# Patient Record
Sex: MALE | Race: BLACK OR AFRICAN AMERICAN | NOT HISPANIC OR LATINO | Employment: UNEMPLOYED | URBAN - METROPOLITAN AREA
[De-identification: names, ages, dates, MRNs, and addresses within clinical notes are randomized per-mention and may not be internally consistent; named-entity substitution may affect disease eponyms.]

---

## 2019-05-30 PROBLEM — S23.9XXA THORACIC BACK SPRAIN: Status: ACTIVE | Noted: 2019-05-30

## 2019-05-30 PROBLEM — S59.901A ELBOW INJURY, RIGHT, INITIAL ENCOUNTER: Status: ACTIVE | Noted: 2019-05-30

## 2019-05-30 PROBLEM — S33.5XXA LUMBAR SPRAIN: Status: ACTIVE | Noted: 2019-05-30

## 2019-05-30 PROBLEM — S13.9XXA CERVICAL SPRAIN, INITIAL ENCOUNTER: Status: ACTIVE | Noted: 2019-05-30

## 2019-10-07 PROBLEM — J01.00 ACUTE NON-RECURRENT MAXILLARY SINUSITIS: Status: ACTIVE | Noted: 2019-10-07

## 2019-10-07 PROBLEM — F41.1 GAD (GENERALIZED ANXIETY DISORDER): Status: ACTIVE | Noted: 2019-10-07

## 2019-10-07 PROBLEM — Z00.00 ANNUAL PHYSICAL EXAM: Status: ACTIVE | Noted: 2019-10-07

## 2019-10-07 PROBLEM — S13.9XXA CERVICAL SPRAIN, INITIAL ENCOUNTER: Status: RESOLVED | Noted: 2019-05-30 | Resolved: 2019-10-07

## 2019-10-07 PROBLEM — S33.5XXA LUMBAR SPRAIN: Status: RESOLVED | Noted: 2019-05-30 | Resolved: 2019-10-07

## 2019-10-07 PROBLEM — S23.9XXA THORACIC BACK SPRAIN: Status: RESOLVED | Noted: 2019-05-30 | Resolved: 2019-10-07

## 2019-10-16 PROBLEM — E78.2 MIXED HYPERLIPIDEMIA: Status: ACTIVE | Noted: 2019-10-16

## 2019-10-16 PROBLEM — Z00.00 ANNUAL PHYSICAL EXAM: Status: RESOLVED | Noted: 2019-10-07 | Resolved: 2019-10-16

## 2019-10-16 PROBLEM — S59.901A ELBOW INJURY, RIGHT, INITIAL ENCOUNTER: Status: RESOLVED | Noted: 2019-05-30 | Resolved: 2019-10-16

## 2019-10-16 PROBLEM — J01.00 ACUTE NON-RECURRENT MAXILLARY SINUSITIS: Status: RESOLVED | Noted: 2019-10-07 | Resolved: 2019-10-16

## 2019-11-04 PROBLEM — G89.21 CHRONIC PAIN DUE TO TRAUMA: Status: ACTIVE | Noted: 2019-11-04

## 2020-04-17 PROBLEM — R41.840 LACK OF CONCENTRATION: Status: ACTIVE | Noted: 2020-04-17

## 2021-04-15 ENCOUNTER — PATIENT MESSAGE (OUTPATIENT)
Dept: RESEARCH | Facility: HOSPITAL | Age: 31
End: 2021-04-15

## 2021-06-25 ENCOUNTER — IMMUNIZATION (OUTPATIENT)
Dept: PRIMARY CARE CLINIC | Facility: CLINIC | Age: 31
End: 2021-06-25

## 2021-06-25 DIAGNOSIS — Z23 NEED FOR VACCINATION: Primary | ICD-10-CM

## 2021-06-25 PROCEDURE — 91303 COVID-19,VECTOR-NR,RS-AD26,PF,0.5 ML DOSE VACCINE (JANSSEN): ICD-10-PCS | Mod: S$GLB,,, | Performed by: INTERNAL MEDICINE

## 2021-06-25 PROCEDURE — 91303 COVID-19,VECTOR-NR,RS-AD26,PF,0.5 ML DOSE VACCINE (JANSSEN): CPT | Mod: S$GLB,,, | Performed by: INTERNAL MEDICINE

## 2021-06-25 PROCEDURE — 0031A COVID-19,VECTOR-NR,RS-AD26,PF,0.5 ML DOSE VACCINE (JANSSEN): ICD-10-PCS | Mod: CV19,S$GLB,, | Performed by: INTERNAL MEDICINE

## 2021-06-25 PROCEDURE — 0031A COVID-19,VECTOR-NR,RS-AD26,PF,0.5 ML DOSE VACCINE (JANSSEN): CPT | Mod: CV19,S$GLB,, | Performed by: INTERNAL MEDICINE

## 2022-12-06 DIAGNOSIS — S89.92XA: Primary | ICD-10-CM

## 2022-12-07 ENCOUNTER — TELEPHONE (OUTPATIENT)
Dept: ORTHOPEDICS | Facility: CLINIC | Age: 32
End: 2022-12-07

## 2022-12-07 NOTE — TELEPHONE ENCOUNTER
Called patient and LVM stating that we would be happy to assist in scheduling an appointment. Left call back number.

## 2022-12-12 ENCOUNTER — PATIENT MESSAGE (OUTPATIENT)
Dept: ORTHOPEDICS | Facility: CLINIC | Age: 32
End: 2022-12-12
Payer: COMMERCIAL

## 2022-12-12 ENCOUNTER — CLINICAL SUPPORT (OUTPATIENT)
Dept: REHABILITATION | Facility: HOSPITAL | Age: 32
End: 2022-12-12
Attending: ORTHOPAEDIC SURGERY
Payer: COMMERCIAL

## 2022-12-12 DIAGNOSIS — M25.662 DECREASED RANGE OF MOTION (ROM) OF LEFT KNEE: ICD-10-CM

## 2022-12-12 DIAGNOSIS — M25.562 LEFT KNEE PAIN, UNSPECIFIED CHRONICITY: Primary | ICD-10-CM

## 2022-12-12 DIAGNOSIS — Z74.09 DECREASED FUNCTIONAL MOBILITY AND ENDURANCE: ICD-10-CM

## 2022-12-12 DIAGNOSIS — R26.9 GAIT DIFFICULTY: ICD-10-CM

## 2022-12-12 PROCEDURE — 97162 PT EVAL MOD COMPLEX 30 MIN: CPT | Mod: PN

## 2022-12-12 PROCEDURE — 97110 THERAPEUTIC EXERCISES: CPT | Mod: PN
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
## 2022-12-15 ENCOUNTER — CLINICAL SUPPORT (OUTPATIENT)
Dept: REHABILITATION | Facility: HOSPITAL | Age: 32
End: 2022-12-15
Payer: COMMERCIAL

## 2022-12-15 DIAGNOSIS — M25.662 DECREASED RANGE OF MOTION (ROM) OF LEFT KNEE: Primary | ICD-10-CM

## 2022-12-15 DIAGNOSIS — R26.9 GAIT DIFFICULTY: ICD-10-CM

## 2022-12-15 DIAGNOSIS — Z74.09 DECREASED FUNCTIONAL MOBILITY AND ENDURANCE: ICD-10-CM

## 2022-12-15 PROCEDURE — 97116 GAIT TRAINING THERAPY: CPT | Mod: PN

## 2022-12-15 PROCEDURE — 97112 NEUROMUSCULAR REEDUCATION: CPT | Mod: PN

## 2022-12-15 PROCEDURE — 97110 THERAPEUTIC EXERCISES: CPT | Mod: PN

## 2022-12-15 NOTE — PROGRESS NOTES
[facility]   Physical Therapy Treatment Note     Name: Mike Aburto  Clinic Number: [id number]    Therapy Diagnosis:   Encounter Diagnoses   Name Primary?    Decreased range of motion (ROM) of left knee Yes    Gait difficulty     Decreased functional mobility and endurance      Physician: Ilda Bonilla MD    Visit Date: 12/15/2022    Physician Orders: PT Eval and Treat   Medical Diagnosis from Referral: S89.92XA (ICD-10-CM) - Injury to multiple structures of left knee  Evaluation Date: 12/12/2022  Authorization Period Expiration: 12/22/22  Plan of Care Expiration: 2/20/23  Visit # / Visits authorized: 1/ 1  FOTO 1/3  Progress Note Due: 1/11/22    PTA Visit #: 0/5     Time In: 3:40  Time Out: 4:30 PM  Total Billable Time: 50 minutes    SUBJECTIVE     Pt reports: he was somewhat sore after his last visit..  He was compliant with home exercise program.  Response to previous treatment: no notable change  Functional change: no notable change    Pain: 6/10  Location: left knee      OBJECTIVE     Objective Measures updated at progress report unless specified.     Treatment     Mike received the treatments listed below:      therapeutic exercises to develop strength, endurance, ROM, flexibility, posture, and core stabilization for 10 minutes including:   - Heel Prop - 5 minutes - 3#   - L gastroc stretch with strap - 2 minutes   - L hamstring stretch seated - 2 minutes      manual therapy techniques:  for 0 minutes, including:   - none performed today    neuromuscular re-education activities to improve: Balance, Coordination, Kinesthetic, Sense, Proprioception, and Posture for 15 minutes. The following activities were included:   - Quad set with towel under knee and heel pop up - 3x10   - SLR with AA from strap to prevent lag - 3x10   - Extensive verbal cueing and tactile cueing for quadriceps activation - breaks required for pain control    therapeutic activities to improve functional  performance for 0  minutes, including:   - none performed today    gait training to improve functional mobility and safety for 15  minutes, including:   - gait training for single crutch WBAT gait      Patient Education and Home Exercises     Home Exercises Provided and Patient Education Provided     Education provided:   - importance of consistency with HEP  - role of physical therapy for this condition      Written Home Exercises Provided: Patient instructed to cont prior HEP. Exercises were reviewed and Mike was able to demonstrate them prior to the end of the session.  Mike demonstrated good  understanding of the education provided. See EMR under Patient Instructions for exercises provided during therapy sessions    ASSESSMENT     Patient with improved quad activation and full knee extension range of motion by conclusion of today's session. He will benefit from continued care.    Mike Is progressing well towards his goals.   Pt prognosis is Good.     Pt will continue to benefit from skilled outpatient physical therapy to address the deficits listed in the problem list box on initial evaluation, provide pt/family education and to maximize pt's level of independence in the home and community environment.     Pt's spiritual, cultural and educational needs considered and pt agreeable to plan of care and goals.     Anticipated barriers to physical therapy: none    Goals: Goals:  Short Term Goals: In 4 weeks:  1.Pt to be educated on HEP to prepare for surgery.  2.Patient to demo increased dorsiflexion AROM to 15 degrees or better.  3.Patient to increase L hamstring strength to 4+/5 or better.  4.Patient to have decreased pain to 5/10 or better.  5.Patient to increase L knee extension to 0 or better.  6.Patient to improve score on the FOTO by 10%.        Long Term Goals: In 10 weeks 2/20/23  1. Patient to perform daily activities including 5 single leg sit to stand from a 24 inch box on the good leg without  limitation.  2. Patient to demonstrate increased L knee flexion AROM to 120 or better.  3. Patient to demonstrate increased L hip ROM to 120 or better .  4. Patient to have decreased pain to 3/10 or better.  5. Patient to improve score on the FOTO to 46% limitation.  6. Pt to demonstrate the ability to ambulate with the least restrictive AD with Wb as Dr permits (NWB on L LE at eval)    PLAN     Continue per plan of care.    Plan of care Certification: 12/12/2022 to 2/20/23.     Outpatient Physical Therapy 3 times weekly for 10 weeks to include the following interventions: Electrical Stimulation IFC, NMES, Gait Training, Manual Therapy, Moist Heat/ Ice, Neuromuscular Re-ed, Patient Education, Self Care, Therapeutic Activities, Therapeutic Exercise, and DN.        Elliott Hartley, PT

## 2022-12-16 ENCOUNTER — CLINICAL SUPPORT (OUTPATIENT)
Dept: REHABILITATION | Facility: HOSPITAL | Age: 32
End: 2022-12-16
Payer: COMMERCIAL

## 2022-12-16 DIAGNOSIS — M25.662 DECREASED RANGE OF MOTION (ROM) OF LEFT KNEE: Primary | ICD-10-CM

## 2022-12-16 DIAGNOSIS — R26.9 GAIT DIFFICULTY: ICD-10-CM

## 2022-12-16 DIAGNOSIS — Z74.09 DECREASED FUNCTIONAL MOBILITY AND ENDURANCE: ICD-10-CM

## 2022-12-16 PROCEDURE — 97112 NEUROMUSCULAR REEDUCATION: CPT | Mod: PN

## 2022-12-16 PROCEDURE — 97110 THERAPEUTIC EXERCISES: CPT | Mod: PN

## 2022-12-16 NOTE — PROGRESS NOTES
[facility]   Physical Therapy Treatment Note     Name: Mike Aburto  Clinic Number: [id number]    Therapy Diagnosis:   Encounter Diagnoses   Name Primary?    Decreased range of motion (ROM) of left knee Yes    Gait difficulty     Decreased functional mobility and endurance        Physician: Ilda Bonilla MD    Visit Date: 12/16/2022    Physician Orders: PT Eval and Treat   Medical Diagnosis from Referral: S89.92XA (ICD-10-CM) - Injury to multiple structures of left knee  Evaluation Date: 12/12/2022  Authorization Period Expiration: 12/22/22  Plan of Care Expiration: 2/20/23  Visit # / Visits authorized: 3/10  FOTO 1/3  Progress Note Due: 1/11/22    PTA Visit #: 0/5     Time In: 7:25 AM (patient arrived late)  Time Out: 8:20 AM  Total Billable Time: 55 minutes    SUBJECTIVE     Pt reports: he had minor soreness after his physical therapy session yesterday but overall is doing well.  He was compliant with home exercise program.  Response to previous treatment: no notable change  Functional change: no notable change    Pain: 6/10  Location: left knee      OBJECTIVE     Objective Measures updated at progress report unless specified.     Treatment     Mike received the treatments listed below:      therapeutic exercises to develop strength, endurance, ROM, flexibility, posture, and core stabilization for 25 minutes including:   - Recumbent Bike - 5 minutes  - Heel Prop - 5 minutes - 3#   - L gastroc stretch with strap - 2 minutes   - L hamstring stretch seated - 2 minutes   - Heel slides with strap - 10x10s      manual therapy techniques:  for 0 minutes, including:   - none performed today    neuromuscular re-education activities to improve: Balance, Coordination, Kinesthetic, Sense, Proprioception, and Posture for 25 minutes. The following activities were included:   - Quad set no towel under knee with [place] - 10 minutes   - Prone Quad Set with [place] - 5 minutes   - SLR with [place] -  5 minutes   - Extensive verbal cueing and tactile cueing for quadriceps activation - breaks required for pain control    therapeutic activities to improve functional performance for 0  minutes, including:   - none performed today    gait training to improve functional mobility and safety for 0  minutes, including:   - none performed today      Patient Education and Home Exercises     Home Exercises Provided and Patient Education Provided     Education provided:   - importance of consistency with HEP  - role of physical therapy for this condition      Written Home Exercises Provided: Patient instructed to cont prior HEP. Exercises were reviewed and Mike was able to demonstrate them prior to the end of the session.  Mike demonstrated good  understanding of the education provided. See EMR under Patient Instructions for exercises provided during therapy sessions    ASSESSMENT     Patient with improved pain-free knee range of motion and good quad activation today. He was able to perform a SLR with no lag. He follows up with Dr. Ribera for further workup on Monday.    Mike Is progressing well towards his goals.   Pt prognosis is Good.     Pt will continue to benefit from skilled outpatient physical therapy to address the deficits listed in the problem list box on initial evaluation, provide pt/family education and to maximize pt's level of independence in the home and community environment.     Pt's spiritual, cultural and educational needs considered and pt agreeable to plan of care and goals.     Anticipated barriers to physical therapy: none    Goals: Goals:  Short Term Goals: In 4 weeks:  1.Pt to be educated on HEP to prepare for surgery.  2.Patient to demo increased dorsiflexion AROM to 15 degrees or better.  3.Patient to increase L hamstring strength to 4+/5 or better.  4.Patient to have decreased pain to 5/10 or better.  5.Patient to increase L knee extension to 0 or better.  6.Patient to improve score on  the FOTO by 10%.        Long Term Goals: In 10 weeks 2/20/23  1. Patient to perform daily activities including 5 single leg sit to stand from a 24 inch box on the good leg without limitation.  2. Patient to demonstrate increased L knee flexion AROM to 120 or better.  3. Patient to demonstrate increased L hip ROM to 120 or better .  4. Patient to have decreased pain to 3/10 or better.  5. Patient to improve score on the FOTO to 46% limitation.  6. Pt to demonstrate the ability to ambulate with the least restrictive AD with Wb as Dr permits    PLAN     Continue per plan of care.    Plan of care Certification: 12/12/2022 to 2/20/23.     Outpatient Physical Therapy 3 times weekly for 10 weeks to include the following interventions: Electrical Stimulation IFC, NMES, Gait Training, Manual Therapy, Moist Heat/ Ice, Neuromuscular Re-ed, Patient Education, Self Care, Therapeutic Activities, Therapeutic Exercise, and DN.        Elliott Hartley, PT

## 2022-12-19 ENCOUNTER — HOSPITAL ENCOUNTER (OUTPATIENT)
Dept: RADIOLOGY | Facility: HOSPITAL | Age: 32
Discharge: HOME | End: 2022-12-19
Attending: ORTHOPAEDIC SURGERY
Payer: COMMERCIAL

## 2022-12-19 ENCOUNTER — OFFICE VISIT (OUTPATIENT)
Dept: ORTHOPEDICS | Facility: CLINIC | Age: 32
End: 2022-12-19
Payer: COMMERCIAL

## 2022-12-19 VITALS — BODY MASS INDEX: 35.26 KG/M2 | WEIGHT: 224.63 LBS | HEIGHT: 67 IN

## 2022-12-19 DIAGNOSIS — S83.242A TEAR OF MEDIAL MENISCUS OF LEFT KNEE, CURRENT, UNSPECIFIED TEAR TYPE, INITIAL ENCOUNTER: ICD-10-CM

## 2022-12-19 DIAGNOSIS — S83.512A CHRONIC RUPTURE OF ACL OF LEFT KNEE: Primary | ICD-10-CM

## 2022-12-19 DIAGNOSIS — M25.562 LEFT KNEE PAIN, UNSPECIFIED CHRONICITY: ICD-10-CM

## 2022-12-19 DIAGNOSIS — S83.282A TEAR OF LATERAL MENISCUS OF LEFT KNEE, CURRENT, UNSPECIFIED TEAR TYPE, INITIAL ENCOUNTER: ICD-10-CM

## 2022-12-19 PROCEDURE — 99999 PR PBB SHADOW E&M-EST. PATIENT-LVL V: ICD-10-PCS | Mod: PBBFAC,,, | Performed by: ORTHOPAEDIC SURGERY

## 2022-12-19 PROCEDURE — 3008F BODY MASS INDEX DOCD: CPT | Mod: CPTII,S$GLB,, | Performed by: ORTHOPAEDIC SURGERY

## 2022-12-19 PROCEDURE — 1159F PR MEDICATION LIST DOCUMENTED IN MEDICAL RECORD: ICD-10-PCS | Mod: CPTII,S$GLB,, | Performed by: ORTHOPAEDIC SURGERY

## 2022-12-19 PROCEDURE — 73564 XR KNEE ORTHO LEFT WITH FLEXION: ICD-10-PCS | Mod: 26,LT,, | Performed by: RADIOLOGY

## 2022-12-19 PROCEDURE — 99204 OFFICE O/P NEW MOD 45 MIN: CPT | Mod: S$GLB,,, | Performed by: ORTHOPAEDIC SURGERY

## 2022-12-19 PROCEDURE — 73562 XR KNEE ORTHO LEFT WITH FLEXION: ICD-10-PCS | Mod: 26,RT,, | Performed by: RADIOLOGY

## 2022-12-19 PROCEDURE — 1159F MED LIST DOCD IN RCRD: CPT | Mod: CPTII,S$GLB,, | Performed by: ORTHOPAEDIC SURGERY

## 2022-12-19 PROCEDURE — 73562 X-RAY EXAM OF KNEE 3: CPT | Mod: 26,RT,, | Performed by: RADIOLOGY

## 2022-12-19 PROCEDURE — 99999 PR PBB SHADOW E&M-EST. PATIENT-LVL V: CPT | Mod: PBBFAC,,, | Performed by: ORTHOPAEDIC SURGERY

## 2022-12-19 PROCEDURE — 97110 THERAPEUTIC EXERCISES: CPT | Mod: S$GLB,,, | Performed by: ORTHOPAEDIC SURGERY

## 2022-12-19 PROCEDURE — 73564 X-RAY EXAM KNEE 4 OR MORE: CPT | Mod: 26,LT,, | Performed by: RADIOLOGY

## 2022-12-19 PROCEDURE — 3008F PR BODY MASS INDEX (BMI) DOCUMENTED: ICD-10-PCS | Mod: CPTII,S$GLB,, | Performed by: ORTHOPAEDIC SURGERY

## 2022-12-19 PROCEDURE — 73564 X-RAY EXAM KNEE 4 OR MORE: CPT | Mod: TC,LT

## 2022-12-19 PROCEDURE — 99204 PR OFFICE/OUTPT VISIT, NEW, LEVL IV, 45-59 MIN: ICD-10-PCS | Mod: S$GLB,,, | Performed by: ORTHOPAEDIC SURGERY

## 2022-12-19 PROCEDURE — 97110 PR THERAPEUTIC EXERCISES: ICD-10-PCS | Mod: S$GLB,,, | Performed by: ORTHOPAEDIC SURGERY

## 2022-12-19 RX ORDER — IBUPROFEN 800 MG/1
800 TABLET ORAL 3 TIMES DAILY
Status: ON HOLD | COMMUNITY
Start: 2022-11-27 | End: 2023-01-24 | Stop reason: HOSPADM

## 2022-12-19 RX ORDER — HYDROCODONE BITARTRATE AND ACETAMINOPHEN 5; 325 MG/1; MG/1
1 TABLET ORAL DAILY PRN
Status: ON HOLD | COMMUNITY
Start: 2022-12-16 | End: 2023-01-24 | Stop reason: HOSPADM

## 2022-12-19 RX ORDER — ERGOCALCIFEROL 1.25 MG/1
CAPSULE ORAL
COMMUNITY
Start: 2022-12-07

## 2022-12-19 RX ORDER — KETOROLAC TROMETHAMINE 10 MG/1
TABLET, FILM COATED ORAL
Status: ON HOLD | COMMUNITY
Start: 2022-11-23 | End: 2023-01-24 | Stop reason: HOSPADM

## 2022-12-19 RX ORDER — PHENTERMINE HYDROCHLORIDE 37.5 MG/1
37.5 TABLET ORAL
COMMUNITY
Start: 2022-12-07

## 2022-12-19 RX ORDER — DICLOFENAC SODIUM 10 MG/G
GEL TOPICAL
Status: ON HOLD | COMMUNITY
Start: 2022-11-23 | End: 2023-01-24 | Stop reason: HOSPADM
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
## 2022-12-19 NOTE — PATIENT INSTRUCTIONS
Assessment:  Mike Aburto is a 32 y.o. male    with a chief complaint of Injury, Pain, and Swelling of the Left Knee    DOI 11/20/22 ACL Tear, posterior root of the Medial and Lateral Menisci     Encounter Diagnoses   Name Primary?    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 Yes    Tear of medial meniscus of left knee, current, unspecified tear type, initial encounter     Tear of lateral meniscus of left knee, current, unspecified tear type, initial encounter       Plan:  Left ACL Reconstruction with quad tendon autograft, Medial and Lateral meniscus repair, possible use of allograft, any indicated procedures.  Stop taking the hydrocodone and you can continue the Toradol.  Needs PCP clearance  At least 15 minutes were spent developing, teaching, and performing a home exercise program.  A written summary was provided and all questions were answered. This service was performed by Dalia Hartley Sports Medicine Assistant under direction of Danial Ribera MD. CPT 31062-YU      Exercises listed are to be performed by the patient following surgery. Perform sets of 10 repetitions, 4 times per day.       Heel Slides        Lie flat or sit with your leg straight. Slide your heel toward your hip. Try to get your knee bent to a 90° angle. Slide your heel back so your leg is straight then relax.       Knee Extension (Lying Down)      While lying down, rest your ankle on a towel roll so that your knee and calf are not touching the floor. Allow gravity to straighten your knee. Maintain this position for up to 10 minutes.  Knee Extension (Sitting in a Chair)      While sitting in a chair, prop your heel on another chair so that there is nothing behind your calf or knee. Allow gravity to straighten your knee. Maintain this position for up to 10 minutes.      Patellar Mobilization      This exercise is done by simply pushing the patella up and down and side to side and holding that position. Movement of the patella is  essential when restoring range of motion. If the patella cannot move within the femoral groove, then the knee cannot bend and extend.       Quadriceps Isometrics (Quad Sets)        Lie flat or sit with your surgical leg straight. Tighten the muscle in the front of your thigh as much as you can, pushing the back or your knee flat against the floor. Hold this tight for 5 seconds then relax.    Straight Leg Raises (SLR)      Lie flat or sit with your leg straight and your knee brace on (if you have one). You may have your non-operative knee bent slightly for comfort. Perform a Quad set (as above) and flex your toes straight up. Lift your heel off of the floor and hold for at least 5 seconds. Keep your thigh muscle as tight as you can and lower your heel back down then relax.      Seated Knee Flexion        Sit with your legs dangling over the bed. Relax your leg allowing gravity to bend your knee. You may use your non-operative leg to gently push your operative leg into more of a bend. Maintain this position for up to 10 minutes.      Calf Pumps         Point and flex your toes to tighten your calf muscles.        Follow-up: Surgery or sooner if there are any problems between now and then.    Leave Review:   Google: Leave Google Review  Healthgrades: Leave Healthgrades Review    After Hours Number: [phone]
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
## 2022-12-20 ENCOUNTER — TELEPHONE (OUTPATIENT)
Dept: ORTHOPEDICS | Facility: CLINIC | Age: 32
End: 2022-12-20
Payer: COMMERCIAL

## 2022-12-20 NOTE — TELEPHONE ENCOUNTER
[facility] pt of 1/24/23 Sx date and that pre-admit will reach out w/ arrival time and details the afternoon of the 23rd. Pt stated this will be WC. I gave him Mariam' contact and the clinic's fax #.

## 2022-12-30 ENCOUNTER — TELEPHONE (OUTPATIENT)
Dept: ORTHOPEDICS | Facility: CLINIC | Age: 32
End: 2022-12-30
Payer: COMMERCIAL

## 2022-12-30 PROBLEM — F17.200 TOBACCO DEPENDENCE: Status: ACTIVE | Noted: 2022-12-30

## 2022-12-30 NOTE — TELEPHONE ENCOUNTER
The lady on the other line stated that this was an after hours IM emergency line. She stated that she has no knowledge of the request. Home health has not been requested from our office.     ----- Message from Fatmata Mosley sent at 12/30/2022 11:40 AM CST -----  Contact: Denny([facility])/ [phone]  Denny is calling in regards to pt PREOP paperwork. Please give her a call back at [phone]. Thank you s/g

## 2023-01-19 ENCOUNTER — PATIENT MESSAGE (OUTPATIENT)
Dept: ORTHOPEDICS | Facility: CLINIC | Age: 33
End: 2023-01-19
Payer: COMMERCIAL

## 2023-01-19 DIAGNOSIS — S83.242A TEAR OF MEDIAL MENISCUS OF LEFT KNEE, CURRENT, UNSPECIFIED TEAR TYPE, INITIAL ENCOUNTER: ICD-10-CM

## 2023-01-19 DIAGNOSIS — S83.512A CHRONIC RUPTURE OF ACL OF LEFT KNEE: Primary | ICD-10-CM

## 2023-01-19 DIAGNOSIS — S83.282A TEAR OF LATERAL MENISCUS OF LEFT KNEE, CURRENT, UNSPECIFIED TEAR TYPE, INITIAL ENCOUNTER: ICD-10-CM

## 2023-01-21 ENCOUNTER — ANESTHESIA EVENT (OUTPATIENT)
Dept: SURGERY | Facility: HOSPITAL | Age: 33
End: 2023-01-21
Payer: COMMERCIAL

## 2023-01-21 NOTE — ANESTHESIA PREPROCEDURE EVALUATION
01/21/2023  Mike Aburto is a 32 y.o., male.  Past Medical History:   Diagnosis Date    Allergy     Seasonal allergies        Past Surgical History:   Procedure Laterality Date    FRACTURE SURGERY      left lower arm    HAND SURGERY Right 2011    TONSILLECTOMY         Pre-op Assessment    I have reviewed the Patient Summary Reports.     I have reviewed the Nursing Notes. I have reviewed the NPO Status.   I have reviewed the Medications.     Review of Systems  Anesthesia Hx:  No problems with previous Anesthesia  History of prior surgery of interest to airway management or planning: Previous anesthesia: General Airway issues documented on chart review include laryngeal mask airway used  Denies Family Hx of Anesthesia complications.   Denies Personal Hx of Anesthesia complications.   Social:  Smoker, Social Alcohol Use 1 ppd, vaping now.   Hematology/Oncology:  Hematology Normal        Cardiovascular:   hyperlipidemia    Pulmonary:  Pulmonary Normal    Renal/:  Renal/ Normal     Hepatic/GI:  Hepatic/GI Normal    Neurological:  Neurology Normal    Endocrine:  Obesity / BMI > 30  Psych:   anxiety          Physical Exam  General: Alert and Oriented    Airway:  Mallampati: I   Mouth Opening: Normal  TM Distance: Normal  Tongue: Normal  Neck ROM: Normal ROM    Dental:  Intact    Chest/Lungs:  Clear to auscultation, Normal Respiratory Rate    Heart:  Rate: Normal  Rhythm: Regular Rhythm        Anesthesia Plan  Type of Anesthesia, risks & benefits discussed:    Anesthesia Type: Gen Supraglottic Airway  Intra-op Monitoring Plan: Standard ASA Monitors  Post Op Pain Control Plan: multimodal analgesia, IV/PO Opioids PRN and peripheral nerve block  Induction:  IV  Informed Consent: Informed consent signed with the Patient and all parties understand the risks and agree with anesthesia plan.  All questions  answered.   ASA Score: 2  Day of Surgery Review of History & Physical: H&P Update referred to the surgeon/provider.    Ready For Surgery From Anesthesia Perspective.     .

## 2023-01-23 ENCOUNTER — TELEPHONE (OUTPATIENT)
Dept: PREADMISSION TESTING | Facility: HOSPITAL | Age: 33
End: 2023-01-23
Payer: COMMERCIAL

## 2023-01-23 NOTE — TELEPHONE ENCOUNTER
Called and spoke with the patient about the following:     Your Surgery arrival time is at 6:30AM on 1/24/23 at [facility] location.   The address is [address] [place]  [address].      Only one adult (over 18) is to accompany you to surgery, unless it is a Pediatric patient, then 2 adults are encouraged to accompany them to the surgery center.     Your ride MUST STAY the entire time until you are discharged.      Please come to the main lobby and be prepared to show your photo ID and insurance card.      Nothing to eat or drink after midnight, unless you were instructed to take specific medications discussed with the Pre-admit Nurse.      Please call [phone] or [phone] with any questions or concerns.      Thanks.

## 2023-01-24 ENCOUNTER — ANESTHESIA (OUTPATIENT)
Dept: SURGERY | Facility: HOSPITAL | Age: 33
End: 2023-01-24
Payer: COMMERCIAL

## 2023-01-24 ENCOUNTER — HOSPITAL ENCOUNTER (OUTPATIENT)
Dept: RADIOLOGY | Facility: HOSPITAL | Age: 33
Discharge: HOME | End: 2023-01-24
Attending: ORTHOPAEDIC SURGERY | Admitting: ORTHOPAEDIC SURGERY
Payer: COMMERCIAL

## 2023-01-24 ENCOUNTER — NURSE TRIAGE (OUTPATIENT)
Dept: ADMINISTRATIVE | Facility: CLINIC | Age: 33
End: 2023-01-24
Payer: COMMERCIAL

## 2023-01-24 ENCOUNTER — TELEPHONE (OUTPATIENT)
Dept: ORTHOPEDICS | Facility: HOSPITAL | Age: 33
End: 2023-01-24
Payer: COMMERCIAL

## 2023-01-24 ENCOUNTER — HOSPITAL ENCOUNTER (OUTPATIENT)
Facility: HOSPITAL | Age: 33
Discharge: HOME | End: 2023-01-24
Attending: ORTHOPAEDIC SURGERY | Admitting: ORTHOPAEDIC SURGERY
Payer: COMMERCIAL

## 2023-01-24 VITALS
BODY MASS INDEX: 37.37 KG/M2 | HEIGHT: 66 IN | HEART RATE: 71 BPM | DIASTOLIC BLOOD PRESSURE: 73 MMHG | OXYGEN SATURATION: 100 % | RESPIRATION RATE: 15 BRPM | SYSTOLIC BLOOD PRESSURE: 124 MMHG | WEIGHT: 232.5 LBS | TEMPERATURE: 98 F

## 2023-01-24 DIAGNOSIS — S83.512D RUPTURE OF ANTERIOR CRUCIATE LIGAMENT OF LEFT KNEE, SUBSEQUENT ENCOUNTER: Primary | ICD-10-CM

## 2023-01-24 DIAGNOSIS — S83.512A LEFT ACL TEAR: ICD-10-CM

## 2023-01-24 PROCEDURE — 63600175 PHARM REV CODE 636 W HCPCS: Performed by: ANESTHESIOLOGY

## 2023-01-24 PROCEDURE — C1713 ANCHOR/SCREW BN/BN,TIS/BN: HCPCS | Performed by: ORTHOPAEDIC SURGERY

## 2023-01-24 PROCEDURE — 71000015 HC POSTOP RECOV 1ST HR: Performed by: ORTHOPAEDIC SURGERY

## 2023-01-24 PROCEDURE — 37000009 HC ANESTHESIA EA ADD 15 MINS: Performed by: ORTHOPAEDIC SURGERY

## 2023-01-24 PROCEDURE — D9220A PRA ANESTHESIA: ICD-10-PCS | Mod: CRNA,,, | Performed by: NURSE ANESTHETIST, CERTIFIED REGISTERED

## 2023-01-24 PROCEDURE — 27201423 OPTIME MED/SURG SUP & DEVICES STERILE SUPPLY: Performed by: ORTHOPAEDIC SURGERY

## 2023-01-24 PROCEDURE — 29883 ARTHRS KNE SRG MNISC RPR M&L: CPT | Mod: 51,LT,, | Performed by: ORTHOPAEDIC SURGERY

## 2023-01-24 PROCEDURE — 25000003 PHARM REV CODE 250: Performed by: NURSE ANESTHETIST, CERTIFIED REGISTERED

## 2023-01-24 PROCEDURE — 37000008 HC ANESTHESIA 1ST 15 MINUTES: Performed by: ORTHOPAEDIC SURGERY

## 2023-01-24 PROCEDURE — 36000710: Performed by: ORTHOPAEDIC SURGERY

## 2023-01-24 PROCEDURE — 25000003 PHARM REV CODE 250: Performed by: ANESTHESIOLOGY

## 2023-01-24 PROCEDURE — 63600175 PHARM REV CODE 636 W HCPCS: Performed by: NURSE ANESTHETIST, CERTIFIED REGISTERED

## 2023-01-24 PROCEDURE — 64447 NJX AA&/STRD FEMORAL NRV IMG: CPT | Mod: 59,LT,, | Performed by: ANESTHESIOLOGY

## 2023-01-24 PROCEDURE — D9220A PRA ANESTHESIA: Mod: ANES,,, | Performed by: ANESTHESIOLOGY

## 2023-01-24 PROCEDURE — 73560 X-RAY EXAM OF KNEE 1 OR 2: CPT | Mod: TC,LT

## 2023-01-24 PROCEDURE — 29888 ARTHRS AID ACL RPR/AGMNTJ: CPT | Mod: LT,,, | Performed by: ORTHOPAEDIC SURGERY

## 2023-01-24 PROCEDURE — 71000033 HC RECOVERY, INTIAL HOUR: Performed by: ORTHOPAEDIC SURGERY

## 2023-01-24 PROCEDURE — 64447 PR NERVE BLOCK INJ, ANES/STEROID, FEMORAL, INCL IMAG GUIDANCE: ICD-10-PCS | Mod: 59,LT,, | Performed by: ANESTHESIOLOGY

## 2023-01-24 PROCEDURE — 29883 PR KNEE SCOPE,MED+LAT MENIS REPAIR: ICD-10-PCS | Mod: 51,LT,, | Performed by: ORTHOPAEDIC SURGERY

## 2023-01-24 PROCEDURE — 64450 NJX AA&/STRD OTHER PN/BRANCH: CPT | Performed by: ORTHOPAEDIC SURGERY

## 2023-01-24 PROCEDURE — 36000711: Performed by: ORTHOPAEDIC SURGERY

## 2023-01-24 PROCEDURE — C1889 IMPLANT/INSERT DEVICE, NOC: HCPCS | Performed by: ORTHOPAEDIC SURGERY

## 2023-01-24 PROCEDURE — 76942 ECHO GUIDE FOR BIOPSY: CPT | Performed by: ANESTHESIOLOGY

## 2023-01-24 PROCEDURE — D9220A PRA ANESTHESIA: ICD-10-PCS | Mod: ANES,,, | Performed by: ANESTHESIOLOGY

## 2023-01-24 PROCEDURE — 29888 PR KNEE SCOPE,AID ANT CRUCIATE REPAIR: ICD-10-PCS | Mod: LT,,, | Performed by: ORTHOPAEDIC SURGERY

## 2023-01-24 PROCEDURE — D9220A PRA ANESTHESIA: Mod: CRNA,,, | Performed by: NURSE ANESTHETIST, CERTIFIED REGISTERED

## 2023-01-24 PROCEDURE — 63600175 PHARM REV CODE 636 W HCPCS: Performed by: ORTHOPAEDIC SURGERY

## 2023-01-24 DEVICE — BUTTON TIGHTROPE ABS RND 20MM: Type: IMPLANTABLE DEVICE | Site: KNEE | Status: FUNCTIONAL

## 2023-01-24 DEVICE — SYS NDL DELIVERY FAST FIX 360: Type: IMPLANTABLE DEVICE | Site: KNEE | Status: FUNCTIONAL

## 2023-01-24 DEVICE — SYS SWIVELOCK ANCH 4.75X19.1MM: Type: IMPLANTABLE DEVICE | Site: KNEE | Status: FUNCTIONAL

## 2023-01-24 DEVICE — ANCHOR SUT FIBERSTITCH 2-0 CRV: Type: IMPLANTABLE DEVICE | Site: KNEE | Status: FUNCTIONAL

## 2023-01-24 DEVICE — BUTTON CLAVICLE: Type: IMPLANTABLE DEVICE | Site: KNEE | Status: FUNCTIONAL

## 2023-01-24 RX ORDER — NEOSTIGMINE METHYLSULFATE 1 MG/ML
INJECTION, SOLUTION INTRAVENOUS
Status: DISCONTINUED | OUTPATIENT
Start: 2023-01-24 | End: 2023-01-24

## 2023-01-24 RX ORDER — SUCCINYLCHOLINE CHLORIDE 20 MG/ML
INJECTION INTRAMUSCULAR; INTRAVENOUS
Status: DISCONTINUED | OUTPATIENT
Start: 2023-01-24 | End: 2023-01-24

## 2023-01-24 RX ORDER — CHLORHEXIDINE GLUCONATE ORAL RINSE 1.2 MG/ML
10 SOLUTION DENTAL
Status: DISCONTINUED | OUTPATIENT
Start: 2023-01-24 | End: 2023-01-24 | Stop reason: HOSPADM

## 2023-01-24 RX ORDER — ASPIRIN 325 MG
325 TABLET, DELAYED RELEASE (ENTERIC COATED) ORAL DAILY
Qty: 42 TABLET | Refills: 0 | Status: SHIPPED | OUTPATIENT
Start: 2023-01-24 | End: 2023-03-07

## 2023-01-24 RX ORDER — ONDANSETRON 4 MG/1
4 TABLET, FILM COATED ORAL EVERY 8 HOURS PRN
Qty: 30 TABLET | Refills: 0 | Status: SHIPPED | OUTPATIENT
Start: 2023-01-24

## 2023-01-24 RX ORDER — DIPHENHYDRAMINE HYDROCHLORIDE 50 MG/ML
25 INJECTION INTRAMUSCULAR; INTRAVENOUS EVERY 6 HOURS PRN
Status: DISCONTINUED | OUTPATIENT
Start: 2023-01-24 | End: 2023-01-24 | Stop reason: HOSPADM

## 2023-01-24 RX ORDER — FENTANYL CITRATE 50 UG/ML
INJECTION, SOLUTION INTRAMUSCULAR; INTRAVENOUS
Status: DISCONTINUED | OUTPATIENT
Start: 2023-01-24 | End: 2023-01-24

## 2023-01-24 RX ORDER — BUPIVACAINE HYDROCHLORIDE 2.5 MG/ML
INJECTION, SOLUTION EPIDURAL; INFILTRATION; INTRACAUDAL
Status: DISCONTINUED
Start: 2023-01-24 | End: 2023-01-24 | Stop reason: HOSPADM

## 2023-01-24 RX ORDER — SODIUM CHLORIDE, SODIUM LACTATE, POTASSIUM CHLORIDE, CALCIUM CHLORIDE 600; 310; 30; 20 MG/100ML; MG/100ML; MG/100ML; MG/100ML
INJECTION, SOLUTION INTRAVENOUS CONTINUOUS
Status: DISCONTINUED | OUTPATIENT
Start: 2023-01-24 | End: 2023-01-24 | Stop reason: HOSPADM

## 2023-01-24 RX ORDER — ONDANSETRON 2 MG/ML
INJECTION INTRAMUSCULAR; INTRAVENOUS
Status: DISCONTINUED | OUTPATIENT
Start: 2023-01-24 | End: 2023-01-24

## 2023-01-24 RX ORDER — PROPOFOL 10 MG/ML
VIAL (ML) INTRAVENOUS
Status: DISCONTINUED | OUTPATIENT
Start: 2023-01-24 | End: 2023-01-24

## 2023-01-24 RX ORDER — OXYCODONE AND ACETAMINOPHEN 5; 325 MG/1; MG/1
1 TABLET ORAL
Qty: 45 TABLET | Refills: 0 | Status: SHIPPED | OUTPATIENT
Start: 2023-01-24 | End: 2023-01-30

## 2023-01-24 RX ORDER — SODIUM CHLORIDE 9 MG/ML
INJECTION, SOLUTION INTRAVENOUS CONTINUOUS
Status: DISCONTINUED | OUTPATIENT
Start: 2023-01-24 | End: 2023-01-24 | Stop reason: HOSPADM

## 2023-01-24 RX ORDER — DOCUSATE SODIUM 100 MG/1
100 CAPSULE, LIQUID FILLED ORAL 2 TIMES DAILY
Qty: 30 CAPSULE | Refills: 0 | Status: SHIPPED | OUTPATIENT
Start: 2023-01-24

## 2023-01-24 RX ORDER — HYDROCODONE BITARTRATE AND ACETAMINOPHEN 5; 325 MG/1; MG/1
1 TABLET ORAL
Status: DISCONTINUED | OUTPATIENT
Start: 2023-01-24 | End: 2023-01-24 | Stop reason: HOSPADM

## 2023-01-24 RX ORDER — DEXMEDETOMIDINE HYDROCHLORIDE 100 UG/ML
INJECTION, SOLUTION INTRAVENOUS
Status: DISCONTINUED | OUTPATIENT
Start: 2023-01-24 | End: 2023-01-24

## 2023-01-24 RX ORDER — LIDOCAINE HYDROCHLORIDE 10 MG/ML
INJECTION, SOLUTION EPIDURAL; INFILTRATION; INTRACAUDAL; PERINEURAL
Status: DISCONTINUED
Start: 2023-01-24 | End: 2023-01-24 | Stop reason: HOSPADM

## 2023-01-24 RX ORDER — ROPIVACAINE HYDROCHLORIDE 5 MG/ML
INJECTION, SOLUTION EPIDURAL; INFILTRATION; PERINEURAL
Status: COMPLETED | OUTPATIENT
Start: 2023-01-24 | End: 2023-01-24

## 2023-01-24 RX ORDER — ROCURONIUM BROMIDE 10 MG/ML
INJECTION, SOLUTION INTRAVENOUS
Status: DISCONTINUED | OUTPATIENT
Start: 2023-01-24 | End: 2023-01-24

## 2023-01-24 RX ORDER — ONDANSETRON 2 MG/ML
4 INJECTION INTRAMUSCULAR; INTRAVENOUS ONCE AS NEEDED
Status: DISCONTINUED | OUTPATIENT
Start: 2023-01-24 | End: 2023-01-24 | Stop reason: HOSPADM

## 2023-01-24 RX ORDER — EPINEPHRINE 1 MG/ML
INJECTION, SOLUTION, CONCENTRATE INTRAVENOUS
Status: DISCONTINUED
Start: 2023-01-24 | End: 2023-01-24 | Stop reason: HOSPADM

## 2023-01-24 RX ORDER — MEPERIDINE HYDROCHLORIDE 25 MG/ML
12.5 INJECTION INTRAMUSCULAR; INTRAVENOUS; SUBCUTANEOUS ONCE
Status: DISCONTINUED | OUTPATIENT
Start: 2023-01-24 | End: 2023-01-24 | Stop reason: HOSPADM

## 2023-01-24 RX ORDER — LIDOCAINE HYDROCHLORIDE 20 MG/ML
INJECTION, SOLUTION EPIDURAL; INFILTRATION; INTRACAUDAL; PERINEURAL
Status: DISCONTINUED | OUTPATIENT
Start: 2023-01-24 | End: 2023-01-24

## 2023-01-24 RX ORDER — HYDROCODONE BITARTRATE AND ACETAMINOPHEN 5; 325 MG/1; MG/1
1 TABLET ORAL EVERY 4 HOURS PRN
Status: DISCONTINUED | OUTPATIENT
Start: 2023-01-24 | End: 2023-01-24 | Stop reason: HOSPADM

## 2023-01-24 RX ORDER — CHLORHEXIDINE GLUCONATE ORAL RINSE 1.2 MG/ML
10 SOLUTION DENTAL 2 TIMES DAILY
Status: DISCONTINUED | OUTPATIENT
Start: 2023-01-24 | End: 2023-01-24 | Stop reason: HOSPADM

## 2023-01-24 RX ORDER — CEFAZOLIN SODIUM 2 G/50ML
2 SOLUTION INTRAVENOUS
Status: DISCONTINUED | OUTPATIENT
Start: 2023-01-24 | End: 2023-01-24 | Stop reason: HOSPADM

## 2023-01-24 RX ORDER — OXYCODONE HYDROCHLORIDE 5 MG/1
5 TABLET ORAL EVERY 4 HOURS PRN
Qty: 10 TABLET | Refills: 0 | Status: SHIPPED | OUTPATIENT
Start: 2023-01-24 | End: 2023-01-30

## 2023-01-24 RX ORDER — MIDAZOLAM HYDROCHLORIDE 1 MG/ML
INJECTION, SOLUTION INTRAMUSCULAR; INTRAVENOUS
Status: DISCONTINUED | OUTPATIENT
Start: 2023-01-24 | End: 2023-01-24

## 2023-01-24 RX ORDER — SODIUM CHLORIDE 0.9 % (FLUSH) 0.9 %
10 SYRINGE (ML) INJECTION
Status: DISCONTINUED | OUTPATIENT
Start: 2023-01-24 | End: 2023-01-24 | Stop reason: HOSPADM

## 2023-01-24 RX ORDER — CEPHALEXIN 500 MG/1
500 CAPSULE ORAL EVERY 12 HOURS
Qty: 10 CAPSULE | Refills: 0 | Status: SHIPPED | OUTPATIENT
Start: 2023-01-24 | End: 2023-01-29

## 2023-01-24 RX ORDER — ACETAMINOPHEN 10 MG/ML
INJECTION, SOLUTION INTRAVENOUS
Status: DISCONTINUED | OUTPATIENT
Start: 2023-01-24 | End: 2023-01-24

## 2023-01-24 RX ORDER — DEXAMETHASONE SODIUM PHOSPHATE 4 MG/ML
INJECTION, SOLUTION INTRA-ARTICULAR; INTRALESIONAL; INTRAMUSCULAR; INTRAVENOUS; SOFT TISSUE
Status: DISCONTINUED | OUTPATIENT
Start: 2023-01-24 | End: 2023-01-24

## 2023-01-24 RX ORDER — EPINEPHRINE 1 MG/ML
INJECTION, SOLUTION, CONCENTRATE INTRAVENOUS
Status: DISCONTINUED | OUTPATIENT
Start: 2023-01-24 | End: 2023-01-24 | Stop reason: HOSPADM

## 2023-01-24 RX ORDER — FENTANYL CITRATE 50 UG/ML
25 INJECTION, SOLUTION INTRAMUSCULAR; INTRAVENOUS EVERY 5 MIN PRN
Status: COMPLETED | OUTPATIENT
Start: 2023-01-24 | End: 2023-01-24

## 2023-01-24 RX ADMIN — SODIUM CHLORIDE, SODIUM LACTATE, POTASSIUM CHLORIDE, AND CALCIUM CHLORIDE: 600; 310; 30; 20 INJECTION, SOLUTION INTRAVENOUS at 07:01

## 2023-01-24 RX ADMIN — FENTANYL CITRATE 25 MCG: 50 INJECTION, SOLUTION INTRAMUSCULAR; INTRAVENOUS at 01:01

## 2023-01-24 RX ADMIN — ROCURONIUM BROMIDE 10 MG: 10 INJECTION, SOLUTION INTRAVENOUS at 09:01

## 2023-01-24 RX ADMIN — PROPOFOL 200 MG: 10 INJECTION, EMULSION INTRAVENOUS at 09:01

## 2023-01-24 RX ADMIN — SODIUM CHLORIDE, SODIUM LACTATE, POTASSIUM CHLORIDE, AND CALCIUM CHLORIDE: 600; 310; 30; 20 INJECTION, SOLUTION INTRAVENOUS at 11:01

## 2023-01-24 RX ADMIN — ROPIVACAINE HYDROCHLORIDE 20 ML: 5 INJECTION, SOLUTION EPIDURAL; INFILTRATION; PERINEURAL at 07:01

## 2023-01-24 RX ADMIN — DEXTROSE 2 G: 50 INJECTION, SOLUTION INTRAVENOUS at 09:01

## 2023-01-24 RX ADMIN — ACETAMINOPHEN 1000 MG: 10 INJECTION, SOLUTION INTRAVENOUS at 10:01

## 2023-01-24 RX ADMIN — ONDANSETRON 4 MG: 2 INJECTION, SOLUTION INTRAMUSCULAR; INTRAVENOUS at 11:01

## 2023-01-24 RX ADMIN — FENTANYL CITRATE 100 MCG: 50 INJECTION, SOLUTION INTRAMUSCULAR; INTRAVENOUS at 09:01

## 2023-01-24 RX ADMIN — DEXAMETHASONE SODIUM PHOSPHATE 8 MG: 4 INJECTION, SOLUTION INTRA-ARTICULAR; INTRALESIONAL; INTRAMUSCULAR; INTRAVENOUS; SOFT TISSUE at 09:01

## 2023-01-24 RX ADMIN — LIDOCAINE HYDROCHLORIDE 100 MG: 20 INJECTION, SOLUTION EPIDURAL; INFILTRATION; INTRACAUDAL; PERINEURAL at 09:01

## 2023-01-24 RX ADMIN — DEXMEDETOMIDINE HYDROCHLORIDE 4 MCG: 100 INJECTION, SOLUTION INTRAVENOUS at 12:01

## 2023-01-24 RX ADMIN — HYDROCODONE BITARTRATE AND ACETAMINOPHEN 1 TABLET: 5; 325 TABLET ORAL at 01:01

## 2023-01-24 RX ADMIN — FENTANYL CITRATE 100 MCG: 50 INJECTION, SOLUTION INTRAMUSCULAR; INTRAVENOUS at 07:01

## 2023-01-24 RX ADMIN — DEXMEDETOMIDINE HYDROCHLORIDE 8 MCG: 100 INJECTION, SOLUTION INTRAVENOUS at 11:01

## 2023-01-24 RX ADMIN — ROCURONIUM BROMIDE 40 MG: 10 INJECTION, SOLUTION INTRAVENOUS at 09:01

## 2023-01-24 RX ADMIN — SUCCINYLCHOLINE CHLORIDE 160 MG: 20 INJECTION, SOLUTION INTRAMUSCULAR; INTRAVENOUS at 09:01

## 2023-01-24 RX ADMIN — GLYCOPYRROLATE 0.6 MG: 0.2 INJECTION, SOLUTION INTRAMUSCULAR; INTRAVITREAL at 12:01

## 2023-01-24 RX ADMIN — NEOSTIGMINE METHYLSULFATE 4 MG: 1 INJECTION INTRAVENOUS at 12:01

## 2023-01-24 RX ADMIN — MIDAZOLAM 2 MG: 1 INJECTION INTRAMUSCULAR; INTRAVENOUS at 07:01

## 2023-01-24 RX ADMIN — FENTANYL CITRATE 50 MCG: 50 INJECTION, SOLUTION INTRAMUSCULAR; INTRAVENOUS at 11:01

## 2023-01-24 RX ADMIN — FENTANYL CITRATE 50 MCG: 50 INJECTION, SOLUTION INTRAMUSCULAR; INTRAVENOUS at 10:01

## 2023-01-24 NOTE — PATIENT INSTRUCTIONS
Knee Surgery Post-Operative Instructions     Danial Ribera MD   [address] [place] [address]  Ph: [phone] Fax: [phone]    After you get home, apply ice to your knee but keep the bandages dry. You may apply ice?for 15-20 minutes every 1-2 hours for first week. Ice helps to reduce pain and?swelling. Never apply ice directly to the skin. If you are using a CryoCuff/PolarIce, it should be ice cold for no more than 15-20 minutes every 1-2 hours.     Elevate your leg on 2-3 pillows or rolled up towels placed under the heel so that the heel?is elevated higher than your knee. This will help reduce swelling and achieve full?extension of the knee.     It is important to get up and move around after your surgery. It's good for your lungs after anesthesia, and also good for your circulation to help prevent blood clots from developing.  However, too much walking will cause the knee to swell and hurt.     After 72 hours, you can remove the ACE wrap and bandages. You should then place new gauze/bandages and ACE wrap each day for 2 weeks.     You may shower, but the incisions, ACE bandages, and Brace must not get wet until 72 hours after surgery and only if there is no drainage at all from the incisions. Do not soak the knee under water for 2 weeks.     Weight-Bearing Status: You are to be (non-weight bearing x 6 weeks) on your operative leg.? Range of motion:0-90 x 4 weeks    Take the pain medicine as needed. You may take up to 2 tablets every 4-6 hours if?needed. As the pain subsides try to increase the time between doses.      Your first post-operative check-up with Dr. Ribera 10-14 days from the?day of surgery.        It is normal to have some discomfort and swelling, as well as a small amount of blood-tinged drainage, following surgery. If this becomes severe, or if you develop a fever greater than or equal to?101 degrees, calf pain, or shortness of breath or chest pain, please call  immediately. If?you have questions or problems, call the office at [phone].     NORMAL SENSATIONS AND FINDINGS AFTER SURGERY   Shin pain   Knee swelling and warmth up to 2 weeks   Small amounts of bloody drainage   Numbness around the incision area   Soreness and swelling in the back of the knee   Bruising to the lower leg   Lower leg swelling, including the ankle - if this occurs elevate the leg above the heart?and apply ice to the swollen areas.   Numbness to the foot if you had a nerve block - will resolve within a few days   Low grade temperature less than 101.5 - if this occurs drink plenty of fluids and cough?and deep breathe (take 10 breaths, on the last hold for a second then forcefully cough a?few times). A low grade temp is normal for a week after surgery   Small amount of redness to the area where the sutures insert in the skin  Low back discomfort due to the epidural / spinal anesthesia apply a heating pad as?needed      NOTIFY OUR OFFICE IMMEDIATELY AT [phone] IF ANY OF THE FOLLOWING SIGNS OR SYMPTOMS OCCUR:   Chest pain or shortness of breath   Change is noted to your incision (i.e. increased redness or drainage)   Numbness of your foot if you didn't have a nerve block   Sharp pains in the back of your hip, thigh, or calf   Temperature greater than 101.5 degrees   Fever, chills, nausea, vomiting or diarrhea   Stitches loosen or fall out and incisions open up   Thick, foul-smelling drainage (yellow or greenish)   Increased pain which is not relieved by medications or other measures mentioned above       Knee & Quad Exercise Instructions     Danial Ribera MD   [address] [facility] [place] [address]  Ph: [phone] Fax: [phone]       Exercises listed are to be performed by the patient following surgery. Perform sets of 10 repetitions, 4 times per day.        Heel Slides        Lie flat or sit with your leg straight. Slide your heel toward your hip. Try to get your knee  bent to a 90° angle. Slide your heel back so your leg is straight then relax.       Knee Extension (Lying Down)      While lying down, rest your ankle on a towel roll so that your knee and calf are not touching the floor. Allow gravity to straighten your knee. Maintain this position for up to 10 minutes.       Knee Extension (Sitting in a Chair)      While sitting in a chair, prop your heel on another chair so that there is nothing behind your calf or knee. Allow gravity to straighten your knee. Maintain this position for up to 10 minute       Patellar Mobilization      This exercise is done by simply pushing the patella up and down and side to side and holding that position. Movement of the patella is essential when restoring range of motion. If the patella cannot move within the femoral groove, then the knee cannot bend and extend.?         Quadriceps Isometrics (Quad Sets)        Lie flat or sit with your surgical leg straight. Tighten the muscle in the front of your thigh as much as you can, pushing the back or your knee flat against the floor. Hold this tight for 5 seconds then relax.        Straight Leg Raises (SLR)      Lie flat or sit with your leg straight and your knee brace on (if you have one). You may have your non-operative knee bent slightly for comfort. Perform a Quad set (as above) and flex your toes straight up. Lift your heel off of the floor and hold for at least 5 seconds. Keep your thigh muscle as tight as you can and lower your heel back down then relax.       Seated Knee Flexion        Sit with your legs dangling over the bed. Relax your leg allowing gravity to bend your knee. You may use your non-operative leg to gently push your operative leg into more of a bend. Maintain this position for up to 10 minutes.        Calf Pumps         Point and flex your toes to tighten your calf muscles.

## 2023-01-24 NOTE — ANESTHESIA PROCEDURE NOTES
Intubation    Date/Time: 1/24/2023 9:37 AM  Performed by: Heidy Amin CRNA  Authorized by: Geeta Long MD     Intubation:     Induction:  Rapid sequence induction    Intubated:  Postinduction    Mask Ventilation:  Not attempted    Attempts:  1    Attempted By:  CRNA    Method of Intubation:  Video laryngoscopy    Blade:  Briggs 3    Laryngeal View Grade: Grade I - full view of cords      Difficult Airway Encountered?: No      Complications:  None    Airway Device:  Oral endotracheal tube    Airway Device Size:  7.5    Style/Cuff Inflation:  Cuffed (inflated to minimal occlusive pressure)    Inflation Amount (mL):  9    Tube secured:  22    Secured at:  The lips    Placement Verified By:  Capnometry    Complicating Factors:  Obesity    Findings Post-Intubation:  BS equal bilateral and atraumatic/condition of teeth unchanged  Notes:      Pt has full beard. Mask vent not attempted.
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
## 2023-01-24 NOTE — DISCHARGE SUMMARY
"[facility]  Discharge Note  Short Stay    Procedure(s) (LRB):  RECONSTRUCTION, KNEE, ACL, USING GRAFT (Left)  ARTHROSCOPY,KNEE,WITH MENISCUS REPAIR (Left)      OUTCOME: Patient tolerated treatment/procedure well without complication and is now ready for discharge.    DISPOSITION: Home or Self Care    FINAL DIAGNOSIS:  Left knee ACL reconstruction    FOLLOWUP: In clinic    DISCHARGE INSTRUCTIONS:    Discharge Procedure Orders   CRUTCHES FOR HOME USE     Order Specific Question Answer Comments   Type: Axillary    Height: 5' 6" (1.676 m)    Weight: 105.4 kg (232 lb 7.6 oz)    Length of need (1-99 months): 6 weeks     Diet general     Activity as tolerated     Sponge bath only until clinic visit     Ice to affected area     Weight bearing restrictions (specify)     Call MD for:  temperature >100.4     Call MD for:  persistent nausea and vomiting     Call MD for:  severe uncontrolled pain     Call MD for:  difficulty breathing, headache or visual disturbances     Call MD for:  redness, tenderness, or signs of infection (pain, swelling, redness, odor or green/yellow discharge around incision site)     Call MD for:  hives     Call MD for:  persistent dizziness or light-headedness     Call MD for:  extreme fatigue     No driving, operating heavy equipment or signing legal documents while taking pain medication     Change dressing (specify)   Order Comments: Dressing change: At first physical therapy appointment     Non weight bearing   Order Comments: X 6 weeks        TIME SPENT ON DISCHARGE: 30 minutes  "

## 2023-01-24 NOTE — ANESTHESIA PROCEDURE NOTES
Peripheral Block    Patient location during procedure: pre-op   Block not for primary anesthetic.  Reason for block: at surgeon's request and post-op pain management   Post-op Pain Location: Left knee   Start time: 1/24/2023 7:52 AM  Timeout: 1/24/2023 7:49 AM   End time: 1/24/2023 7:54 AM    Staffing  Authorizing Provider: Geeta Long MD  Performing Provider: Geeta Long MD    Preanesthetic Checklist  Completed: patient identified, IV checked, site marked, risks and benefits discussed, surgical consent, monitors and equipment checked, pre-op evaluation and timeout performed  Peripheral Block  Patient position: supine  Prep: ChloraPrep  Patient monitoring: heart rate, cardiac monitor, continuous pulse ox, continuous capnometry and frequent blood pressure checks  Block type: adductor canal  Laterality: left  Injection technique: single shot  Needle  Needle type: Stimuplex   Needle gauge: 22 G  Needle length: 4 in  Needle localization: anatomical landmarks and ultrasound guidance   -ultrasound image captured on disc.  Assessment  Injection assessment: negative aspiration, negative parasthesia and local visualized surrounding nerve  Paresthesia pain: none  Heart rate change: no  Slow fractionated injection: yes  Pain Tolerance: comfortable throughout block and no complaints  Medications:    Medications: ropivacaine (NAROPIN) injection 0.5% - Perineural   20 mL - 1/24/2023 7:54:00 AM    Additional Notes  VSS.  [facility] RN monitoring vitals throughout procedure.  Patient tolerated procedure well.

## 2023-01-24 NOTE — PLAN OF CARE
Left Adductor Canal block completed per anesthesia at bedside at this time. Patient tolerated well. VSS. Continuous cardiac and SPO2 monitoring in place.

## 2023-01-24 NOTE — TRANSFER OF CARE
"Anesthesia Transfer of Care Note    Patient: Mike Aburto    Procedure(s) Performed: Procedure(s) (LRB):  RECONSTRUCTION, KNEE, ACL, USING GRAFT (Left)  ARTHROSCOPY,KNEE,WITH MENISCUS REPAIR (Left)    Patient location: PACU    Anesthesia Type: general and regional    Transport from OR: Transported from OR on room air with adequate spontaneous ventilation    Post pain: adequate analgesia    Post assessment: no apparent anesthetic complications    Post vital signs: stable    Level of consciousness: sedated    Nausea/Vomiting: no nausea/vomiting    Complications: none    Transfer of care protocol was followed      Last vitals:   Visit Vitals  BP (!) (P) 107/57 (BP Location: Right arm, Patient Position: Lying)   Pulse (P) 69   Temp 36.2 °C (97.1 °F) (Temporal)   Resp (!) (P) 8   Ht 5' 6" (1.676 m)   Wt 105.4 kg (232 lb 7.6 oz)   SpO2 (P) 97%   BMI 37.52 kg/m²     "

## 2023-01-25 ENCOUNTER — OFFICE VISIT (OUTPATIENT)
Dept: ORTHOPEDICS | Facility: CLINIC | Age: 33
End: 2023-01-25
Payer: COMMERCIAL

## 2023-01-25 ENCOUNTER — TELEPHONE (OUTPATIENT)
Dept: ORTHOPEDICS | Facility: CLINIC | Age: 33
End: 2023-01-25
Payer: COMMERCIAL

## 2023-01-25 ENCOUNTER — CLINICAL SUPPORT (OUTPATIENT)
Dept: REHABILITATION | Facility: HOSPITAL | Age: 33
End: 2023-01-25
Attending: ORTHOPAEDIC SURGERY
Payer: COMMERCIAL

## 2023-01-25 VITALS — HEIGHT: 66 IN | WEIGHT: 232 LBS | BODY MASS INDEX: 37.28 KG/M2

## 2023-01-25 DIAGNOSIS — G89.18 POSTOPERATIVE PAIN: ICD-10-CM

## 2023-01-25 DIAGNOSIS — M25.662 DECREASED RANGE OF MOTION (ROM) OF LEFT KNEE: Primary | ICD-10-CM

## 2023-01-25 DIAGNOSIS — R29.898 DECREASED STRENGTH OF LOWER EXTREMITY: ICD-10-CM

## 2023-01-25 DIAGNOSIS — Z74.09 DECREASED FUNCTIONAL MOBILITY AND ENDURANCE: ICD-10-CM

## 2023-01-25 DIAGNOSIS — Z98.890 POST-OPERATIVE STATE: Primary | ICD-10-CM

## 2023-01-25 DIAGNOSIS — R26.9 GAIT DIFFICULTY: ICD-10-CM

## 2023-01-25 DIAGNOSIS — Z98.890 S/P ACL RECONSTRUCTION: ICD-10-CM

## 2023-01-25 PROCEDURE — 97110 THERAPEUTIC EXERCISES: CPT | Performed by: PHYSICAL THERAPIST

## 2023-01-25 PROCEDURE — 99024 PR POST-OP FOLLOW-UP VISIT: ICD-10-PCS | Mod: S$GLB,,, | Performed by: PHYSICIAN ASSISTANT

## 2023-01-25 PROCEDURE — 99024 POSTOP FOLLOW-UP VISIT: CPT | Mod: S$GLB,,, | Performed by: PHYSICIAN ASSISTANT

## 2023-01-25 PROCEDURE — 3008F PR BODY MASS INDEX (BMI) DOCUMENTED: ICD-10-PCS | Mod: CPTII,S$GLB,, | Performed by: PHYSICIAN ASSISTANT

## 2023-01-25 PROCEDURE — 97014 ELECTRIC STIMULATION THERAPY: CPT | Performed by: PHYSICAL THERAPIST

## 2023-01-25 PROCEDURE — 97162 PT EVAL MOD COMPLEX 30 MIN: CPT | Performed by: PHYSICAL THERAPIST

## 2023-01-25 PROCEDURE — 1159F PR MEDICATION LIST DOCUMENTED IN MEDICAL RECORD: ICD-10-PCS | Mod: CPTII,S$GLB,, | Performed by: PHYSICIAN ASSISTANT

## 2023-01-25 PROCEDURE — 1159F MED LIST DOCD IN RCRD: CPT | Mod: CPTII,S$GLB,, | Performed by: PHYSICIAN ASSISTANT

## 2023-01-25 PROCEDURE — 1160F PR REVIEW ALL MEDS BY PRESCRIBER/CLIN PHARMACIST DOCUMENTED: ICD-10-PCS | Mod: CPTII,S$GLB,, | Performed by: PHYSICIAN ASSISTANT

## 2023-01-25 PROCEDURE — 3008F BODY MASS INDEX DOCD: CPT | Mod: CPTII,S$GLB,, | Performed by: PHYSICIAN ASSISTANT

## 2023-01-25 PROCEDURE — 1160F RVW MEDS BY RX/DR IN RCRD: CPT | Mod: CPTII,S$GLB,, | Performed by: PHYSICIAN ASSISTANT

## 2023-01-25 PROCEDURE — 99999 PR PBB SHADOW E&M-EST. PATIENT-LVL III: CPT | Mod: PBBFAC,,, | Performed by: PHYSICIAN ASSISTANT

## 2023-01-25 PROCEDURE — 99999 PR PBB SHADOW E&M-EST. PATIENT-LVL III: ICD-10-PCS | Mod: PBBFAC,,, | Performed by: PHYSICIAN ASSISTANT

## 2023-01-25 NOTE — TELEPHONE ENCOUNTER
"Pt had left knee surgery today. He states that his prescribed pain medication "is not helping the pain at all." Pt's chika comes on the line and states, "He is in such severe pain. I think his nerve block wore off and his ice machine seems to be making him feel worse."     Care Advice recommends calling OCP. OCP, Dr. Kingsley called for further guidance. Dr. Kingsley advises that from now until 6 am, pt is to take 2 tablets of one or the other of his prescription painkillers every three hours. (10 mg of Percocet OR Oxycodone q3) He also advises that pt stop using the ice machine.     Pt and pt's romane verbalize understanding and are instructed to call back with any new/worsening sxs, questions, or concerns.   Reason for Disposition   SEVERE post-op pain (e.g., excruciating, pain scale 8-10) that is not controlled with pain medications    Additional Information   Negative: Sounds like a life-threatening emergency to the triager   Negative: Bright red, wide-spread, sunburn-like rash   Negative: SEVERE headache and after spinal (epidural) anesthesia   Negative: Vomiting and persists > 4 hours   Negative: Vomiting and abdomen looks much more swollen than usual   Negative: Drinking very little and dehydration suspected (e.g., no urine > 12 hours, very dry mouth, very lightheaded)   Negative: Patient sounds very sick or weak to the triager   Negative: Sounds like a serious complication to the triager   Negative: Fever > 100.4 F (38.0 C)   Negative: Caller has URGENT question and triager unable to answer question    Protocols used: Post-Op Symptoms and [id number]    "
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
## 2023-01-25 NOTE — TELEPHONE ENCOUNTER
I spoke with the on-call nurse.  The patient called her with complaints of pain not controlled with oxycodone 5 mg every 4 hours.  Also ice machine is more uncomfortable than helpful.      Recommend that he increase the oxycodone to 10 mg every 3 hours for the next 3 or 4 doses.  After that resume as prescribed.  Use ice machine as needed.  Contact Dr. Ribera's office tomorrow if pain is not adequately controlled.
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
## 2023-01-25 NOTE — TELEPHONE ENCOUNTER
Spoke with patient. Coming in due to uncontrolled pain      ----- Message from Gabrielle Rodriguez MA sent at 1/25/2023  7:33 AM CST -----    ----- Message -----  From: Shashank Reardon  Sent: 1/25/2023   6:45 AM CST  To: Mounika Barrow Staff    Type:  Needs Medical Advice    Who Called:  Mike  Symptoms (please be specific):  knee pain   How long has patient had these symptoms:   1 day  Pharmacy name and phone #:  [facility] [phone]   Would the patient rather a call back or a response via [facility]?  Call back   Best Call Back Number:  [phone]  Additional Information:  patient had knee surgery on yesterday and states that the medication (oxyCODONE (ROXICODONE) 5 MG immediate release tablet) that he was given is not helping with the pain

## 2023-01-25 NOTE — PROGRESS NOTES
[facility]   Physical Therapy Treatment Note     Name: Mike Aburto  Clinic Number: [id number]    Therapy Diagnosis:   Encounter Diagnoses   Name Primary?    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     Tear of medial meniscus of left knee, current, unspecified tear type, initial encounter     Tear of lateral meniscus of left knee, current, unspecified tear type, initial encounter     Decreased strength of lower extremity Yes    Decreased range of motion (ROM) of left knee     Decreased functional mobility and endurance      Physician: Danial Ribera MD    Visit Date: 1/27/2023    Physician Orders: PT Eval and Treat   Medical Diagnosis from Referral: Injury to multiple structures of left knee [S89.92XA]  Evaluation Date: 1/25/2023  Authorization Period Expiration: 1/26/23  Plan of Care Expiration: 4/25/23  Progress Note Due: 2/25/23  Visit # / Visits authorized: 1/ 20   FOTO: Not performed    PTA Visit #: 0/5     Postoperative Plan:  ACL with meniscus repair protocol  Weight bearing:  Nonweightbearing for 6 weeks  Range of motion:  0-90 for 4  Antibiotics: 5 days of PO prophylactic antibiotics  DVT Ppx:  Aspirin daily  PT/OT: Start POD#3  Follow-up: 10-14 days with AP/Lateral of operative knee (non-weightbearing)    Time In: 8:45 AM  Time Out: 10:30 AM  Total Billable Time: 105 minutes    SUBJECTIVE     Pt reports: he has been having a good bit of pain since his surgery. He reports has been compliant with his post-operative restrictions. He reports the pain has kept him from doing much at home.  He was compliant with home exercise program.  Response to previous treatment: no notable change  Functional change: no notable change    Pain: 9/10  Location: left knee     OBJECTIVE     Objective Measures updated at progress report unless specified.     1/27:   Edema:  3 cm superior to patella: 51 cm  Mid patella: 48 cm  3 cm inferior to patella: 43.5 cm    Extension: 0 degrees  Flexion: 85  degrees    Treatment     Mike received the treatments listed below:      therapeutic exercises to develop strength, endurance, ROM, flexibility, posture, and core stabilization for 27 minutes including:  - Heel Prop - 0# - 5 minutes              - L gastroc stretch with strap - 2 minutes              - L hamstring stretch seated - 2 minutes   - seated edge of bed slow lowering to improve knee flexion range of motion with protocol- 8 minutes   - education on abiding by precautions during exercises - 10 minutes     manual therapy techniques:  for 25 minutes, including:              - knee PROM to improve flexion and extension range of motion   - patella mobilizations     neuromuscular re-education activities to improve: Balance, Coordination, Kinesthetic, Sense, Proprioception, and Posture for 28 minutes. The following activities were included:    - Quad set without Bol[place]ian - 5 minutes  - Quad set with Bolivian - 10 minutes   - SAQ with Bolivian and AA - 5 minutes   - LAQ with Bol[place]ian and AA - 5 minutes        therapeutic activities to improve functional performance for 0  minutes, including:              - none performed today     gait training to improve functional mobility and safety for 0  minutes, including:              - none performed today     supervised modalities after being cleared for contradictions: IFC Electrical Stimulation: 10 minutes Mike received IFC Electrical Stimulation for pain control applied to the knee. Mike tolderated treatment well without any adverse effects.        Patient Education and Home Exercises     Home Exercises Provided and Patient Education Provided     Education provided:   - importance of consistency with HEP  - role of physical therapy for this condition  - reeducation on post-op precautions    Written Home Exercises Provided: Patient instructed to cont prior HEP. Exercises were reviewed and Mike was able to demonstrate them prior to the end of the session.  Mike  demonstrated good  understanding of the education provided. See EMR under Patient Instructions for exercises provided during therapy sessions    ASSESSMENT     Patient tolerated today's treatment well. Extension range of motion and quad control are improved compared to his last visit with Sav. He will need continued physical therapy care.    Mike Is progressing well towards his goals.   Pt prognosis is Good.     Pt will continue to benefit from skilled outpatient physical therapy to address the deficits listed in the problem list box on initial evaluation, provide pt/family education and to maximize pt's level of independence in the home and community environment.     Pt's spiritual, cultural and educational needs considered and pt agreeable to plan of care and goals.     Anticipated barriers to physical therapy: none    Goals:  Short-Term Goals: 6 weeks  - The patient will be independent with initial home exercise program. (Progressing)  - The patient will increase strength to at least 3+/5 to perform functional mobility including gait and sit to stand (Progressing)  - The patient will increase knee ROM grossly to 0-90 degrees to perform ADLs with pain < 5/10. (Progressing)     Long-Term Goals: 12 weeks  - Pt to achieve <__% limitation as measured by the FOTO to demonstrate decreased disability.   - The patient will be independent amb with no assistive device on all surfaces for community distances. (Progressing)   - The patient will increase strength to at least 4+/5 to perform functional mobility including gait, stairs.(Progressing)  - The patient will increase knee ROM grossly to WFL to perform all ADLs with pain < 3/10.(Progressing)    PLAN     Plan of care Certification: 1/25/2023 to 4/25/23.     Outpatient Physical Therapy 2 times weekly for 12 weeks to include the following interventions: Manual Therapy, Moist Heat/ Ice, Neuromuscular Re-ed, Patient Education, Therapeutic Activities, Therapeutic Exercise,  Gait Training, Electrical Stimulation PRN, and Dry needling PRN.     This patient had a ligamentous repair in his L knee. I expect he will require 6-9 months of physical therapy to safely return to his prior level of function to allow for restored strength and adequate tissue healing.    Elliott Hartley, PT

## 2023-01-25 NOTE — PROGRESS NOTES
Patient ID: Mike Aburto  YOB: 1990  MRN: [id number]    Chief Complaint: Post-op Evaluation of the Left Knee      Referred By: Dr. Bonilla    History of Present Illness: Mike Aburto is a 32 y.o. male    with a chief complaint of Post-op Evaluation of the Left Knee      Mike Aburto presents today 1 day status post ACL recon on 1/24/2023. He presents NWB with with a brace and crutches. He is currently in a wheelchair. He has been in  pain since surgery. He is currently taking Percocet every 3 hours as advised by the emergency hotline. He supplements his pain medication with oxycodone 5mg every 2 hours after taking Percocet. He states that he has gotten no relief from his pain medication. He also uses the Polarice machine, but he states that it has not helped. The patient has not started physical therapy yet. The patient's wife states that his toes on the operative leg are blue intermittently.     Past Medical History:   Past Medical History:   Diagnosis Date    Allergy     Seasonal allergies      Past Surgical History:   Procedure Laterality Date    FRACTURE SURGERY      left lower arm    HAND SURGERY Right 2011    TONSILLECTOMY       Family History   Problem Relation Age of Onset    No Known Problems Mother     No Known Problems Father     Cancer Maternal Uncle     Cancer Maternal Grandfather     Diabetes Paternal Grandmother      Social History     Socioeconomic History    Marital status: Single   Tobacco Use    Smoking status: Every Day     Packs/day: 0.50     Types: Cigarettes    Smokeless tobacco: Never   Substance and Sexual Activity    Alcohol use: Yes     Comment: occassionally    Drug use: Never    Sexual activity: Yes     Partners: Female     Medication List with Changes/Refills   Current Medications    ASPIRIN (ECOTRIN) 325 MG EC TABLET    Take 1 tablet (325 mg total) by mouth once daily. Complete full 6 weeks    CEPHALEXIN (KEFLEX) 500 MG CAPSULE    Take 1  capsule (500 mg total) by mouth every 12 (twelve) hours. for 5 days    DEXTROAMPHETAMINE-AMPHETAMINE 10 MG TAB    Take 1 tablet (10 mg total) by mouth once daily.    DOCUSATE SODIUM (COLACE) 100 MG CAPSULE    Take 1 capsule (100 mg total) by mouth 2 (two) times daily.    ERGOCALCIFEROL (ERGOCALCIFEROL) 50,000 UNIT CAP    Take by mouth.    ONDANSETRON (ZOFRAN) 4 MG TABLET    Take 1 tablet (4 mg total) by mouth every 8 (eight) hours as needed for Nausea.    OXYCODONE (ROXICODONE) 5 MG IMMEDIATE RELEASE TABLET    Take 1 tablet (5 mg total) by mouth every 4 (four) hours as needed for Pain (For break through pain).    OXYCODONE-ACETAMINOPHEN (PERCOCET) 5-325 MG PER TABLET    Take 1 to 2 tablets by mouth every 4 to 6 hours as needed for pain    PHENTERMINE (ADIPEX-P) 37.5 MG TABLET    Take 37.5 mg by mouth.     Review of patient's allergies indicates:  No Known Allergies  ROS    Physical Exam:   Body mass index is 37.45 kg/m².  There were no vitals filed for this visit.   GENERAL: Well appearing, appropriate for stated age, no acute distress.  CARDIOVASCULAR: Pulses regular by peripheral palpation.  PULMONARY: Respirations are even and non-labored.  NEURO: Awake, alert, and oriented x 3.  PSYCH: Mood & affect are appropriate.  HEENT: Head is normocephalic and atraumatic.    Ortho/SPM Exam  Left Knee Exam  Sutures removed, incision sites clean dry and intact, no signs of infection  Minimal effusion minimum  Knee ROM full extension to 20.   All compartments are soft and compressible. Calf soft non-tender. Intact EHL, FHL, gastrocsoleus, and tibialis anterior. Sensation intact to light touch in superficial peroneal, deep peroneal, tibial, sural, and saphenous nerve distributions. Foot warm and well perfused with capillary refill of less than 2 seconds and palpable pedal pulses.       Imaging:   XR Results:  Results for orders placed during the hospital encounter of 01/24/23    X-Ray Knee 1 or 2 View Left    Narrative  EXAM:  XR KNEE 1 OR 2 VIEW LEFT    CLINICAL INDICATION:  intra op imaging    Air Kerma: 0.17 mGy    FINDINGS and    Impression  Fluoroscopic guidance for left knee procedure. Please see procedural dictation for further details.    Finalized on: 1/25/2023 8:37 AM By:  Jere Gamble MD  [facility]# [id number]      2023-01-25 08:39:[id number]    [facility]      Relevant imaging results reviewed and interpreted by me, discussed with the patient and / or family today.      Patient Instructions   Assessment:  Mike Aburto is a  32 y.o. male    with a chief complaint of Post-op Evaluation of the Left Knee  1 day s/p ACL reconstruction, medial and lateral meniscus repair on 1/24/23  Post-op pain    Encounter Diagnoses   Name Primary?    Post-operative state Yes    Postoperative pain     S/P ACL reconstruction         Plan:  Discussed pain medicine uses and directions.  Will work with Physical therapy today for pain relief, follow-up as scheduled with Elliott on Friday.  Continue physical therapy using ACL reconstruction and meniscus repair protocol  Nonweightbearing x6 weeks  Range of motion 0-90 x 4 weeks  Continue aspirin daily x6 weeks to prevent blood clots    Follow-up:  2 weeks as scheduled or sooner if there are any problems between now and then.    Leave Review:   Google: Leave Google Review  Healthgrades: Leave Healthgrades Review    After Hours Number: [phone]      Provider Note/Medical Decision Making:       I discussed worrisome and red flag signs and symptoms with the patient. The patient expressed understanding and agreed to alert me immediately or to go to the emergency room if they experience any of these.   Treatment plan was developed with input from the patient/family, and they expressed understanding and agreement with the plan. All questions were answered today.                 Danial Ribera MD  Orthopaedic Surgery & Sports Medicine     Disclaimer: This note was prepared using a voice recognition system  and is likely to have sound alike errors within the text.

## 2023-01-26 ENCOUNTER — TELEPHONE (OUTPATIENT)
Dept: ORTHOPEDICS | Facility: CLINIC | Age: 33
End: 2023-01-26
Payer: COMMERCIAL

## 2023-01-26 NOTE — PATIENT INSTRUCTIONS
Assessment:  Mike Aburto is a  32 y.o. male    with a chief complaint of Post-op Evaluation of the Left Knee  1 day s/p ACL reconstruction, medial and lateral meniscus repair on 1/24/23  Post-op pain    Encounter Diagnoses   Name Primary?    Post-operative state Yes    Postoperative pain     S/P ACL reconstruction         Plan:  Discussed pain medicine uses and directions.  Will work with Physical therapy today for pain relief, follow-up as scheduled with Elliott on Friday.  Continue physical therapy using ACL reconstruction and meniscus repair protocol  Nonweightbearing x6 weeks  Range of motion 0-90 x 4 weeks  Continue aspirin daily x6 weeks to prevent blood clots    Follow-up:  2 weeks as scheduled or sooner if there are any problems between now and then.    Leave Review:   Google: Leave Google Review  Healthgrades: Leave Healthgrades Review    After Hours Number: [phone]

## 2023-01-26 NOTE — TELEPHONE ENCOUNTER
Called patient to check on him following his surgery with Dr. Ribera on 1/24 and follow-up appointment with RONALDO Sandoval on 1/25. Patient states that he has been taking his post-operative pain medications with no relief to pain. Patient stated that he has made an appointment with his PCP tomorrow at 1:45pm and hopes to be referred to pain management. Patient made no further requests, nor had any questions/concerns at this time.

## 2023-01-27 ENCOUNTER — CLINICAL SUPPORT (OUTPATIENT)
Dept: REHABILITATION | Facility: HOSPITAL | Age: 33
End: 2023-01-27
Attending: ORTHOPAEDIC SURGERY
Payer: COMMERCIAL

## 2023-01-27 DIAGNOSIS — S83.512A CHRONIC RUPTURE OF ACL OF LEFT KNEE: ICD-10-CM

## 2023-01-27 DIAGNOSIS — S83.242A TEAR OF MEDIAL MENISCUS OF LEFT KNEE, CURRENT, UNSPECIFIED TEAR TYPE, INITIAL ENCOUNTER: ICD-10-CM

## 2023-01-27 DIAGNOSIS — M25.662 DECREASED RANGE OF MOTION (ROM) OF LEFT KNEE: ICD-10-CM

## 2023-01-27 DIAGNOSIS — R29.898 DECREASED STRENGTH OF LOWER EXTREMITY: Primary | ICD-10-CM

## 2023-01-27 DIAGNOSIS — Z74.09 DECREASED FUNCTIONAL MOBILITY AND ENDURANCE: ICD-10-CM

## 2023-01-27 DIAGNOSIS — S83.282A TEAR OF LATERAL MENISCUS OF LEFT KNEE, CURRENT, UNSPECIFIED TEAR TYPE, INITIAL ENCOUNTER: ICD-10-CM

## 2023-01-27 PROCEDURE — 97140 MANUAL THERAPY 1/> REGIONS: CPT | Mod: PN

## 2023-01-27 PROCEDURE — 97112 NEUROMUSCULAR REEDUCATION: CPT | Mod: PN

## 2023-01-27 PROCEDURE — 97110 THERAPEUTIC EXERCISES: CPT | Mod: PN

## 2023-01-27 PROCEDURE — 97014 ELECTRIC STIMULATION THERAPY: CPT | Mod: PN

## 2023-01-27 NOTE — PLAN OF CARE
[facility]  Physical Therapy Initial Evaluation    Name: Mike Aburto  Clinic Number: [id number]    Therapy Diagnosis:   Encounter Diagnoses   Name Primary?    Decreased range of motion (ROM) of left knee Yes    Gait difficulty     Decreased functional mobility and endurance     Decreased strength of lower extremity      Physician: Danial Ribera MD    Physician Orders: PT Eval and Treat   Medical Diagnosis from Referral: Injury to multiple structures of left knee [S89.92XA]  Evaluation Date: 1/25/2023  Authorization Period Expiration: 1/26/23  Plan of Care Expiration: 4/25/23  Progress Note Due: 2/25/23  Visit # / Visits authorized: 1/ 20   FOTO: Not performed    Time In: 10:20  Time Out: 11:00  Total Billable Time: 40 minutes    Precautions: Standard  Postoperative Plan:  ACL with meniscus repair protocol  Weight bearing:  Nonweightbearing for 6 weeks  Range of motion:  0-90 for 4  Antibiotics: 5 days of PO prophylactic antibiotics  DVT Ppx:  Aspirin daily  PT/OT: Start POD#3  Follow-up: 10-14 days with AP/Lateral of operative knee (non-weightbearing)       Subjective   Date of onset: Surgery 1/24/23  History of current condition - Mike reports: He had ACL and meniscus surgery yesterday. He has been having a lot of pain.      Imaging, None    Prior Therapy: Pre-op  Social History:  lives with fiance  Occupation:   Prior Level of Function: Full function with ADLs  Current Level of Function: Unable to perform ADLs, work, self care due to pain, postop precautions    Pain:  Current 10/10,  Location: left medial knee  Description: Aching  Aggravating Factors: Movement, ice  Easing Factors: Rest, pain medication    Pts goals: Get back to full function and work    Medical History:   Past Medical History:   Diagnosis Date    Allergy     Seasonal allergies        Surgical History:   Mike Aburto  has a past surgical history that includes Hand surgery (Right, 2011);  Tonsillectomy; and Fracture surgery.    Medications:   Mike has a current medication list which includes the following prescription(s): aspirin, cephalexin, dextroamphetamine-amphetamine, docusate sodium, ergocalciferol, gabapentin, ondansetron, oxycodone, oxycodone-acetaminophen, and phentermine.    Allergies:   Review of patient's allergies indicates:  No Known Allergies     Objective     Observation - Unable to to visualize wounds today due to only being 1 day post op     Range of Motion:     Knee AROM/PROM Right Left Pain/Dysfunction with Movement Goal   Knee Flexion (135º) 135 40 pain 135   Knee Extension (0º) +5 Lacking 1 pain    +5         Strength:     L/E MMT Right  (spine) Left Pain/Dysfunction with Movement Goal   Hip Flexion        4+/5 B   Hip Extension        4+/5 B   Hip Abduction        4+/5 B   Knee Extension   Trace   5/5 B   Knee Flexion   Trace   5/5 B   Hip IR       4+/5 B   Hip ER       4+/5 B   Ankle DF       5/5 B   Ankle PF       5/5 B   Ankle Inversion       5/5 B   Ankle Eversion       5/5 B      Joint Mobility:  Unable to assess since dressings were still in place        Sensation: Unable to assess since dressings were still in place     Palpation: Unable to assess since dressings were still in place      CMS Impairment/Limitation/Restriction for FOTO knee Survey    Therapist reviewed FOTO scores for Mike Aburto on 1/25/2023.   FOTO documents entered into [employer] - see Media section.    Limitation Score: NT%       TREATMENT   Treatment Time In: 10:30  Treatment Time Out: 11:00  Total Treatment time separate from Evaluation: 30 minutes    therapeutic exercises to develop strength, endurance, ROM, flexibility, posture, and core stabilization for 15 minutes including:  - Heel Prop - 1 minute x 3, HEP isntruction              - L gastroc stretch with strap - HEP instruction              - L hamstring stretch seated - HEP instruction              - Heel slides with strap - 10x10s               - Ankle pumps- 30x              - Quad set without NMES - HEP instruction              - passive range of motion flexion/extension x 5 minutes     manual therapy techniques:  for 0 minutes, including:              - none performed today     neuromuscular re-education activities to improve: Balance, Coordination, Kinesthetic, Sense, Proprioception, and Posture for 00 minutes. The following activities were included:        therapeutic activities to improve functional performance for 0  minutes, including:              - none performed today     gait training to improve functional mobility and safety for 0  minutes, including:              - none performed today     supervised modalities after being cleared for contradictions: IFC Electrical Stimulation:  Mike received IFC Electrical Stimulation for pain control applied to the knee. Pt received stimulation at 0 % scan for 15 minutes. Mike tolderated treatment well without any adverse effects.      Home Exercises and Patient Education Provided    Education provided:   - Role of PT, PT POC    Written Home Exercises Provided: yes.  Exercises were reviewed and Mike was able to demonstrate them prior to the end of the session.  Mike demonstrated good  understanding of the education provided.     See EMR under Patient Instructions for exercises provided 1/25/2023.    Assessment   Mike is a 32 y.o. male referred to outpatient Physical Therapy with a medical diagnosis of s/p L ACL and meniscus repair. Patient noted to have significant pain, range of motion impairments, strength impairments, and functional impairments upon clinical examination.This pt is a good candidate for skilled PT tx and stands to benefit from a combination of manual therapy, therapeutic exercise/activities, neuromuscular re-education for kinesthetic awareness, and modalities prn. The pt has been educated on their dx/POC and consents to further PT tx.      Pt prognosis is Good.   Pt will benefit  from skilled outpatient Physical Therapy to address the deficits stated above and in the chart below, provide pt/family education, and to maximize pt's level of independence.     Plan of care discussed with patient: Yes  Pt's spiritual, cultural and educational needs considered and patient is agreeable to the plan of care and goals as stated below:     Anticipated Barriers for therapy: None    Medical Necessity is demonstrated by the following  History  Co-morbidities and personal factors that may impact the plan of care Co-morbidities:   See medical history  Not sleeping well     Personal Factors:   coping style  lifestyle  attitudes       moderate   Examination  Body Structures and Functions, activity limitations and participation restrictions that may impact the plan of care Body Regions:   lower extremities  trunk     Body Systems:    ROM  strength  balance  gait  transfers  transitions  motor control     Participation Restrictions:   See current restrictions     Activity limitations:   Learning and applying knowledge  watching  listening     General Tasks and Commands  undertaking multiple tasks     Communication  no deficits     Mobility  lifting and carrying objects  walking  using transportation (bus, train, plane, car)  driving (bike, car, motorcycle)     Self care  washing oneself (bathing, drying, washing hands)  caring for body parts (brushing teeth, shaving, grooming)  toileting  dressing  eating  drinking  looking after one's health     Domestic Life  shopping  cooking  doing house work (cleaning house, washing dishes, laundry)  assisting others     Interactions/Relationships  basic interpersonal interactions  complex interpersonal interactions  family relationships     Life Areas  employment  basic economic transactions     Community and Social Life  community life  recreation and leisure             high   Clinical Presentation unstable clinical presentation with unpredictable characteristics high    Decision Making/ Complexity Score: moderate      Goals:  Short-Term Goals: 6 weeks  - The patient will be independent with initial home exercise program.  - The patient will increase strength to at least 3+/5 to perform functional mobility including gait and sit to stand  - The patient will increase knee ROM grossly to 0-90 degrees to perform ADLs with pain < 5/10.    Long-Term Goals: 12 weeks  - Pt to achieve <__% limitation as measured by the FOTO to demonstrate decreased disability.  - The patient will be independent amb with no assistive device on all surfaces for community distances.  - The patient will increase strength to at least 4+/5 to perform functional mobility including gait, stairs.  - The patient will increase knee ROM grossly to WFL to perform all ADLs with pain < 3/10.      Plan   Plan of care Certification: 1/25/2023 to 4/25/23.    Outpatient Physical Therapy 2 times weekly for 12 weeks to include the following interventions: Manual Therapy, Moist Heat/ Ice, Neuromuscular Re-ed, Patient Education, Therapeutic Activities, Therapeutic Exercise, Gait Training, Electrical Stimulation PRN, and Dry needling PRN.   .     Sav Nicholson, PT, DPT  Physical Therapist  Board-Certified Sports and Orthopaedic Specialist  1/27/2023

## 2023-01-27 NOTE — PROGRESS NOTES
[facility]   Physical Therapy Treatment Note     Name: Mike Aburto  Clinic Number: [id number]    Therapy Diagnosis:   Encounter Diagnoses   Name Primary?    Decreased strength of lower extremity Yes    Decreased range of motion (ROM) of left knee     Decreased functional mobility and endurance        Physician: Danial Ribera MD    Visit Date: 1/31/2023    Physician Orders: PT Eval and Treat   Medical Diagnosis from Referral: Injury to multiple structures of left knee [S89.92XA]  Evaluation Date: 1/25/2023  Authorization Period Expiration: 1/26/23  Plan of Care Expiration: 4/25/23  Progress Note Due: 2/25/23  Visit # / Visits authorized: 2/ 20   FOTO: Not performed    PTA Visit #: 0/5     Postoperative Plan:  ACL with meniscus repair protocol  Weight bearing:  Nonweightbearing for 6 weeks  Range of motion:  0-90 for 4  Antibiotics: 5 days of PO prophylactic antibiotics  DVT Ppx:  Aspirin daily  PT/OT: Start POD#3  Follow-up: 10-14 days with AP/Lateral of operative knee (non-weightbearing)    Time In: 2:00 PM  Time Out: 3:30 PM  Total Billable Time: 90 minutes    SUBJECTIVE     Pt reports: he is doing well today. He reports he has still having a good bit of pain in his knee but has been working on his exercises consistently at home.  He was compliant with home exercise program.  Response to previous treatment: no notable change  Functional change: no notable change    Pain: 9/10  Location: left knee     OBJECTIVE     Objective Measures updated at progress report unless specified.     1/27:   Edema:  3 cm superior to patella: 51 cm  Mid patella: 48 cm  3 cm inferior to patella: 43.5 cm    Extension: 0 degrees  Flexion: 85 degrees    Treatment     Mike received the treatments listed below:      therapeutic exercises to develop strength, endurance, ROM, flexibility, posture, and core stabilization for 25 minutes including:  - Heel Prop - 0# - 5 minutes              - L gastroc stretch  with strap - 2 minutes              - L hamstring stretch seated - 2 minutes   - seated edge of bed slow lowering to improve knee flexion range of motion within protocol   - prone hip extension L - 3x10   - SL Hip Abduction L - 3x10     manual therapy techniques:  for 25 minutes, including:              - knee PROM to improve flexion and extension range of motion   - patella mobilizations     neuromuscular re-education activities to improve: Balance, Coordination, Kinesthetic, Sense, Proprioception, and Posture for 30 minutes. The following activities were included:    - Quad set without Sudanese - 5 minutes  - Quad set with Sudanese - 5 minutes   - SAQ with Sudanese - 5 minutes   - LAQ with Sudanese - 5 minutes   - SLR with Sudanese - 5 minutes        therapeutic activities to improve functional performance for 0  minutes, including:              - none performed today     gait training to improve functional mobility and safety for 0  minutes, including:              - none performed today     supervised modalities after being cleared for contradictions: IFC Electrical Stimulation: 20 minutes Mike received IFC Electrical Stimulation for pain control applied to the knee. Mike tolderated treatment well without any adverse effects.        Patient Education and Home Exercises     Home Exercises Provided and Patient Education Provided     Education provided:   - importance of consistency with HEP  - role of physical therapy for this condition  - reeducation on post-op precautions    Written Home Exercises Provided: Patient instructed to cont prior HEP. Exercises were reviewed and Mike was able to demonstrate them prior to the end of the session.  Mike demonstrated good  understanding of the education provided. See EMR under Patient Instructions for exercises provided during therapy sessions    ASSESSMENT     Notable improvement in quadriceps development compared to last visit. Patient will benefit from continued  physical therapy care.    Mike Is progressing well towards his goals.   Pt prognosis is Good.     Pt will continue to benefit from skilled outpatient physical therapy to address the deficits listed in the problem list box on initial evaluation, provide pt/family education and to maximize pt's level of independence in the home and community environment.     Pt's spiritual, cultural and educational needs considered and pt agreeable to plan of care and goals.     Anticipated barriers to physical therapy: none    Goals:  Short-Term Goals: 6 weeks  - The patient will be independent with initial home exercise program. (Progressing)  - The patient will increase strength to at least 3+/5 to perform functional mobility including gait and sit to stand (Progressing)  - The patient will increase knee ROM grossly to 0-90 degrees to perform ADLs with pain < 5/10. (Progressing)     Long-Term Goals: 12 weeks  - Pt to achieve <__% limitation as measured by the FOTO to demonstrate decreased disability.   - The patient will be independent amb with no assistive device on all surfaces for community distances. (Progressing)   - The patient will increase strength to at least 4+/5 to perform functional mobility including gait, stairs.(Progressing)  - The patient will increase knee ROM grossly to WFL to perform all ADLs with pain < 3/10.(Progressing)    PLAN     Plan of care Certification: 1/25/2023 to 4/25/23.     Outpatient Physical Therapy 2 times weekly for 12 weeks to include the following interventions: Manual Therapy, Moist Heat/ Ice, Neuromuscular Re-ed, Patient Education, Therapeutic Activities, Therapeutic Exercise, Gait Training, Electrical Stimulation PRN, and Dry needling PRN.     This patient had a ligamentous repair in his L knee. I expect he will require 6-9 months of physical therapy to safely return to his prior level of function to allow for restored strength and adequate tissue healing.    Elliott Hartley, PT

## 2023-01-30 ENCOUNTER — OFFICE VISIT (OUTPATIENT)
Dept: ORTHOPEDICS | Facility: CLINIC | Age: 33
End: 2023-01-30
Payer: COMMERCIAL

## 2023-01-30 VITALS — BODY MASS INDEX: 37.12 KG/M2 | WEIGHT: 231 LBS | HEIGHT: 66 IN

## 2023-01-30 DIAGNOSIS — Z98.890 S/P ACL RECONSTRUCTION: ICD-10-CM

## 2023-01-30 DIAGNOSIS — S83.512A CHRONIC RUPTURE OF ACL OF LEFT KNEE: ICD-10-CM

## 2023-01-30 DIAGNOSIS — Z98.890 POST-OPERATIVE STATE: Primary | ICD-10-CM

## 2023-01-30 DIAGNOSIS — S83.282A TEAR OF LATERAL MENISCUS OF LEFT KNEE, CURRENT, UNSPECIFIED TEAR TYPE, INITIAL ENCOUNTER: ICD-10-CM

## 2023-01-30 DIAGNOSIS — G89.18 POSTOPERATIVE PAIN: ICD-10-CM

## 2023-01-30 DIAGNOSIS — S83.242A TEAR OF MEDIAL MENISCUS OF LEFT KNEE, CURRENT, UNSPECIFIED TEAR TYPE, INITIAL ENCOUNTER: ICD-10-CM

## 2023-01-30 DIAGNOSIS — M25.562 LEFT KNEE PAIN, UNSPECIFIED CHRONICITY: ICD-10-CM

## 2023-01-30 PROCEDURE — 1160F PR REVIEW ALL MEDS BY PRESCRIBER/CLIN PHARMACIST DOCUMENTED: ICD-10-PCS | Mod: CPTII,S$GLB,, | Performed by: PHYSICIAN ASSISTANT

## 2023-01-30 PROCEDURE — 99024 PR POST-OP FOLLOW-UP VISIT: ICD-10-PCS | Mod: S$GLB,,, | Performed by: PHYSICIAN ASSISTANT

## 2023-01-30 PROCEDURE — 1159F MED LIST DOCD IN RCRD: CPT | Mod: CPTII,S$GLB,, | Performed by: PHYSICIAN ASSISTANT

## 2023-01-30 PROCEDURE — 3008F PR BODY MASS INDEX (BMI) DOCUMENTED: ICD-10-PCS | Mod: CPTII,S$GLB,, | Performed by: PHYSICIAN ASSISTANT

## 2023-01-30 PROCEDURE — 1159F PR MEDICATION LIST DOCUMENTED IN MEDICAL RECORD: ICD-10-PCS | Mod: CPTII,S$GLB,, | Performed by: PHYSICIAN ASSISTANT

## 2023-01-30 PROCEDURE — 1160F RVW MEDS BY RX/DR IN RCRD: CPT | Mod: CPTII,S$GLB,, | Performed by: PHYSICIAN ASSISTANT

## 2023-01-30 PROCEDURE — 99999 PR PBB SHADOW E&M-EST. PATIENT-LVL III: CPT | Mod: PBBFAC,,, | Performed by: PHYSICIAN ASSISTANT

## 2023-01-30 PROCEDURE — 99024 POSTOP FOLLOW-UP VISIT: CPT | Mod: S$GLB,,, | Performed by: PHYSICIAN ASSISTANT

## 2023-01-30 PROCEDURE — 99999 PR PBB SHADOW E&M-EST. PATIENT-LVL III: ICD-10-PCS | Mod: PBBFAC,,, | Performed by: PHYSICIAN ASSISTANT

## 2023-01-30 PROCEDURE — 3008F BODY MASS INDEX DOCD: CPT | Mod: CPTII,S$GLB,, | Performed by: PHYSICIAN ASSISTANT

## 2023-01-30 RX ORDER — OXYCODONE AND ACETAMINOPHEN 5; 325 MG/1; MG/1
1 TABLET ORAL
Qty: 15 TABLET | Refills: 0 | Status: SHIPPED | OUTPATIENT
Start: 2023-01-30

## 2023-01-30 NOTE — PROGRESS NOTES
Patient ID: Mike Aburto  YOB: 1990  MRN: [id number]    Chief Complaint: Post-op Evaluation of the Left Knee    Referred By: Dr. Bonilla    History of Present Illness: Mike Aburto is a  32 y.o. male    with a chief complaint of Post-op Evaluation of the Left Knee    Mike Aburto presents today 1 weeks status post ACL recon on 1/24/2023. He presents NWB with a brace and crutches. The patient has started physical therapy at [facility]. He has been in severe pain since surgery. States that he is out of his medications.     Past Medical History:   Past Medical History:   Diagnosis Date    Allergy     Seasonal allergies      Past Surgical History:   Procedure Laterality Date    ARTHROSCOPY,KNEE,WITH MENISCUS REPAIR Left 1/24/2023    Procedure: ARTHROSCOPY,KNEE,WITH MENISCUS REPAIR;  Surgeon: Danial Ribera MD;  Location: [facility];  Service: Orthopedics;  Laterality: Left;  medial and lateral repair    FRACTURE SURGERY      left lower arm    HAND SURGERY Right 2011    RECONSTRUCTION OF ANTERIOR CRUCIATE LIGAMENT USING GRAFT Left 1/24/2023    Procedure: RECONSTRUCTION, KNEE, ACL, USING GRAFT;  Surgeon: Danial Ribera MD;  Location: [facility];  Service: Orthopedics;  Laterality: Left;  Left knee arthroscopy, anterior cruciate ligament reconstruction with quadriceps tendon autograft    TONSILLECTOMY       Family History   Problem Relation Age of Onset    No Known Problems Mother     No Known Problems Father     Cancer Maternal Uncle     Cancer Maternal Grandfather     Diabetes Paternal Grandmother      Social History     Socioeconomic History    Marital status: Single   Tobacco Use    Smoking status: Every Day     Packs/day: 0.50     Types: Cigarettes    Smokeless tobacco: Never   Substance and Sexual Activity    Alcohol use: Yes     Comment: occassionally    Drug use: Never    Sexual activity: Yes     Partners: Female     Medication List with Changes/Refills   Current  Medications    ASPIRIN (ECOTRIN) 325 MG EC TABLET    Take 1 tablet (325 mg total) by mouth once daily. Complete full 6 weeks    DEXTROAMPHETAMINE-AMPHETAMINE 10 MG TAB    Take 1 tablet (10 mg total) by mouth once daily.    DOCUSATE SODIUM (COLACE) 100 MG CAPSULE    Take 1 capsule (100 mg total) by mouth 2 (two) times daily.    ERGOCALCIFEROL (ERGOCALCIFEROL) 50,000 UNIT CAP    Take by mouth.    GABAPENTIN (NEURONTIN) 600 MG TABLET    Take 1 tablet (600 mg total) by mouth 3 (three) times daily.    ONDANSETRON (ZOFRAN) 4 MG TABLET    Take 1 tablet (4 mg total) by mouth every 8 (eight) hours as needed for Nausea.    PHENTERMINE (ADIPEX-P) 37.5 MG TABLET    Take 37.5 mg by mouth.   Changed and/or Refilled Medications    Modified Medication Previous Medication    OXYCODONE-ACETAMINOPHEN (PERCOCET) 5-325 MG PER TABLET oxyCODONE-acetaminophen (PERCOCET) 5-325 mg per tablet       Take 1 tablet by mouth every 4 to 6 hours as needed for Pain.    Take 1 to 2 tablets by mouth every 4 to 6 hours as needed for pain   Discontinued Medications    OXYCODONE (ROXICODONE) 5 MG IMMEDIATE RELEASE TABLET    Take 1 tablet (5 mg total) by mouth every 4 (four) hours as needed for Pain (For break through pain).     Review of patient's allergies indicates:  No Known Allergies  ROS    Physical Exam:   Body mass index is 37.28 kg/m².  There were no vitals filed for this visit.   GENERAL: Well appearing, appropriate for stated age, no acute distress.  CARDIOVASCULAR: Pulses regular by peripheral palpation.  PULMONARY: Respirations are even and non-labored.  NEURO: Awake, alert, and oriented x 3.  PSYCH: Mood & affect are appropriate.  HEENT: Head is normocephalic and atraumatic.    Ortho/SPM Exam  Left Knee Exam  Sutures removed, incision sites clean dry and intact, no signs of infection  Minimal effusion   Knee ROM full extension to 90 degrees flexion   All compartments are soft and compressible. Calf soft non-tender. Intact EHL, FHL,  gastrocsoleus, and tibialis anterior. Sensation intact to light touch in superficial peroneal, deep peroneal, tibial, sural, and saphenous nerve distributions. Foot warm and well perfused with capillary refill of less than 2 seconds and palpable pedal pulses.       Imaging:   XR Results:  Results for orders placed during the hospital encounter of 01/24/23    X-Ray Knee 1 or 2 View Left    Narrative  EXAM: XR KNEE 1 OR 2 VIEW LEFT    CLINICAL INDICATION:  intra op imaging    Air Kerma: 0.17 mGy    FINDINGS and    Impression  Fluoroscopic guidance for left knee procedure. Please see procedural dictation for further details.    Finalized on: 1/25/2023 8:37 AM By:  Jere Gamble MD  [facility]# [id number]      2023-01-25 08:[id number]    [facility]    Relevant imaging results reviewed and interpreted by me, discussed with the patient and / or family today.      Patient Instructions   Assessment:  Mike Aburto is a  32 y.o. male    with a chief complaint of Post-op Evaluation of the Left Knee  1 weeks status post ACL recon on 1/24/2023  Postoperative pain    Encounter Diagnoses   Name Primary?    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    Leave Review:   Google: Leave Google Review  Healthgrades: Leave Healthgrades Review    After Hours Number: [phone]      Provider Note/Medical Decision Making:   Patient continues to have postoperative pain at this time will discussed pain relieving modalities outside pain medicine.  We will represcribe Percocet 5 mg dispensing 15 no refills.      I  discussed worrisome and red flag signs and symptoms with the patient. The patient expressed understanding and agreed to alert me immediately or to go to the emergency room if they experience any of these.   Treatment plan was developed with input from the patient/family, and they expressed understanding and agreement with the plan. All questions were answered today.      Disclaimer: This note was prepared using a voice recognition system and is likely to have sound alike errors within the text.

## 2023-01-31 ENCOUNTER — PATIENT MESSAGE (OUTPATIENT)
Dept: ORTHOPEDICS | Facility: CLINIC | Age: 33
End: 2023-01-31
Payer: COMMERCIAL

## 2023-01-31 ENCOUNTER — CLINICAL SUPPORT (OUTPATIENT)
Dept: REHABILITATION | Facility: HOSPITAL | Age: 33
End: 2023-01-31
Payer: COMMERCIAL

## 2023-01-31 DIAGNOSIS — R29.898 DECREASED STRENGTH OF LOWER EXTREMITY: Primary | ICD-10-CM

## 2023-01-31 DIAGNOSIS — Z74.09 DECREASED FUNCTIONAL MOBILITY AND ENDURANCE: ICD-10-CM

## 2023-01-31 DIAGNOSIS — M25.662 DECREASED RANGE OF MOTION (ROM) OF LEFT KNEE: ICD-10-CM

## 2023-01-31 DIAGNOSIS — Z98.890 POST-OPERATIVE STATE: Primary | ICD-10-CM

## 2023-01-31 DIAGNOSIS — Z98.890 S/P ACL RECONSTRUCTION: ICD-10-CM

## 2023-01-31 PROCEDURE — 97140 MANUAL THERAPY 1/> REGIONS: CPT | Mod: PN

## 2023-01-31 PROCEDURE — 97112 NEUROMUSCULAR REEDUCATION: CPT | Mod: PN

## 2023-01-31 PROCEDURE — 97110 THERAPEUTIC EXERCISES: CPT | Mod: PN

## 2023-01-31 PROCEDURE — 97014 ELECTRIC STIMULATION THERAPY: CPT | Mod: PN

## 2023-02-01 ENCOUNTER — CLINICAL SUPPORT (OUTPATIENT)
Dept: REHABILITATION | Facility: HOSPITAL | Age: 33
End: 2023-02-01
Payer: COMMERCIAL

## 2023-02-01 DIAGNOSIS — M25.662 DECREASED RANGE OF MOTION (ROM) OF LEFT KNEE: ICD-10-CM

## 2023-02-01 DIAGNOSIS — R29.898 DECREASED STRENGTH OF LOWER EXTREMITY: Primary | ICD-10-CM

## 2023-02-01 DIAGNOSIS — Z74.09 DECREASED FUNCTIONAL MOBILITY AND ENDURANCE: ICD-10-CM

## 2023-02-01 PROCEDURE — 97014 ELECTRIC STIMULATION THERAPY: CPT | Mod: PN

## 2023-02-01 PROCEDURE — 97112 NEUROMUSCULAR REEDUCATION: CPT | Mod: PN

## 2023-02-01 PROCEDURE — 97110 THERAPEUTIC EXERCISES: CPT | Mod: PN

## 2023-02-01 PROCEDURE — 97140 MANUAL THERAPY 1/> REGIONS: CPT | Mod: PN

## 2023-02-03 ENCOUNTER — CLINICAL SUPPORT (OUTPATIENT)
Dept: REHABILITATION | Facility: HOSPITAL | Age: 33
End: 2023-02-03
Payer: COMMERCIAL

## 2023-02-03 DIAGNOSIS — Z74.09 DECREASED FUNCTIONAL MOBILITY AND ENDURANCE: ICD-10-CM

## 2023-02-03 DIAGNOSIS — M25.662 DECREASED RANGE OF MOTION (ROM) OF LEFT KNEE: ICD-10-CM

## 2023-02-03 DIAGNOSIS — R29.898 DECREASED STRENGTH OF LOWER EXTREMITY: Primary | ICD-10-CM

## 2023-02-03 PROCEDURE — 97140 MANUAL THERAPY 1/> REGIONS: CPT | Mod: PN

## 2023-02-03 PROCEDURE — 97014 ELECTRIC STIMULATION THERAPY: CPT | Mod: PN

## 2023-02-03 PROCEDURE — 97112 NEUROMUSCULAR REEDUCATION: CPT | Mod: PN

## 2023-02-03 PROCEDURE — 97110 THERAPEUTIC EXERCISES: CPT | Mod: PN

## 2023-02-03 NOTE — PROGRESS NOTES
[facility]   Physical Therapy Treatment Note     Name: Mike Aburto  Clinic Number: [id number]    Therapy Diagnosis:   Encounter Diagnoses   Name Primary?    Decreased strength of lower extremity Yes    Decreased range of motion (ROM) of left knee     Decreased functional mobility and endurance            Physician: Danial Ribera MD    Visit Date: 2/3/2023    Physician Orders: PT Eval and Treat   Medical Diagnosis from Referral: Injury to multiple structures of left knee [S89.92XA]  Evaluation Date: 1/25/2023  Authorization Period Expiration: 1/26/23  Plan of Care Expiration: 4/25/23  Progress Note Due: 2/25/23  Visit # / Visits authorized: 4/ 20   FOTO: Not performed    PTA Visit #: 0/5     Postoperative Plan:  ACL with meniscus repair protocol  Weight bearing:  Nonweightbearing for 6 weeks  Range of motion:  0-90 for 4  Antibiotics: 5 days of PO prophylactic antibiotics  DVT Ppx:  Aspirin daily  PT/OT: Start POD#3  Follow-up: 10-14 days with AP/Lateral of operative knee (non-weightbearing)    Time In: 2:50 PM  Time Out: 4:00 PM  Total Billable Time: 70 minutes    SUBJECTIVE     Pt reports: he has continued to have a good bit of pain but he feels like his leg is moving better overall.  He was compliant with home exercise program.  Response to previous treatment: no notable change  Functional change: no notable change    Pain: 8/10  Location: left knee     OBJECTIVE     Objective Measures updated at progress report unless specified.     1/27:   Edema:  3 cm superior to patella: 51 cm  Mid patella: 48 cm  3 cm inferior to patella: 43.5 cm    Extension: 0 degrees  Flexion: 85 degrees    Treatment     Mike received the treatments listed below:      therapeutic exercises to develop strength, endurance, ROM, flexibility, posture, and core stabilization for 25 minutes including:  - Heel Prop - 4# - 5 minutes              - L gastroc stretch with strap - 2 minutes              - L  hamstring stretch seated - 2 minutes   - prone hip extension L - 3x10   - Heel slides to 90 degrees - 10x10s     manual therapy techniques:  for 10 minutes, including:              - knee PROM to improve flexion and extension range of motion   - patella mobilizations     neuromuscular re-education activities to improve: Balance, Coordination, Kinesthetic, Sense, Proprioception, and Posture for 35 minutes. The following activities were included:   - Quad set with Palestinian - 5 minutes   - SAQ with Palestinian - 5 minutes   - LAQ with Palestinian - 5 minutes   - SLR with Palestinian - 5 minutes   - Prone Quad Sets - 5 minutes   - SL Hip Abduction L - 3x10      therapeutic activities to improve functional performance for 0  minutes, including:              - none performed today     gait training to improve functional mobility and safety for 0  minutes, including:              - none performed today     supervised modalities after being cleared for contradictions: Palestinian Electrical Stimulation: Mike received Russian Electrical Stimulation for pain control applied to the knee. Mike tolderated treatment well without any adverse effects.        Patient Education and Home Exercises     Home Exercises Provided and Patient Education Provided     Education provided:   - importance of consistency with HEP  - role of physical therapy for this condition  - reeducation on post-op precautions    Written Home Exercises Provided: Patient instructed to cont prior HEP. Exercises were reviewed and Mike was able to demonstrate them prior to the end of the session.  Mike demonstrated good  understanding of the education provided. See EMR under Patient Instructions for exercises provided during therapy sessions    ASSESSMENT     Patient tolerated today's treatment well. Quad control is improving. Full knee extension maintained today. Minor lag on SLR noted that is improved with verbal cueing and tactile cueing and Palestinian stim.    Mike Is  progressing well towards his goals.   Pt prognosis is Good.     Pt will continue to benefit from skilled outpatient physical therapy to address the deficits listed in the problem list box on initial evaluation, provide pt/family education and to maximize pt's level of independence in the home and community environment.     Pt's spiritual, cultural and educational needs considered and pt agreeable to plan of care and goals.     Anticipated barriers to physical therapy: none    Goals:  Short-Term Goals: 6 weeks  - The patient will be independent with initial home exercise program. (Progressing)  - The patient will increase strength to at least 3+/5 to perform functional mobility including gait and sit to stand (Progressing)  - The patient will increase knee ROM grossly to 0-90 degrees to perform ADLs with pain < 5/10. (Progressing)     Long-Term Goals: 12 weeks  - Pt to achieve <25% limitation as measured by the FOTO to demonstrate decreased disability.   - The patient will be independent amb with no assistive device on all surfaces for community distances. (Progressing)   - The patient will increase strength to at least 4+/5 to perform functional mobility including gait, stairs.(Progressing)  - The patient will increase knee ROM grossly to WFL to perform all ADLs with pain < 3/10.(Progressing)    PLAN     Plan of care Certification: 1/25/2023 to 4/25/23.     Outpatient Physical Therapy 2 times weekly for 12 weeks to include the following interventions: Manual Therapy, Moist Heat/ Ice, Neuromuscular Re-ed, Patient Education, Therapeutic Activities, Therapeutic Exercise, Gait Training, Electrical Stimulation PRN, and Dry needling PRN.     This patient had a ligamentous repair in his L knee. I expect he will require 6-9 months of physical therapy to safely return to his prior level of function to allow for restored strength and adequate tissue healing.    Elliott Hartley, PT

## 2023-02-03 NOTE — PATIENT INSTRUCTIONS
Assessment:  Mike Aburto is a  32 y.o. male    with a chief complaint of Post-op Evaluation of the Left Knee  1 weeks status post ACL recon on 1/24/2023  Postoperative pain    Encounter Diagnoses   Name Primary?    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    Leave Review:   Google: Leave Google Review  Healthgrades: Leave Healthgrades Review    After Hours Number: [phone]

## 2023-02-06 ENCOUNTER — TELEPHONE (OUTPATIENT)
Dept: PAIN MEDICINE | Facility: CLINIC | Age: 33
End: 2023-02-06
Payer: COMMERCIAL

## 2023-02-06 ENCOUNTER — OFFICE VISIT (OUTPATIENT)
Dept: ORTHOPEDICS | Facility: CLINIC | Age: 33
End: 2023-02-06
Payer: COMMERCIAL

## 2023-02-06 ENCOUNTER — CLINICAL SUPPORT (OUTPATIENT)
Dept: REHABILITATION | Facility: HOSPITAL | Age: 33
End: 2023-02-06
Payer: COMMERCIAL

## 2023-02-06 ENCOUNTER — HOSPITAL ENCOUNTER (OUTPATIENT)
Dept: RADIOLOGY | Facility: HOSPITAL | Age: 33
Discharge: HOME | End: 2023-02-06
Attending: PHYSICIAN ASSISTANT
Payer: COMMERCIAL

## 2023-02-06 VITALS — BODY MASS INDEX: 37.14 KG/M2 | WEIGHT: 231.06 LBS | HEIGHT: 66 IN

## 2023-02-06 DIAGNOSIS — Z74.09 DECREASED FUNCTIONAL MOBILITY AND ENDURANCE: ICD-10-CM

## 2023-02-06 DIAGNOSIS — Z98.890 S/P ACL RECONSTRUCTION: ICD-10-CM

## 2023-02-06 DIAGNOSIS — Z98.890 POST-OPERATIVE STATE: Primary | ICD-10-CM

## 2023-02-06 DIAGNOSIS — M25.662 DECREASED RANGE OF MOTION (ROM) OF LEFT KNEE: ICD-10-CM

## 2023-02-06 DIAGNOSIS — R29.898 DECREASED STRENGTH OF LOWER EXTREMITY: Primary | ICD-10-CM

## 2023-02-06 DIAGNOSIS — S83.512A CHRONIC RUPTURE OF ACL OF LEFT KNEE: ICD-10-CM

## 2023-02-06 DIAGNOSIS — S83.242A TEAR OF MEDIAL MENISCUS OF LEFT KNEE, CURRENT, UNSPECIFIED TEAR TYPE, INITIAL ENCOUNTER: ICD-10-CM

## 2023-02-06 DIAGNOSIS — G89.18 POSTOPERATIVE PAIN: ICD-10-CM

## 2023-02-06 DIAGNOSIS — S83.282A TEAR OF LATERAL MENISCUS OF LEFT KNEE, CURRENT, UNSPECIFIED TEAR TYPE, INITIAL ENCOUNTER: ICD-10-CM

## 2023-02-06 DIAGNOSIS — Z98.890 POST-OPERATIVE STATE: ICD-10-CM

## 2023-02-06 PROCEDURE — 97140 MANUAL THERAPY 1/> REGIONS: CPT | Mod: PN

## 2023-02-06 PROCEDURE — 99024 PR POST-OP FOLLOW-UP VISIT: ICD-10-PCS | Mod: S$GLB,,, | Performed by: PHYSICIAN ASSISTANT

## 2023-02-06 PROCEDURE — 97110 THERAPEUTIC EXERCISES: CPT | Mod: PN

## 2023-02-06 PROCEDURE — 97014 ELECTRIC STIMULATION THERAPY: CPT | Mod: PN

## 2023-02-06 PROCEDURE — 97112 NEUROMUSCULAR REEDUCATION: CPT | Mod: PN

## 2023-02-06 PROCEDURE — 73560 X-RAY EXAM OF KNEE 1 OR 2: CPT | Mod: 26,LT,, | Performed by: RADIOLOGY

## 2023-02-06 PROCEDURE — 73560 XR KNEE 1 OR 2 VIEW LEFT: ICD-10-PCS | Mod: 26,LT,, | Performed by: RADIOLOGY

## 2023-02-06 PROCEDURE — 99999 PR PBB SHADOW E&M-EST. PATIENT-LVL III: CPT | Mod: PBBFAC,,, | Performed by: PHYSICIAN ASSISTANT

## 2023-02-06 PROCEDURE — 73560 X-RAY EXAM OF KNEE 1 OR 2: CPT | Mod: TC,LT

## 2023-02-06 PROCEDURE — 99024 POSTOP FOLLOW-UP VISIT: CPT | Mod: S$GLB,,, | Performed by: PHYSICIAN ASSISTANT

## 2023-02-06 PROCEDURE — 99999 PR PBB SHADOW E&M-EST. PATIENT-LVL III: ICD-10-PCS | Mod: PBBFAC,,, | Performed by: PHYSICIAN ASSISTANT

## 2023-02-06 NOTE — PROGRESS NOTES
Patient ID: Mike Aburto  YOB: 1990  MRN: [id number]    Chief Complaint: Post-op Evaluation of the Left Knee    Referred By: Dr. Bonilla    History of Present Illness: Mike Aburto is a  32 y.o. male    with a chief complaint of Post-op Evaluation of the Left Knee    Mike Aburto presents today 1 weeks status post ACL recon on 1/24/2023. He presents NWB with a brace and crutches. The patient has started physical therapy at [facility]. He has been in severe pain since surgery. States that he is out of his medications.     Past Medical History:   Past Medical History:   Diagnosis Date    Allergy     Seasonal allergies      Past Surgical History:   Procedure Laterality Date    ARTHROSCOPY,KNEE,WITH MENISCUS REPAIR Left 1/24/2023    Procedure: ARTHROSCOPY,KNEE,WITH MENISCUS REPAIR;  Surgeon: Danial Ribera MD;  Location: [facility];  Service: Orthopedics;  Laterality: Left;  medial and lateral repair    FRACTURE SURGERY      left lower arm    HAND SURGERY Right 2011    RECONSTRUCTION OF ANTERIOR CRUCIATE LIGAMENT USING GRAFT Left 1/24/2023    Procedure: RECONSTRUCTION, KNEE, ACL, USING GRAFT;  Surgeon: Danial Ribera MD;  Location: [facility];  Service: Orthopedics;  Laterality: Left;  Left knee arthroscopy, anterior cruciate ligament reconstruction with quadriceps tendon autograft    TONSILLECTOMY       Family History   Problem Relation Age of Onset    No Known Problems Mother     No Known Problems Father     Cancer Maternal Uncle     Cancer Maternal Grandfather     Diabetes Paternal Grandmother      Social History     Socioeconomic History    Marital status: Single   Tobacco Use    Smoking status: Every Day     Packs/day: 0.50     Types: Cigarettes    Smokeless tobacco: Never   Substance and Sexual Activity    Alcohol use: Yes     Comment: occassionally    Drug use: Never    Sexual activity: Yes     Partners: Female     Medication List with  Changes/Refills   Current Medications    ASPIRIN (ECOTRIN) 325 MG EC TABLET    Take 1 tablet (325 mg total) by mouth once daily. Complete full 6 weeks    DEXTROAMPHETAMINE-AMPHETAMINE 10 MG TAB    Take 1 tablet (10 mg total) by mouth once daily.    DOCUSATE SODIUM (COLACE) 100 MG CAPSULE    Take 1 capsule (100 mg total) by mouth 2 (two) times daily.    ERGOCALCIFEROL (ERGOCALCIFEROL) 50,000 UNIT CAP    Take by mouth.    GABAPENTIN (NEURONTIN) 600 MG TABLET    Take 1 tablet (600 mg total) by mouth 3 (three) times daily.    ONDANSETRON (ZOFRAN) 4 MG TABLET    Take 1 tablet (4 mg total) by mouth every 8 (eight) hours as needed for Nausea.    OXYCODONE-ACETAMINOPHEN (PERCOCET) 5-325 MG PER TABLET    Take 1 tablet by mouth every 4 to 6 hours as needed for Pain.    PHENTERMINE (ADIPEX-P) 37.5 MG TABLET    Take 37.5 mg by mouth.     Review of patient's allergies indicates:  No Known Allergies  ROS    Physical Exam:   Body mass index is 37.29 kg/m².  There were no vitals filed for this visit.   GENERAL: Well appearing, appropriate for stated age, no acute distress.  CARDIOVASCULAR: Pulses regular by peripheral palpation.  PULMONARY: Respirations are even and non-labored.  NEURO: Awake, alert, and oriented x 3.  PSYCH: Mood & affect are appropriate.  HEENT: Head is normocephalic and atraumatic.    Ortho/SPM Exam  Left Knee Exam  Sutures removed, incision sites clean dry and intact, no signs of infection  Minimal effusion   Knee ROM full extension to 90 degrees flexion   All compartments are soft and compressible. Calf soft non-tender. Intact EHL, FHL, gastrocsoleus, and tibialis anterior. Sensation intact to light touch in superficial peroneal, deep peroneal, tibial, sural, and saphenous nerve distributions. Foot warm and well perfused with capillary refill of less than 2 seconds and palpable pedal pulses.       Imaging:   XR Results:  Results for orders placed during the hospital encounter of 01/24/23    X-Ray Knee 1 or 2  View Left    Narrative  EXAM: XR KNEE 1 OR 2 VIEW LEFT    CLINICAL INDICATION:  intra op imaging    Air Kerma: 0.17 mGy    FINDINGS and    Impression  Fluoroscopic guidance for left knee procedure. Please see procedural dictation for further details.    Finalized on: 1/25/2023 8:37 AM By:  Jere Gamble MD  [facility]# [id number]      2023-01-25 08:39:[id number]    [facility]    Relevant imaging results reviewed and interpreted by me, discussed with the patient and / or family today.      There are no Patient Instructions on file for this visit.  Provider Note/Medical Decision Making:   Patient continues to have postoperative pain at this time will discussed pain relieving modalities outside pain medicine.  We will represcribe Percocet 5 mg dispensing 15 no refills.      I discussed worrisome and red flag signs and symptoms with the patient. The patient expressed understanding and agreed to alert me immediately or to go to the emergency room if they experience any of these.   Treatment plan was developed with input from the patient/family, and they expressed understanding and agreement with the plan. All questions were answered today.      Disclaimer: This note was prepared using a voice recognition system and is likely to have sound alike errors within the text.

## 2023-02-06 NOTE — PROGRESS NOTES
[facility]   Physical Therapy Treatment Note     Name: Mike Aburto  Clinic Number: [id number]    Therapy Diagnosis:   Encounter Diagnoses   Name Primary?    Decreased strength of lower extremity Yes    Decreased range of motion (ROM) of left knee     Decreased functional mobility and endurance      Physician: Danial Ribera MD    Visit Date: 2/6/2023    Physician Orders: PT Eval and Treat   Medical Diagnosis from Referral: Injury to multiple structures of left knee [S89.92XA]  Evaluation Date: 1/25/2023  Authorization Period Expiration: 1/26/23  Plan of Care Expiration: 4/25/23  Progress Note Due: 2/25/23  Visit # / Visits authorized: 5/ 20   FOTO: Not performed    PTA Visit #: 0/5     Postoperative Plan:  ACL with meniscus repair protocol  Weight bearing:  Nonweightbearing for 6 weeks  Range of motion:  0-90 for 4  Antibiotics: 5 days of PO prophylactic antibiotics  DVT Ppx:  Aspirin daily  PT/OT: Start POD#3  Follow-up: 10-14 days with AP/Lateral of operative knee (non-weightbearing)    Time In: 3:30 PM  Time Out: 4:50 PM  Total Billable Time: 80 minutes    SUBJECTIVE     Pt reports: his MD appointment went well. He reports he is still dealing with a good bit of pain, especially at night. He reports he has been working on his HEP.  He was compliant with home exercise program.  Response to previous treatment: no notable change  Functional change: no notable change    Pain: 8/10  Location: left knee     OBJECTIVE     Objective Measures updated at progress report unless specified.     1/27:   Edema:  3 cm superior to patella: 51 cm  Mid patella: 48 cm  3 cm inferior to patella: 43.5 cm    Extension: 0 degrees  Flexion: 85 degrees    Treatment     Mike received the treatments listed below:      therapeutic exercises to develop strength, endurance, ROM, flexibility, posture, and core stabilization for 25 minutes including:  - Heel Prop - 4# - 5 minutes              - L gastroc  stretch with strap - 2 minutes              - L hamstring stretch seated - 2 minutes   - prone hip extension L - 3x10   - Heel slides to 90 degrees - 10x10s     manual therapy techniques:  for 10 minutes, including:              - knee PROM to improve flexion and extension range of motion   - patella mobilizations     neuromuscular re-education activities to improve: Balance, Coordination, Kinesthetic, Sense, Proprioception, and Posture for 45 minutes. The following activities were included:   - Quad set with Burkinan - 5 minutes   - SAQ with Burkinan - 5 minutes   - LAQ with Burkinan - 5 minutes   - SLR with Burkinan - 5 minutes   - Prone Quad Sets - 5 minutes   - SL Hip Abduction L - 3x10      therapeutic activities to improve functional performance for 0  minutes, including:              - none performed today     gait training to improve functional mobility and safety for 0  minutes, including:              - none performed today     supervised modalities after being cleared for contradictions: Burkinan Electrical Stimulation: Mike received Russian Electrical Stimulation for pain control applied to the knee. Mike tolderated treatment well without any adverse effects.        Patient Education and Home Exercises     Home Exercises Provided and Patient Education Provided     Education provided:   - importance of consistency with HEP  - role of physical therapy for this condition  - reeducation on post-op precautions    Written Home Exercises Provided: Patient instructed to cont prior HEP. Exercises were reviewed and Mike was able to demonstrate them prior to the end of the session.  Mike demonstrated good  understanding of the education provided. See EMR under Patient Instructions for exercises provided during therapy sessions    ASSESSMENT     Patient tolerated today's treatment well. Quad is developing appropriately. He will benefit from continued care.    Mike Is progressing well towards his goals.   Pt  prognosis is Good.     Pt will continue to benefit from skilled outpatient physical therapy to address the deficits listed in the problem list box on initial evaluation, provide pt/family education and to maximize pt's level of independence in the home and community environment.     Pt's spiritual, cultural and educational needs considered and pt agreeable to plan of care and goals.     Anticipated barriers to physical therapy: none    Goals:  Short-Term Goals: 6 weeks  - The patient will be independent with initial home exercise program. (Progressing)  - The patient will increase strength to at least 3+/5 to perform functional mobility including gait and sit to stand (Progressing)  - The patient will increase knee ROM grossly to 0-90 degrees to perform ADLs with pain < 5/10. (Progressing)     Long-Term Goals: 12 weeks  - Pt to achieve <25% limitation as measured by the FOTO to demonstrate decreased disability.   - The patient will be independent amb with no assistive device on all surfaces for community distances. (Progressing)   - The patient will increase strength to at least 4+/5 to perform functional mobility including gait, stairs.(Progressing)  - The patient will increase knee ROM grossly to WFL to perform all ADLs with pain < 3/10.(Progressing)    PLAN     Plan of care Certification: 1/25/2023 to 4/25/23.     Outpatient Physical Therapy 2 times weekly for 12 weeks to include the following interventions: Manual Therapy, Moist Heat/ Ice, Neuromuscular Re-ed, Patient Education, Therapeutic Activities, Therapeutic Exercise, Gait Training, Electrical Stimulation PRN, and Dry needling PRN.     This patient had a ligamentous repair in his L knee. I expect he will require 6-9 months of physical therapy to safely return to his prior level of function to allow for restored strength and adequate tissue healing.    Elliott Hartley, PT

## 2023-02-06 NOTE — PROGRESS NOTES
Patient ID: Mike Aburto  YOB: 1990  MRN: [id number]    Chief Complaint: Post-op Evaluation of the Left Knee    Referred By: Dr. Bonilla    History of Present Illness: Mike Aburto is a  32 y.o. male    with a chief complaint of Post-op Evaluation of the Left Knee    Mike Aburto presents today 2 weeks status post  L Knee ACL recon w/ quad auto, MMR, LMR, bone spur excision on 1/24/23. She presents NWB with brace/assistive devices. The patient has started physical therapy at [facility]. He reports that he is out of his percocet and is no longer taking his zofran or colace. Overall there are no issues or concerns.     Plan 1/30/23:  Percocet 5mg disp:15, no refills  Past Medical History:   Past Medical History:   Diagnosis Date    Allergy     Seasonal allergies      Past Surgical History:   Procedure Laterality Date    ARTHROSCOPY,KNEE,WITH MENISCUS REPAIR Left 1/24/2023    Procedure: ARTHROSCOPY,KNEE,WITH MENISCUS REPAIR;  Surgeon: Danial Ribera MD;  Location: [facility];  Service: Orthopedics;  Laterality: Left;  medial and lateral repair    FRACTURE SURGERY      left lower arm    HAND SURGERY Right 2011    RECONSTRUCTION OF ANTERIOR CRUCIATE LIGAMENT USING GRAFT Left 1/24/2023    Procedure: RECONSTRUCTION, KNEE, ACL, USING GRAFT;  Surgeon: Danial Ribera MD;  Location: [facility];  Service: Orthopedics;  Laterality: Left;  Left knee arthroscopy, anterior cruciate ligament reconstruction with quadriceps tendon autograft    TONSILLECTOMY       Family History   Problem Relation Age of Onset    No Known Problems Mother     No Known Problems Father     Cancer Maternal Uncle     Cancer Maternal Grandfather     Diabetes Paternal Grandmother      Social History     Socioeconomic History    Marital status: Single   Tobacco Use    Smoking status: Every Day     Packs/day: 0.50     Types: Cigarettes    Smokeless tobacco: Never   Substance and Sexual Activity    Alcohol use: Yes     Comment:  occassionally    Drug use: Never    Sexual activity: Yes     Partners: Female     Medication List with Changes/Refills   Current Medications    ASPIRIN (ECOTRIN) 325 MG EC TABLET    Take 1 tablet (325 mg total) by mouth once daily. Complete full 6 weeks    DEXTROAMPHETAMINE-AMPHETAMINE 10 MG TAB    Take 1 tablet (10 mg total) by mouth once daily.    DOCUSATE SODIUM (COLACE) 100 MG CAPSULE    Take 1 capsule (100 mg total) by mouth 2 (two) times daily.    ERGOCALCIFEROL (ERGOCALCIFEROL) 50,000 UNIT CAP    Take by mouth.    GABAPENTIN (NEURONTIN) 600 MG TABLET    Take 1 tablet (600 mg total) by mouth 3 (three) times daily.    ONDANSETRON (ZOFRAN) 4 MG TABLET    Take 1 tablet (4 mg total) by mouth every 8 (eight) hours as needed for Nausea.    OXYCODONE-ACETAMINOPHEN (PERCOCET) 5-325 MG PER TABLET    Take 1 tablet by mouth every 4 to 6 hours as needed for Pain.    PHENTERMINE (ADIPEX-P) 37.5 MG TABLET    Take 37.5 mg by mouth.     Review of patient's allergies indicates:  No Known Allergies  ROS    Physical Exam:   Body mass index is 37.29 kg/m².  There were no vitals filed for this visit.   GENERAL: Well appearing, appropriate for stated age, no acute distress.  CARDIOVASCULAR: Pulses regular by peripheral palpation.  PULMONARY: Respirations are even and non-labored.  NEURO: Awake, alert, and oriented x 3.  PSYCH: Mood & affect are appropriate.  HEENT: Head is normocephalic and atraumatic.    Ortho/SPM Exam  Left  Knee Exam  Sutures removed, incision sites clean dry and intact, no signs of infection  Minimal effusion   Knee ROM full extension to 70 degrees flexion   All compartments are soft and compressible. Calf soft non-tender. Intact EHL, FHL, gastrocsoleus, and tibialis anterior. Sensation intact to light touch in superficial peroneal, deep peroneal, tibial, sural, and saphenous nerve distributions. Foot warm and well perfused with capillary refill of less than 2 seconds and palpable pedal pulses.       Imaging:    XR Results:  Results for orders placed during the hospital encounter of 01/24/23    X-Ray Knee 1 or 2 View Left    Narrative  EXAM: XR KNEE 1 OR 2 VIEW LEFT    CLINICAL INDICATION:  intra op imaging    Air Kerma: 0.17 mGy    FINDINGS and    Impression  Fluoroscopic guidance for left knee procedure. Please see procedural dictation for further details.    Finalized on: 1/25/2023 8:37 AM By:  Jere Gamble MD  [facility]# [id number]      2023-01-25 08:39:[id number]    [facility]    Relevant imaging results reviewed and interpreted by me, discussed with the patient and / or family today.      Patient Instructions   Assessment:  Mike Aburto is a  32 y.o. male    with a chief complaint of Post-op Evaluation of the Left Knee  2 weeks status post  L Knee ACL recon w/ quad auto, MMR, LMR, bone spur excision on 1/24/23  Post-op    Encounter Diagnoses   Name Primary?    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   Non-weight bearing x6 weeks  Range of motion 0-90 x4 weeks  Continue Aspirin as scheduled  Pt requested a referral to pain management   Follow up in 2 weeks  ** Will order functional brace at next visit**    Follow-up: 2 weeks or sooner if there are any problems between now and then.    Leave Review:   Google: Leave Google Review  Healthgrades: Leave Healthgrades Review    After Hours Number: [phone]      Provider Note/Medical Decision Making:   Patient's  complaints regarding pain mainly located at quad incision site.   Has already requested 1 refill of Percocet which was refilled on 1/31/23.  At this time patient states he would a referral to Pain  Management to help with pain control.  Discussed continued physical therapy and restrictions.   Reviewed imaging and pictures in detail    I discussed worrisome and red flag signs and symptoms with the patient. The patient expressed understanding and agreed to alert me immediately or to go to the emergency room if they experience any of these.   Treatment plan was developed with input from the patient/family, and they expressed understanding and agreement with the plan. All questions were answered today.        Disclaimer: This note was prepared using a voice recognition system and is likely to have sound alike errors within the text.

## 2023-02-06 NOTE — PATIENT INSTRUCTIONS
Assessment:  Mike Aburto is a  32 y.o. male    with a chief complaint of Post-op Evaluation of the Left Knee  2 weeks status post  L Knee ACL recon w/ quad auto, MMR, LMR, bone spur excision on 1/24/23  Post-op    Encounter Diagnoses   Name Primary?    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   Non-weight bearing x6 weeks  Range of motion 0-90 x4 weeks  Continue Aspirin as scheduled  Pt requested a referral to pain management   Follow up in 2 weeks  ** Will order functional brace at next visit**    Follow-up: 2 weeks or sooner if there are any problems between now and then.    Leave Review:   Google: Leave Google Review  Healthgrades: Leave Healthgrades Review    After Hours Number: [phone]

## 2023-02-08 ENCOUNTER — CLINICAL SUPPORT (OUTPATIENT)
Dept: REHABILITATION | Facility: HOSPITAL | Age: 33
End: 2023-02-08
Payer: COMMERCIAL

## 2023-02-08 DIAGNOSIS — M25.662 DECREASED RANGE OF MOTION (ROM) OF LEFT KNEE: ICD-10-CM

## 2023-02-08 DIAGNOSIS — R29.898 DECREASED STRENGTH OF LOWER EXTREMITY: Primary | ICD-10-CM

## 2023-02-08 DIAGNOSIS — Z74.09 DECREASED FUNCTIONAL MOBILITY AND ENDURANCE: ICD-10-CM

## 2023-02-08 PROCEDURE — 97014 ELECTRIC STIMULATION THERAPY: CPT | Mod: PN

## 2023-02-08 PROCEDURE — 97112 NEUROMUSCULAR REEDUCATION: CPT | Mod: PN

## 2023-02-08 PROCEDURE — 97110 THERAPEUTIC EXERCISES: CPT | Mod: PN

## 2023-02-08 PROCEDURE — 97140 MANUAL THERAPY 1/> REGIONS: CPT | Mod: PN

## 2023-02-08 NOTE — PROGRESS NOTES
[facility]   Physical Therapy Treatment Note     Name: Mike Aburto  Clinic Number: [id number]    Therapy Diagnosis:   Encounter Diagnoses   Name Primary?    Decreased strength of lower extremity Yes    Decreased range of motion (ROM) of left knee     Decreased functional mobility and endurance      Physician: Danial Ribera MD    Visit Date: 2/8/2023    Physician Orders: PT Eval and Treat   Medical Diagnosis from Referral: Injury to multiple structures of left knee [S89.92XA]  Evaluation Date: 1/25/2023  Authorization Period Expiration: 1/26/23  Plan of Care Expiration: 4/25/23  Progress Note Due: 2/25/23  Visit # / Visits authorized: 6/ 20   FOTO: Not performed    PTA Visit #: 0/5     Postoperative Plan:  ACL with meniscus repair protocol  Weight bearing:  Nonweightbearing for 6 weeks  Range of motion:  0-90 for 4  Antibiotics: 5 days of PO prophylactic antibiotics  DVT Ppx:  Aspirin daily  PT/OT: Start POD#3  Follow-up: 10-14 days with AP/Lateral of operative knee (non-weightbearing)    Time In: 12:00 PM  Time Out: 1:25 PM  Total Billable Time: 85 minutes    SUBJECTIVE     Pt reports: he has continued to have ups and downs with his pain. He reports today is a better day overall. He has no new complaints.  He was compliant with home exercise program.  Response to previous treatment: no notable change  Functional change: no notable change    Pain: 8/10  Location: left knee     OBJECTIVE     Objective Measures updated at progress report unless specified.     1/27:   Edema:  3 cm superior to patella: 51 cm  Mid patella: 48 cm  3 cm inferior to patella: 43.5 cm    Extension: 0 degrees  Flexion: 85 degrees    Treatment     Mike received the treatments listed below:      therapeutic exercises to develop strength, endurance, ROM, flexibility, posture, and core stabilization for 25 minutes including:  - Heel Prop - 7# - 5 minutes              - L gastroc stretch with strap - 2  minutes              - L hamstring stretch seated - 2 minutes   - prone hip extension L - 3x10   - Heel slides to 90 degrees - 10x10s     manual therapy techniques:  for 10 minutes, including:              - knee PROM to improve flexion and extension range of motion   - patella mobilizations     neuromuscular re-education activities to improve: Balance, Coordination, Kinesthetic, Sense, Proprioception, and Posture for 40 minutes. The following activities were included:   - Quad set with Citizen of [place] - 5 minutes   - SAQ with Citizen of [place] - 5 minutes   - LAQ in seated with Citizen of [place] - 5 minutes   - LAQ in Supine with Citizen of [place] - 5 minutes   - SLR with Citizen of [place] - 5 minutes   - Prone Quad Sets - 5 minutes   - SL Hip Abduction B - 3x10      therapeutic activities to improve functional performance for 0  minutes, including:              - none performed today     gait training to improve functional mobility and safety for 0  minutes, including:              - none performed today     supervised modalities after being cleared for contradictions: Citizen of [place] Electrical Stimulation: Mike received Russian Electrical Stimulation for pain control applied to the knee. Mike tolderated treatment well without any adverse effects.        Patient Education and Home Exercises     Home Exercises Provided and Patient Education Provided     Education provided:   - importance of consistency with HEP  - role of physical therapy for this condition  - reeducation on post-op precautions    Written Home Exercises Provided: Patient instructed to cont prior HEP. Exercises were reviewed and Mike was able to demonstrate them prior to the end of the session.  Mike demonstrated good  understanding of the education provided. See EMR under Patient Instructions for exercises provided during therapy sessions    ASSESSMENT     Patient tolerated today's treatment well. Quad development and range of motion are progressing appropriately for his post-op status. He will  benefit from continued care.    Mike Is progressing well towards his goals.   Pt prognosis is Good.     Pt will continue to benefit from skilled outpatient physical therapy to address the deficits listed in the problem list box on initial evaluation, provide pt/family education and to maximize pt's level of independence in the home and community environment.     Pt's spiritual, cultural and educational needs considered and pt agreeable to plan of care and goals.     Anticipated barriers to physical therapy: none    Goals:  Short-Term Goals: 6 weeks  - The patient will be independent with initial home exercise program. (Progressing)  - The patient will increase strength to at least 3+/5 to perform functional mobility including gait and sit to stand (Progressing)  - The patient will increase knee ROM grossly to 0-90 degrees to perform ADLs with pain < 5/10. (Progressing)     Long-Term Goals: 12 weeks  - Pt to achieve <25% limitation as measured by the FOTO to demonstrate decreased disability.   - The patient will be independent amb with no assistive device on all surfaces for community distances. (Progressing)   - The patient will increase strength to at least 4+/5 to perform functional mobility including gait, stairs.(Progressing)  - The patient will increase knee ROM grossly to WFL to perform all ADLs with pain < 3/10.(Progressing)    PLAN     Plan of care Certification: 1/25/2023 to 4/25/23.     Outpatient Physical Therapy 2 times weekly for 12 weeks to include the following interventions: Manual Therapy, Moist Heat/ Ice, Neuromuscular Re-ed, Patient Education, Therapeutic Activities, Therapeutic Exercise, Gait Training, Electrical Stimulation PRN, and Dry needling PRN.     This patient had a ligamentous repair in his L knee. I expect he will require 6-9 months of physical therapy to safely return to his prior level of function to allow for restored strength and adequate tissue healing.    Elliott Hartley, PT

## 2023-02-08 NOTE — PROGRESS NOTES
[facility]   Physical Therapy Treatment Note     Name: Mike Aburto  Clinic Number: [id number]    Therapy Diagnosis:   Encounter Diagnoses   Name Primary?    Decreased strength of lower extremity Yes    Decreased range of motion (ROM) of left knee     Decreased functional mobility and endurance        Physician: Danial Ribera MD    Visit Date: 2/9/2023    Physician Orders: PT Eval and Treat   Medical Diagnosis from Referral: Injury to multiple structures of left knee [S89.92XA]  Evaluation Date: 1/25/2023  Authorization Period Expiration: 1/26/23  Plan of Care Expiration: 4/25/23  Progress Note Due: 2/25/23  Visit # / Visits authorized: 7/ 20   FOTO: Not performed    PTA Visit #: 0/5     Postoperative Plan:  ACL with meniscus repair protocol  Weight bearing:  Nonweightbearing for 6 weeks  Range of motion:  0-90 for 4  Antibiotics: 5 days of PO prophylactic antibiotics  DVT Ppx:  Aspirin daily  PT/OT: Start POD#3  Follow-up: 10-14 days with AP/Lateral of operative knee (non-weightbearing)    Time In: 10:50 PM  Time Out: 12:00 PM  Total Billable Time: 70 minutes    SUBJECTIVE     Pt reports: Patient reports he is doing well. He reports that his pain is getting better overall. He reports he has been consistent with his exercises at home.  He was compliant with home exercise program.  Response to previous treatment: no notable change  Functional change: no notable change    Pain: 8/10  Location: left knee     OBJECTIVE     Objective Measures updated at progress report unless specified.       Treatment     Mike received the treatments listed below:      therapeutic exercises to develop strength, endurance, ROM, flexibility, posture, and core stabilization for 25 minutes including:  - Heel Prop - 7# - 5 minutes              - L gastroc stretch with strap - 2 minutes              - L hamstring stretch seated - 2 minutes   - prone hip extension L - 3x10   - Heel slides to 90 degrees -  10x10s     manual therapy techniques:  for 10 minutes, including:              - knee PROM to improve flexion and extension range of motion   - patella mobilizations     neuromuscular re-education activities to improve: Balance, Coordination, Kinesthetic, Sense, Proprioception, and Posture for 30 minutes. The following activities were included:   - Quad set with Niue[place] - 5 minutes   - SAQ with Ni[place] - 5 minutes   - LAQ in seated with [place] - 5 minutes   - LAQ in Supine with [place] - 5 minutes   - SLR with [place]uean - 5 minutes   - Prone Quad Sets - 3x10   - SL Hip Abduction B - 3x10      therapeutic activities to improve functional performance for 0  minutes, including:              - none performed today     gait training to improve functional mobility and safety for 0  minutes, including:              - none performed today     supervised modalities after being cleared for contradictions: [place] Electrical Stimulation: Mike received Russian Electrical Stimulation for pain control applied to the knee. Mike tolderated treatment well without any adverse effects.        Patient Education and Home Exercises     Home Exercises Provided and Patient Education Provided     Education provided:   - importance of consistency with HEP  - role of physical therapy for this condition  - reeducation on post-op precautions    Written Home Exercises Provided: Patient instructed to cont prior HEP. Exercises were reviewed and Mike was able to demonstrate them prior to the end of the session.  Mike demonstrated good  understanding of the education provided. See EMR under Patient Instructions for exercises provided during therapy sessions    ASSESSMENT     Patient tolerated today's treatment well. Quad control is improving consistently. Knee range of motion 0-90 without difficulty.    Mike Is progressing well towards his goals.   Pt prognosis is Good.     Pt will continue to benefit from skilled outpatient physical  therapy to address the deficits listed in the problem list box on initial evaluation, provide pt/family education and to maximize pt's level of independence in the home and community environment.     Pt's spiritual, cultural and educational needs considered and pt agreeable to plan of care and goals.     Anticipated barriers to physical therapy: none    Goals:  Short-Term Goals: 6 weeks  - The patient will be independent with initial home exercise program. (Progressing)  - The patient will increase strength to at least 3+/5 to perform functional mobility including gait and sit to stand (Progressing)  - The patient will increase knee ROM grossly to 0-90 degrees to perform ADLs with pain < 5/10. (Progressing)     Long-Term Goals: 12 weeks  - Pt to achieve <25% limitation as measured by the FOTO to demonstrate decreased disability.   - The patient will be independent amb with no assistive device on all surfaces for community distances. (Progressing)   - The patient will increase strength to at least 4+/5 to perform functional mobility including gait, stairs.(Progressing)  - The patient will increase knee ROM grossly to WFL to perform all ADLs with pain < 3/10.(Progressing)    PLAN     Plan of care Certification: 1/25/2023 to 4/25/23.     Outpatient Physical Therapy 2 times weekly for 12 weeks to include the following interventions: Manual Therapy, Moist Heat/ Ice, Neuromuscular Re-ed, Patient Education, Therapeutic Activities, Therapeutic Exercise, Gait Training, Electrical Stimulation PRN, and Dry needling PRN.     This patient had a ligamentous repair in his L knee. I expect he will require 6-9 months of physical therapy to safely return to his prior level of function to allow for restored strength and adequate tissue healing.    Elliott Hartley, PT

## 2023-02-09 ENCOUNTER — CLINICAL SUPPORT (OUTPATIENT)
Dept: REHABILITATION | Facility: HOSPITAL | Age: 33
End: 2023-02-09
Payer: COMMERCIAL

## 2023-02-09 DIAGNOSIS — R29.898 DECREASED STRENGTH OF LOWER EXTREMITY: Primary | ICD-10-CM

## 2023-02-09 DIAGNOSIS — Z74.09 DECREASED FUNCTIONAL MOBILITY AND ENDURANCE: ICD-10-CM

## 2023-02-09 DIAGNOSIS — M25.662 DECREASED RANGE OF MOTION (ROM) OF LEFT KNEE: ICD-10-CM

## 2023-02-09 PROCEDURE — 97112 NEUROMUSCULAR REEDUCATION: CPT | Mod: PN

## 2023-02-09 PROCEDURE — 97110 THERAPEUTIC EXERCISES: CPT | Mod: PN

## 2023-02-09 PROCEDURE — 97140 MANUAL THERAPY 1/> REGIONS: CPT | Mod: PN

## 2023-02-14 ENCOUNTER — CLINICAL SUPPORT (OUTPATIENT)
Dept: REHABILITATION | Facility: HOSPITAL | Age: 33
End: 2023-02-14
Payer: COMMERCIAL

## 2023-02-14 ENCOUNTER — TELEPHONE (OUTPATIENT)
Dept: PAIN MEDICINE | Facility: CLINIC | Age: 33
End: 2023-02-14
Payer: COMMERCIAL

## 2023-02-14 DIAGNOSIS — M25.662 DECREASED RANGE OF MOTION (ROM) OF LEFT KNEE: ICD-10-CM

## 2023-02-14 DIAGNOSIS — R29.898 DECREASED STRENGTH OF LOWER EXTREMITY: Primary | ICD-10-CM

## 2023-02-14 DIAGNOSIS — Z98.890 POST-OPERATIVE STATE: Primary | ICD-10-CM

## 2023-02-14 DIAGNOSIS — G89.18 POSTOPERATIVE PAIN: ICD-10-CM

## 2023-02-14 DIAGNOSIS — Z74.09 DECREASED FUNCTIONAL MOBILITY AND ENDURANCE: ICD-10-CM

## 2023-02-14 PROCEDURE — 97112 NEUROMUSCULAR REEDUCATION: CPT | Mod: PN

## 2023-02-14 PROCEDURE — 97110 THERAPEUTIC EXERCISES: CPT | Mod: PN

## 2023-02-14 PROCEDURE — 97140 MANUAL THERAPY 1/> REGIONS: CPT | Mod: PN

## 2023-02-14 NOTE — PROGRESS NOTES
[facility]   Physical Therapy Treatment Note     Name: Mike Aburto  Clinic Number: [id number]    Therapy Diagnosis:   Encounter Diagnoses   Name Primary?    Decreased strength of lower extremity Yes    Decreased range of motion (ROM) of left knee     Decreased functional mobility and endurance      Physician: Danial Ribera MD    Visit Date: 2/14/2023    Physician Orders: PT Eval and Treat   Medical Diagnosis from Referral: Injury to multiple structures of left knee [S89.92XA]  Evaluation Date: 1/25/2023  Authorization Period Expiration: 1/26/23  Plan of Care Expiration: 4/25/23  Progress Note Due: 2/25/23  Visit # / Visits authorized: 8/ 20   FOTO: Not performed    PTA Visit #: 0/5     Postoperative Plan:  ACL with meniscus repair protocol  Weight bearing:  Nonweightbearing for 6 weeks  Range of motion:  0-90 for 4  Antibiotics: 5 days of PO prophylactic antibiotics  DVT Ppx:  Aspirin daily  PT/OT: Start POD#3  Follow-up: 10-14 days with AP/Lateral of operative knee (non-weightbearing)    Time In: 11:00 AM  Time Out: 12:00 PM  Total Billable Time: 60 minutes    SUBJECTIVE     Pt reports: he did not do much on his own over the weekend. He reports he still has a good bit of knee pain.  He was compliant with home exercise program.  Response to previous treatment: no notable change  Functional change: no notable change    Pain: 7/10  Location: left knee     OBJECTIVE     Objective Measures updated at progress report unless specified.       Treatment     Mike received the treatments listed below:      therapeutic exercises to develop strength, endurance, ROM, flexibility, posture, and core stabilization for 25 minutes including:  - Heel Prop - 7# - 5 minutes              - L gastroc stretch with strap - 2 minutes              - L hamstring stretch seated - 2 minutes   - prone hip extension L - 3x10   - Heel slides to 90 degrees - 10x10s     manual therapy techniques:  for 10  minutes, including:              - knee PROM to improve flexion and extension range of motion   - patella mobilizations     neuromuscular re-education activities to improve: Balance, Coordination, Kinesthetic, Sense, Proprioception, and Posture for 25 minutes. The following activities were included:   - Quad set with towel with Russian - 5 minutes   - SAQ with Martiniquais - 5 minutes   - LAQ in seated with Martiniquais - 5 minutes   - LAQ in Supine with Martiniquais - 5 minutes   - SLR with [place]iqu[place] - 5 minutes   - Prone Quad Sets - 3x10   - SL Hip Abduction B - 3x10      therapeutic activities to improve functional performance for 0  minutes, including:              - none performed today     gait training to improve functional mobility and safety for 0  minutes, including:              - none performed today     supervised modalities after being cleared for contradictions: [place] Electrical Stimulation: Mike received Russian Electrical Stimulation for pain control applied to the knee. Mike tolderated treatment well without any adverse effects.        Patient Education and Home Exercises     Home Exercises Provided and Patient Education Provided     Education provided:   - importance of consistency with HEP  - role of physical therapy for this condition  - reeducation on post-op precautions    Written Home Exercises Provided: Patient instructed to cont prior HEP. Exercises were reviewed and Mkie was able to demonstrate them prior to the end of the session.  Mike demonstrated good  understanding of the education provided. See EMR under Patient Instructions for exercises provided during therapy sessions    ASSESSMENT     Patient tolerated today's treatment well. Improved quad control at terminal extension noted. He will benefit from continued care.    Mike Is progressing well towards his goals.   Pt prognosis is Good.     Pt will continue to benefit from skilled outpatient physical therapy to address the deficits listed  in the problem list box on initial evaluation, provide pt/family education and to maximize pt's level of independence in the home and community environment.     Pt's spiritual, cultural and educational needs considered and pt agreeable to plan of care and goals.     Anticipated barriers to physical therapy: none    Goals:  Short-Term Goals: 6 weeks  - The patient will be independent with initial home exercise program. (Progressing)  - The patient will increase strength to at least 3+/5 to perform functional mobility including gait and sit to stand (Progressing)  - The patient will increase knee ROM grossly to 0-90 degrees to perform ADLs with pain < 5/10. (Progressing)     Long-Term Goals: 12 weeks  - Pt to achieve <25% limitation as measured by the FOTO to demonstrate decreased disability.   - The patient will be independent amb with no assistive device on all surfaces for community distances. (Progressing)   - The patient will increase strength to at least 4+/5 to perform functional mobility including gait, stairs.(Progressing)  - The patient will increase knee ROM grossly to WFL to perform all ADLs with pain < 3/10.(Progressing)    PLAN     Plan of care Certification: 1/25/2023 to 4/25/23.     Outpatient Physical Therapy 2 times weekly for 12 weeks to include the following interventions: Manual Therapy, Moist Heat/ Ice, Neuromuscular Re-ed, Patient Education, Therapeutic Activities, Therapeutic Exercise, Gait Training, Electrical Stimulation PRN, and Dry needling PRN.     This patient had a ligamentous repair in his L knee. I expect he will require 6-9 months of physical therapy to safely return to his prior level of function to allow for restored strength and adequate tissue healing.    Elliott Hartley, PT

## 2023-02-14 NOTE — TELEPHONE ENCOUNTER
[facility] providers offer interventional procedure such as epidural, nerve blocks, nerve burns to treat chronic pain. You will be presented with a multi-modal treatment plan that may or may not include imaging, procedures, therapy,  pain creams, non-narcotic pain medications used for treatment of chronic pain.There is a limited role for opioids and we do not manage opoid medications long term.      Pt will call Lori for referral to outside pain management to manage post-op pain that we do not manage pain medication. Pt is not interested in interventions.

## 2023-02-15 ENCOUNTER — CLINICAL SUPPORT (OUTPATIENT)
Dept: REHABILITATION | Facility: HOSPITAL | Age: 33
End: 2023-02-15
Payer: COMMERCIAL

## 2023-02-15 DIAGNOSIS — M25.662 DECREASED RANGE OF MOTION (ROM) OF LEFT KNEE: ICD-10-CM

## 2023-02-15 DIAGNOSIS — Z74.09 DECREASED FUNCTIONAL MOBILITY AND ENDURANCE: ICD-10-CM

## 2023-02-15 DIAGNOSIS — R29.898 DECREASED STRENGTH OF LOWER EXTREMITY: Primary | ICD-10-CM

## 2023-02-15 PROCEDURE — 97140 MANUAL THERAPY 1/> REGIONS: CPT | Mod: PN

## 2023-02-15 PROCEDURE — 97112 NEUROMUSCULAR REEDUCATION: CPT | Mod: PN

## 2023-02-15 PROCEDURE — 97110 THERAPEUTIC EXERCISES: CPT | Mod: PN

## 2023-02-15 NOTE — PROGRESS NOTES
[facility]   Physical Therapy Treatment Note     Name: Mike Aburto  Clinic Number: [id number]    Therapy Diagnosis:   Encounter Diagnoses   Name Primary?    Decreased strength of lower extremity Yes    Decreased range of motion (ROM) of left knee     Decreased functional mobility and endurance        Physician: Danial Ribera MD    Visit Date: 2/15/2023    Physician Orders: PT Eval and Treat   Medical Diagnosis from Referral: Injury to multiple structures of left knee [S89.92XA]  Evaluation Date: 1/25/2023  Authorization Period Expiration: 1/26/23  Plan of Care Expiration: 4/25/23  Progress Note Due: 2/25/23  Visit # / Visits authorized: 9/ 20   FOTO: Not performed    PTA Visit #: 0/5     Postoperative Plan:  ACL with meniscus repair protocol  Weight bearing:  Nonweightbearing for 6 weeks  Range of motion:  0-90 for 4  Antibiotics: 5 days of PO prophylactic antibiotics  DVT Ppx:  Aspirin daily  PT/OT: Start POD#3  Follow-up: 10-14 days with AP/Lateral of operative knee (non-weightbearing)    Time In: 1:40 AM  Time Out: 2:50 PM  Total Billable Time: 70 minutes    SUBJECTIVE     Pt reports: he is doing well today. He reports his knee pain is improving but that he still has a good bit of pain overall.  He was compliant with home exercise program.  Response to previous treatment: no notable change  Functional change: no notable change    Pain: 7/10  Location: left knee     OBJECTIVE     Objective Measures updated at progress report unless specified.       Treatment     Mike received the treatments listed below:      therapeutic exercises to develop strength, endurance, ROM, flexibility, posture, and core stabilization for 25 minutes including:  - Heel Prop - 7# - 5 minutes              - L gastroc stretch with strap - 2 minutes              - L hamstring stretch seated - 2 minutes   - prone hip extension L - 3x10   - Heel slides to 90 degrees - 10x10s     manual therapy  techniques:  for 15 minutes, including:              - knee PROM to improve flexion and extension range of motion   - patella mobilizations     neuromuscular re-education activities to improve: Balance, Coordination, Kinesthetic, Sense, Proprioception, and Posture for 30 minutes. The following activities were included:   - Quad set with towel with Russian - 5 minutes   - SAQ with Cym[place] - 5 minutes   - LAQ in seated with [place]ym[place] - 5 minutes   - LAQ in Supine with [place]ym[place] - 5 minutes   - SLR with [place]ym[place] - 5 minutes   - Prone Quad Sets - 3x10   - SL Hip Abduction B - 3x10      therapeutic activities to improve functional performance for 0  minutes, including:              - none performed today     gait training to improve functional mobility and safety for 0  minutes, including:              - none performed today     supervised modalities after being cleared for contradictions: [place]ym[place] Electrical Stimulation: Mike received Russian Electrical Stimulation for pain control applied to the knee. Mike tolderated treatment well without any adverse effects.        Patient Education and Home Exercises     Home Exercises Provided and Patient Education Provided     Education provided:   - importance of consistency with HEP  - role of physical therapy for this condition  - reeducation on post-op precautions    Written Home Exercises Provided: Patient instructed to cont prior HEP. Exercises were reviewed and Mike was able to demonstrate them prior to the end of the session.  Mike demonstrated good  understanding of the education provided. See EMR under Patient Instructions for exercises provided during therapy sessions    ASSESSMENT     Patient quad control continues to improve. Appropriate knee range of motion (0-90) given current post-op restrictions. He will need continued physical therapy care.    Mike Is progressing well towards his goals.   Pt prognosis is Good.     Pt will continue to benefit from skilled  outpatient physical therapy to address the deficits listed in the problem list box on initial evaluation, provide pt/family education and to maximize pt's level of independence in the home and community environment.     Pt's spiritual, cultural and educational needs considered and pt agreeable to plan of care and goals.     Anticipated barriers to physical therapy: none    Goals:  Short-Term Goals: 6 weeks  - The patient will be independent with initial home exercise program. (Progressing)  - The patient will increase strength to at least 3+/5 to perform functional mobility including gait and sit to stand (Progressing)  - The patient will increase knee ROM grossly to 0-90 degrees to perform ADLs with pain < 5/10. (Progressing)     Long-Term Goals: 12 weeks  - Pt to achieve <25% limitation as measured by the FOTO to demonstrate decreased disability.   - The patient will be independent amb with no assistive device on all surfaces for community distances. (Progressing)   - The patient will increase strength to at least 4+/5 to perform functional mobility including gait, stairs.(Progressing)  - The patient will increase knee ROM grossly to WFL to perform all ADLs with pain < 3/10.(Progressing)    PLAN     Plan of care Certification: 1/25/2023 to 4/25/23.     Outpatient Physical Therapy 2 times weekly for 12 weeks to include the following interventions: Manual Therapy, Moist Heat/ Ice, Neuromuscular Re-ed, Patient Education, Therapeutic Activities, Therapeutic Exercise, Gait Training, Electrical Stimulation PRN, and Dry needling PRN.     This patient had a ligamentous repair in his L knee. I expect he will require 6-9 months of physical therapy to safely return to his prior level of function to allow for restored strength and adequate tissue healing.    Elliott Hartley, PT

## 2023-02-16 NOTE — PROGRESS NOTES
[facility]   Physical Therapy Treatment Note     Name: Mike Aburto  Clinic Number: [id number]    Therapy Diagnosis:   Encounter Diagnoses   Name Primary?    Decreased strength of lower extremity Yes    Decreased range of motion (ROM) of left knee     Decreased functional mobility and endurance      Physician: Danial Ribera MD    Visit Date: 2/17/2023    Physician Orders: PT Eval and Treat   Medical Diagnosis from Referral: Injury to multiple structures of left knee [S89.92XA]  Evaluation Date: 1/25/2023  Authorization Period Expiration: 1/26/23  Plan of Care Expiration: 4/25/23  Progress Note Due: 2/25/23  Visit # / Visits authorized: 10/ 20   FOTO: Not performed    PTA Visit #: 0/5     Postoperative Plan:  ACL with meniscus repair protocol  Weight bearing:  Nonweightbearing for 6 weeks  Range of motion:  0-90 for 4  Antibiotics: 5 days of PO prophylactic antibiotics  DVT Ppx:  Aspirin daily  PT/OT: Start POD#3  Follow-up: 10-14 days with AP/Lateral of operative knee (non-weightbearing)    Time In: 11:30 AM  Time Out: 12:45 PM  Total Billable Time: 75 minutes    SUBJECTIVE     Pt reports: he is still having knee pain. He reports he has ashanti having pain that feels like it is behind his patellar tendon. He reports he has been somewhat complaint with his HEP.  He was compliant with home exercise program.  Response to previous treatment: no notable change  Functional change: no notable change    Pain: 7/10  Location: left knee     OBJECTIVE     Objective Measures updated at progress report unless specified.     Treatment     Mike received the treatments listed below:      therapeutic exercises to develop strength, endurance, ROM, flexibility, posture, and core stabilization for 25 minutes including:  - Heel Prop - 7# - 5 minutes              - L gastroc stretch with strap - 2 minutes              - L hamstring stretch seated - 2 minutes   - prone hip extension L - 3x10   - Heel  slides to 90 degrees - 10x10s     manual therapy techniques:  for 15 minutes, including:              - knee PROM to improve flexion and extension range of motion   - patella mobilizations     neuromuscular re-education activities to improve: Balance, Coordination, Kinesthetic, Sense, Proprioception, and Posture for 35 minutes. The following activities were included:   - Quad set with towel with Russian - 5 minutes   - SAQ with Singaporean - 5 minutes   - LAQ in seated with Singaporean - 5 minutes   - LAQ in Supine with Singaporean - 5 minutes   - SLR with Singaporean - 5 minutes   - Prone Quad Sets - 3x10   - SL Hip Abduction B - 3x10      therapeutic activities to improve functional performance for 0  minutes, including:              - none performed today     gait training to improve functional mobility and safety for 0  minutes, including:              - none performed today     supervised modalities after being cleared for contradictions: [place] Electrical Stimulation: Mike received Russian Electrical Stimulation for pain control applied to the knee. Mike tolderated treatment well without any adverse effects.        Patient Education and Home Exercises     Home Exercises Provided and Patient Education Provided     Education provided:   - importance of consistency with HEP  - role of physical therapy for this condition  - reeducation on post-op precautions    Written Home Exercises Provided: Patient instructed to cont prior HEP. Exercises were reviewed and Mike was able to demonstrate them prior to the end of the session.  Mike demonstrated good  understanding of the education provided. See EMR under Patient Instructions for exercises provided during therapy sessions    ASSESSMENT     Mike demonstrates good progress with his knee range of motion and quad control. His function is progressing appropriately given his post-op status.    Mike Is progressing well towards his goals.   Pt prognosis is Good.     Pt will  continue to benefit from skilled outpatient physical therapy to address the deficits listed in the problem list box on initial evaluation, provide pt/family education and to maximize pt's level of independence in the home and community environment.     Pt's spiritual, cultural and educational needs considered and pt agreeable to plan of care and goals.     Anticipated barriers to physical therapy: none    Goals:  Short-Term Goals: 6 weeks  - The patient will be independent with initial home exercise program. (Progressing)  - The patient will increase strength to at least 3+/5 to perform functional mobility including gait and sit to stand (Progressing)  - The patient will increase knee ROM grossly to 0-90 degrees to perform ADLs with pain < 5/10. (Progressing)     Long-Term Goals: 12 weeks  - Pt to achieve <25% limitation as measured by the FOTO to demonstrate decreased disability.   - The patient will be independent amb with no assistive device on all surfaces for community distances. (Progressing)   - The patient will increase strength to at least 4+/5 to perform functional mobility including gait, stairs.(Progressing)  - The patient will increase knee ROM grossly to WFL to perform all ADLs with pain < 3/10.(Progressing)    PLAN     Plan of care Certification: 1/25/2023 to 4/25/23.     Outpatient Physical Therapy 2 times weekly for 12 weeks to include the following interventions: Manual Therapy, Moist Heat/ Ice, Neuromuscular Re-ed, Patient Education, Therapeutic Activities, Therapeutic Exercise, Gait Training, Electrical Stimulation PRN, and Dry needling PRN.     This patient had a ligamentous repair in his L knee. I expect he will require 6-9 months of physical therapy to safely return to his prior level of function to allow for restored strength and adequate tissue healing.    Elliott Hartley, PT

## 2023-02-17 ENCOUNTER — CLINICAL SUPPORT (OUTPATIENT)
Dept: REHABILITATION | Facility: HOSPITAL | Age: 33
End: 2023-02-17
Payer: COMMERCIAL

## 2023-02-17 DIAGNOSIS — R29.898 DECREASED STRENGTH OF LOWER EXTREMITY: Primary | ICD-10-CM

## 2023-02-17 DIAGNOSIS — Z74.09 DECREASED FUNCTIONAL MOBILITY AND ENDURANCE: ICD-10-CM

## 2023-02-17 DIAGNOSIS — M25.662 DECREASED RANGE OF MOTION (ROM) OF LEFT KNEE: ICD-10-CM

## 2023-02-17 PROCEDURE — 97110 THERAPEUTIC EXERCISES: CPT | Mod: PN

## 2023-02-17 PROCEDURE — 97112 NEUROMUSCULAR REEDUCATION: CPT | Mod: PN

## 2023-02-17 PROCEDURE — 97140 MANUAL THERAPY 1/> REGIONS: CPT | Mod: PN

## 2023-02-22 ENCOUNTER — CLINICAL SUPPORT (OUTPATIENT)
Dept: REHABILITATION | Facility: HOSPITAL | Age: 33
End: 2023-02-22
Payer: COMMERCIAL

## 2023-02-22 DIAGNOSIS — Z74.09 DECREASED FUNCTIONAL MOBILITY AND ENDURANCE: ICD-10-CM

## 2023-02-22 DIAGNOSIS — R29.898 DECREASED STRENGTH OF LOWER EXTREMITY: Primary | ICD-10-CM

## 2023-02-22 DIAGNOSIS — M25.662 DECREASED RANGE OF MOTION (ROM) OF LEFT KNEE: ICD-10-CM

## 2023-02-22 PROCEDURE — 97110 THERAPEUTIC EXERCISES: CPT | Mod: PN

## 2023-02-22 PROCEDURE — 97014 ELECTRIC STIMULATION THERAPY: CPT | Mod: PN

## 2023-02-22 PROCEDURE — 97112 NEUROMUSCULAR REEDUCATION: CPT | Mod: PN

## 2023-02-22 PROCEDURE — 97140 MANUAL THERAPY 1/> REGIONS: CPT | Mod: PN

## 2023-02-22 NOTE — PROGRESS NOTES
[facility]   Physical Therapy Treatment Note     Name: Mike Aburto  Clinic Number: [id number]    Therapy Diagnosis:   Encounter Diagnoses   Name Primary?    Decreased strength of lower extremity Yes    Decreased range of motion (ROM) of left knee     Decreased functional mobility and endurance        Physician: Danial Ribera MD    Visit Date: 2/22/2023    Physician Orders: PT Eval and Treat   Medical Diagnosis from Referral: Injury to multiple structures of left knee [S89.92XA]  Evaluation Date: 1/25/2023  Authorization Period Expiration: 1/26/23  Plan of Care Expiration: 4/25/23  Progress Note Due: 2/25/23  Visit # / Visits authorized: 11/ 20   FOTO: Not performed    PTA Visit #: 0/5     Postoperative Plan:  ACL with meniscus repair protocol  Weight bearing:  Nonweightbearing for 6 weeks  Range of motion:  0-90 for 4  Antibiotics: 5 days of PO prophylactic antibiotics  DVT Ppx:  Aspirin daily  PT/OT: Start POD#3  Follow-up: 10-14 days with AP/Lateral of operative knee (non-weightbearing)    Time In: 1:20 PM  Time Out: 2:30 PM  Total Billable Time: 75 minutes    SUBJECTIVE     Pt reports: he is doing well today. He reports he is still having pain but that it is better overall.  He was compliant with home exercise program.  Response to previous treatment: no notable change  Functional change: no notable change    Pain: 7/10  Location: left knee     OBJECTIVE     Flexion range of motion without pain to 120 degrees today.    Treatment     Mike received the treatments listed below:      therapeutic exercises to develop strength, endurance, ROM, flexibility, posture, and core stabilization for 25 minutes including:  - Heel Prop - 7# - 5 minutes              - L gastroc stretch with strap - 2 minutes              - L hamstring stretch seated - 2 minutes   - prone hip extension L - 3x10   - Heel slides to 90 degrees - 10x10s     manual therapy techniques:  for 15 minutes,  including:              - knee PROM to improve flexion and extension range of motion   - patella mobilizations     neuromuscular re-education activities to improve: Balance, Coordination, Kinesthetic, Sense, Proprioception, and Posture for 35 minutes. The following activities were included:   - Quad set with towel with Russian - 5 minutes   - SAQ with [place] - 5 minutes   - LAQ in seated with [place] - 5 minutes   - LAQ in Supine with [place] - 5 minutes   - SLR with [place] - 5 minutes   - Prone Quad Sets - 3x10   - SL Hip Abduction B - 3x10      therapeutic activities to improve functional performance for 0  minutes, including:              - none performed today     gait training to improve functional mobility and safety for 0  minutes, including:              - none performed today     supervised modalities after being cleared for contradictions: [place] Electrical Stimulation: Mike received Russian Electrical Stimulation for pain control applied to the knee. Mike tolderated treatment well without any adverse effects.        Patient Education and Home Exercises     Home Exercises Provided and Patient Education Provided     Education provided:   - importance of consistency with HEP  - role of physical therapy for this condition  - reeducation on post-op precautions    Written Home Exercises Provided: Patient instructed to cont prior HEP. Exercises were reviewed and Mike was able to demonstrate them prior to the end of the session.  Mike demonstrated good  understanding of the education provided. See EMR under Patient Instructions for exercises provided during therapy sessions    ASSESSMENT     Patient tolerated today's treatment well. He continues to demonstrate improvement in quad strength and control. He will benefit from continued physical therapy care.    Mike Is progressing well towards his goals.   Pt prognosis is Good.     Pt will continue to benefit from skilled outpatient physical therapy to  address the deficits listed in the problem list box on initial evaluation, provide pt/family education and to maximize pt's level of independence in the home and community environment.     Pt's spiritual, cultural and educational needs considered and pt agreeable to plan of care and goals.     Anticipated barriers to physical therapy: none    Goals:  Short-Term Goals: 6 weeks  - The patient will be independent with initial home exercise program. (Progressing)  - The patient will increase strength to at least 3+/5 to perform functional mobility including gait and sit to stand (Progressing)  - The patient will increase knee ROM grossly to 0-90 degrees to perform ADLs with pain < 5/10. (Progressing)     Long-Term Goals: 12 weeks  - Pt to achieve <25% limitation as measured by the FOTO to demonstrate decreased disability.   - The patient will be independent amb with no assistive device on all surfaces for community distances. (Progressing)   - The patient will increase strength to at least 4+/5 to perform functional mobility including gait, stairs.(Progressing)  - The patient will increase knee ROM grossly to WFL to perform all ADLs with pain < 3/10.(Progressing)    PLAN     Plan of care Certification: 1/25/2023 to 4/25/23.     Outpatient Physical Therapy 2 times weekly for 12 weeks to include the following interventions: Manual Therapy, Moist Heat/ Ice, Neuromuscular Re-ed, Patient Education, Therapeutic Activities, Therapeutic Exercise, Gait Training, Electrical Stimulation PRN, and Dry needling PRN.     This patient had a ligamentous repair in his L knee. I expect he will require 6-9 months of physical therapy to safely return to his prior level of function to allow for restored strength and adequate tissue healing.    Elliott Hartley, PT

## 2023-02-23 ENCOUNTER — CLINICAL SUPPORT (OUTPATIENT)
Dept: REHABILITATION | Facility: HOSPITAL | Age: 33
End: 2023-02-23
Payer: COMMERCIAL

## 2023-02-23 ENCOUNTER — OFFICE VISIT (OUTPATIENT)
Dept: ORTHOPEDICS | Facility: CLINIC | Age: 33
End: 2023-02-23
Payer: COMMERCIAL

## 2023-02-23 VITALS — WEIGHT: 231.06 LBS | BODY MASS INDEX: 37.14 KG/M2 | HEIGHT: 66 IN

## 2023-02-23 DIAGNOSIS — Z98.890 S/P ACL RECONSTRUCTION: ICD-10-CM

## 2023-02-23 DIAGNOSIS — Z98.890 POST-OPERATIVE STATE: Primary | ICD-10-CM

## 2023-02-23 DIAGNOSIS — S83.242D TEAR OF MEDIAL MENISCUS OF LEFT KNEE, CURRENT, UNSPECIFIED TEAR TYPE, SUBSEQUENT ENCOUNTER: ICD-10-CM

## 2023-02-23 DIAGNOSIS — M23.52 CHRONIC INSTABILITY OF LEFT KNEE: ICD-10-CM

## 2023-02-23 DIAGNOSIS — S83.512A CHRONIC RUPTURE OF ACL OF LEFT KNEE: ICD-10-CM

## 2023-02-23 DIAGNOSIS — S83.282D TEAR OF LATERAL MENISCUS OF LEFT KNEE, CURRENT, UNSPECIFIED TEAR TYPE, SUBSEQUENT ENCOUNTER: ICD-10-CM

## 2023-02-23 DIAGNOSIS — R29.898 DECREASED STRENGTH OF LOWER EXTREMITY: Primary | ICD-10-CM

## 2023-02-23 DIAGNOSIS — Z74.09 DECREASED FUNCTIONAL MOBILITY AND ENDURANCE: ICD-10-CM

## 2023-02-23 DIAGNOSIS — M25.662 DECREASED RANGE OF MOTION (ROM) OF LEFT KNEE: ICD-10-CM

## 2023-02-23 PROCEDURE — 3008F PR BODY MASS INDEX (BMI) DOCUMENTED: ICD-10-PCS | Mod: CPTII,S$GLB,, | Performed by: PHYSICIAN ASSISTANT

## 2023-02-23 PROCEDURE — 1160F PR REVIEW ALL MEDS BY PRESCRIBER/CLIN PHARMACIST DOCUMENTED: ICD-10-PCS | Mod: CPTII,S$GLB,, | Performed by: PHYSICIAN ASSISTANT

## 2023-02-23 PROCEDURE — 1159F PR MEDICATION LIST DOCUMENTED IN MEDICAL RECORD: ICD-10-PCS | Mod: CPTII,S$GLB,, | Performed by: PHYSICIAN ASSISTANT

## 2023-02-23 PROCEDURE — 1159F MED LIST DOCD IN RCRD: CPT | Mod: CPTII,S$GLB,, | Performed by: PHYSICIAN ASSISTANT

## 2023-02-23 PROCEDURE — 97112 NEUROMUSCULAR REEDUCATION: CPT | Mod: PN

## 2023-02-23 PROCEDURE — 99024 PR POST-OP FOLLOW-UP VISIT: ICD-10-PCS | Mod: S$GLB,,, | Performed by: PHYSICIAN ASSISTANT

## 2023-02-23 PROCEDURE — 99999 PR PBB SHADOW E&M-EST. PATIENT-LVL III: ICD-10-PCS | Mod: PBBFAC,,, | Performed by: PHYSICIAN ASSISTANT

## 2023-02-23 PROCEDURE — 99999 PR PBB SHADOW E&M-EST. PATIENT-LVL III: CPT | Mod: PBBFAC,,, | Performed by: PHYSICIAN ASSISTANT

## 2023-02-23 PROCEDURE — 3008F BODY MASS INDEX DOCD: CPT | Mod: CPTII,S$GLB,, | Performed by: PHYSICIAN ASSISTANT

## 2023-02-23 PROCEDURE — 99024 POSTOP FOLLOW-UP VISIT: CPT | Mod: S$GLB,,, | Performed by: PHYSICIAN ASSISTANT

## 2023-02-23 PROCEDURE — 1160F RVW MEDS BY RX/DR IN RCRD: CPT | Mod: CPTII,S$GLB,, | Performed by: PHYSICIAN ASSISTANT

## 2023-02-23 NOTE — PATIENT INSTRUCTIONS
Assessment:  Mike Aburto is a  32 y.o. male    with a chief complaint of Post-op Evaluation of the Left Knee  4 weeks status post L Knee ACL recon w/ quad auto, MMR, LMR, bone spur excision on 1/24/23.  Post-op    Encounter Diagnoses   Name Primary?    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  Nonweightbearing x2 more weeks- for total of 6 weeks after surgery  Patient requested a referral to Pain Management- referral sent to [facility] patient given phone number today to reach out  Follow-up in 2 weeks  **WILL MEASURE FOR FUNCTIONAL ACL BRACE AT NEXT VISIT**    Follow-up: 2 weeks or sooner if there are any problems between now and then.    Leave Review:   Google: Leave Google Review  Healthgrades: Leave Healthgrades Review    After Hours Number: [phone]

## 2023-02-23 NOTE — PROGRESS NOTES
[facility]   Physical Therapy Treatment Note     Name: Mike Aburto  Clinic Number: [id number]    Therapy Diagnosis:   No diagnosis found.      Physician: Danial Ribera MD    Visit Date: 2/23/2023    Physician Orders: PT Eval and Treat   Medical Diagnosis from Referral: Injury to multiple structures of left knee [S89.92XA]  Evaluation Date: 1/25/2023  Authorization Period Expiration: 1/26/23  Plan of Care Expiration: 4/25/23  Progress Note Due: 2/25/23  Visit # / Visits authorized: 12/12 (1/1 eval)  FOTO: Not performed    PTA Visit #: 0/5     Postoperative Plan:  ACL with meniscus repair protocol  Weight bearing:  Nonweightbearing for 6 weeks  Range of motion:  0-90 for 4  Antibiotics: 5 days of PO prophylactic antibiotics  DVT Ppx:  Aspirin daily  PT/OT: Start POD#3  Follow-up: 10-14 days with AP/Lateral of operative knee (non-weightbearing)    Time In: 1:30 PM  Time Out: 2:05 PM  Total Billable Time: 35 minutes    SUBJECTIVE     Pt reports: he is still having some knee pain but that he is doing better overall. He reports he needs to be out quickly today to get to a MD appointment.  He was compliant with home exercise program.  Response to previous treatment: no notable change  Functional change: no notable change    Pain: 7/10  Location: left knee     OBJECTIVE     Full extension range of motion    Pain-free 120 degrees flexion    Treatment     Mike received the treatments listed below:       neuromuscular re-education activities to improve: Balance, Coordination, Kinesthetic, Sense, Proprioception, and Posture for 30 minutes. The following activities were included:   - Quad set with towel with Russian - 5 minutes   - SAQ with [place] - 5 minutes   - LAQ in seated with [place] - 5 minutes   - LAQ in Supine with [place] - 5 minutes   - SLR with [place] - 5 minutes      therapeutic activities to improve functional performance for 0  minutes, including:              - none  performed today     gait training to improve functional mobility and safety for 0  minutes, including:              - none performed today     supervised modalities after being cleared for contradictions: Citizen of [place] Electrical Stimulation: Mike received Russian Electrical Stimulation for pain control applied to the knee. Mike tolderated treatment well without any adverse effects.        Patient Education and Home Exercises     Home Exercises Provided and Patient Education Provided     Education provided:   - importance of consistency with HEP  - role of physical therapy for this condition  - reeducation on post-op precautions    Written Home Exercises Provided: Patient instructed to cont prior HEP. Exercises were reviewed and Mike was able to demonstrate them prior to the end of the session.  Mike demonstrated good  understanding of the education provided. See EMR under Patient Instructions for exercises provided during therapy sessions    ASSESSMENT     Patient exercise program adjusted today secondary to patient time constraints. He is making steady progress with his range of motion, quad strength, and overall function. On track to begin weight-bearing at 6 weeks post-op time.    Mike Is progressing well towards his goals.   Pt prognosis is Good.     Pt will continue to benefit from skilled outpatient physical therapy to address the deficits listed in the problem list box on initial evaluation, provide pt/family education and to maximize pt's level of independence in the home and community environment.     Pt's spiritual, cultural and educational needs considered and pt agreeable to plan of care and goals.     Anticipated barriers to physical therapy: none    Goals:  Short-Term Goals: 6 weeks  - The patient will be independent with initial home exercise program. (Progressing)  - The patient will increase strength to at least 3+/5 to perform functional mobility including gait and sit to stand  (Progressing)  - The patient will increase knee ROM grossly to 0-90 degrees to perform ADLs with pain < 5/10. (Progressing)     Long-Term Goals: 12 weeks  - Pt to achieve <25% limitation as measured by the FOTO to demonstrate decreased disability.   - The patient will be independent amb with no assistive device on all surfaces for community distances. (Progressing)   - The patient will increase strength to at least 4+/5 to perform functional mobility including gait, stairs.(Progressing)  - The patient will increase knee ROM grossly to WFL to perform all ADLs with pain < 3/10.(Progressing)    PLAN     Plan of care Certification: 1/25/2023 to 4/25/23.     Outpatient Physical Therapy 2 times weekly for 12 weeks to include the following interventions: Manual Therapy, Moist Heat/ Ice, Neuromuscular Re-ed, Patient Education, Therapeutic Activities, Therapeutic Exercise, Gait Training, Electrical Stimulation PRN, and Dry needling PRN.     This patient had a ligamentous repair in his L knee. I expect he will require 6-9 months of physical therapy to safely return to his prior level of function to allow for restored strength and adequate tissue healing.    Elliott Hartley, PT

## 2023-02-23 NOTE — PROGRESS NOTES
Patient ID: Mike Aburto  YOB: 1990  MRN: [id number]    Chief Complaint: Post-op Evaluation of the Left Knee      Referred By: Dr. Bonilla    History of Present Illness: Mike Aburto is a  32 y.o. male    with a chief complaint of Post-op Evaluation of the Left Knee      Mike Aburto presents today 4 weeks status post L Knee ACL recon w/ quad auto, MMR, LMR, bone spur excision on 1/24/23. He presents NWB with brace/assistive devices. The patient has continued physical therapy at [facility]. Overall there are no issues or concerns.     Past Medical History:   Past Medical History:   Diagnosis Date    Allergy     Seasonal allergies      Past Surgical History:   Procedure Laterality Date    ARTHROSCOPY,KNEE,WITH MENISCUS REPAIR Left 1/24/2023    Procedure: ARTHROSCOPY,KNEE,WITH MENISCUS REPAIR;  Surgeon: Danial Ribera MD;  Location: [facility];  Service: Orthopedics;  Laterality: Left;  medial and lateral repair    FRACTURE SURGERY      left lower arm    HAND SURGERY Right 2011    RECONSTRUCTION OF ANTERIOR CRUCIATE LIGAMENT USING GRAFT Left 1/24/2023    Procedure: RECONSTRUCTION, KNEE, ACL, USING GRAFT;  Surgeon: Danial Ribera MD;  Location: [facility];  Service: Orthopedics;  Laterality: Left;  Left knee arthroscopy, anterior cruciate ligament reconstruction with quadriceps tendon autograft    TONSILLECTOMY       Family History   Problem Relation Age of Onset    No Known Problems Mother     No Known Problems Father     Cancer Maternal Uncle     Cancer Maternal Grandfather     Diabetes Paternal Grandmother      Social History     Socioeconomic History    Marital status: Single   Tobacco Use    Smoking status: Every Day     Packs/day: 0.50     Types: Cigarettes    Smokeless tobacco: Never   Substance and Sexual Activity    Alcohol use: Yes     Comment: occassionally    Drug use: Never    Sexual activity: Yes     Partners: Female     Medication List with Changes/Refills   Current  Medications    ASPIRIN (ECOTRIN) 325 MG EC TABLET    Take 1 tablet (325 mg total) by mouth once daily. Complete full 6 weeks    DEXTROAMPHETAMINE-AMPHETAMINE 10 MG TAB    Take 1 tablet (10 mg total) by mouth once daily.    DOCUSATE SODIUM (COLACE) 100 MG CAPSULE    Take 1 capsule (100 mg total) by mouth 2 (two) times daily.    ERGOCALCIFEROL (ERGOCALCIFEROL) 50,000 UNIT CAP    Take by mouth.    GABAPENTIN (NEURONTIN) 600 MG TABLET    Take 1 tablet (600 mg total) by mouth 3 (three) times daily.    ONDANSETRON (ZOFRAN) 4 MG TABLET    Take 1 tablet (4 mg total) by mouth every 8 (eight) hours as needed for Nausea.    OXYCODONE-ACETAMINOPHEN (PERCOCET) 5-325 MG PER TABLET    Take 1 tablet by mouth every 4 to 6 hours as needed for Pain.    PHENTERMINE (ADIPEX-P) 37.5 MG TABLET    Take 37.5 mg by mouth.     Review of patient's allergies indicates:  No Known Allergies  Review of Systems   Constitutional: Negative for chills and fever.   HENT:  Negative for sore throat.    Eyes:  Negative for pain.   Cardiovascular:  Negative for chest pain and leg swelling.   Respiratory:  Negative for cough and shortness of breath.    Skin:  Negative for itching and rash.   Musculoskeletal:  Positive for joint pain, muscle weakness, myalgias and stiffness.   Gastrointestinal:  Negative for abdominal pain, nausea and vomiting.   Genitourinary:  Negative for dysuria.   Neurological:  Negative for dizziness, numbness and paresthesias.     Physical Exam:   Body mass index is 37.29 kg/m².  There were no vitals filed for this visit.   GENERAL: Well appearing, appropriate for stated age, no acute distress.  CARDIOVASCULAR: Pulses regular by peripheral palpation.  PULMONARY: Respirations are even and non-labored.  NEURO: Awake, alert, and oriented x 3.  PSYCH: Mood & affect are appropriate.  HEENT: Head is normocephalic and atraumatic.    Ortho/SPM Exam  Left Knee Exam  Incision sites clean dry and intact, no signs of infection  Minimal effusion    Knee ROM full extension to 120 degrees flexion   All compartments are soft and compressible. Calf soft non-tender. Intact EHL, FHL, gastrocsoleus, and tibialis anterior. Sensation intact to light touch in superficial peroneal, deep peroneal, tibial, sural, and saphenous nerve distributions. Foot warm and well perfused with capillary refill of less than 2 seconds and palpable pedal pulses.       Imaging:   XR Results:  Results for orders placed during the hospital encounter of 02/06/23    X-Ray Knee 1 or 2 View Left    Narrative  EXAMINATION:  Left knee two views    CLINICAL HISTORY:  Knee pain post ACL construction    COMPARISON:  None    FINDINGS:  Bones are intact without fracture or dislocation.  Surgical changes consistent with ACL repair.  Joint fluid suspected..    Impression  Positive joint effusion and surgical changes consistent with ACL repair.      Electronically signed by: Alfred Panda MD  Date:    02/06/2023  Time:    13:50            Relevant imaging results reviewed and interpreted by me, discussed with the patient and / or family today.      Patient Instructions   Assessment:  Mike Aburto is a  32 y.o. male    with a chief complaint of Post-op Evaluation of the Left Knee  4 weeks status post L Knee ACL recon w/ quad auto, MMR, LMR, bone spur excision on 1/24/23.  Post-op    Encounter Diagnoses   Name Primary?    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  Nonweightbearing x2 more weeks- for total of 6 weeks after surgery  Patient requested a referral to Pain Management- referral sent to  [facility] patient given phone number today to reach out  Follow-up in 2 weeks  **WILL MEASURE FOR FUNCTIONAL ACL BRACE AT NEXT VISIT**    Follow-up: 2 weeks or sooner if there are any problems between now and then.    Leave Review:   Google: Leave Google Review  Healthgrades: Leave Healthgrades Review    After Hours Number: [phone]      Provider Note/Medical Decision Making:     I discussed worrisome and red flag signs and symptoms with the patient. The patient expressed understanding and agreed to alert me immediately or to go to the emergency room if they experience any of these.   Treatment plan was developed with input from the patient/family, and they expressed understanding and agreement with the plan. All questions were answered today.        Disclaimer: This note was prepared using a voice recognition system and is likely to have sound alike errors within the text.

## 2023-02-24 ENCOUNTER — CLINICAL SUPPORT (OUTPATIENT)
Dept: REHABILITATION | Facility: HOSPITAL | Age: 33
End: 2023-02-24
Payer: COMMERCIAL

## 2023-02-24 DIAGNOSIS — M25.662 DECREASED RANGE OF MOTION (ROM) OF LEFT KNEE: ICD-10-CM

## 2023-02-24 DIAGNOSIS — Z74.09 DECREASED FUNCTIONAL MOBILITY AND ENDURANCE: ICD-10-CM

## 2023-02-24 DIAGNOSIS — R29.898 DECREASED STRENGTH OF LOWER EXTREMITY: Primary | ICD-10-CM

## 2023-02-24 PROCEDURE — 97112 NEUROMUSCULAR REEDUCATION: CPT | Mod: PN

## 2023-02-24 PROCEDURE — 97110 THERAPEUTIC EXERCISES: CPT | Mod: PN

## 2023-02-24 PROCEDURE — 97140 MANUAL THERAPY 1/> REGIONS: CPT | Mod: PN

## 2023-02-24 NOTE — PROGRESS NOTES
[facility]   Physical Therapy Treatment Note     Name: Mike Aburto  Clinic Number: [id number]    Therapy Diagnosis:   Encounter Diagnoses   Name Primary?    Decreased strength of lower extremity Yes    Decreased range of motion (ROM) of left knee     Decreased functional mobility and endurance      Physician: Danial Ribera MD    Visit Date: 2/24/2023    Physician Orders: PT Eval and Treat   Medical Diagnosis from Referral: Injury to multiple structures of left knee [S89.92XA]  Evaluation Date: 1/25/2023  Authorization Period Expiration: 1/26/23  Plan of Care Expiration: 4/25/23  Progress Note Due: 2/25/23  Visit # / Visits authorized: 13/12 (1/1 eval)  FOTO: Not performed    PTA Visit #: 0/5     Postoperative Plan:  ACL with meniscus repair protocol  Weight bearing:  Nonweightbearing for 6 weeks  Range of motion:  0-90 for 4  Antibiotics: 5 days of PO prophylactic antibiotics  DVT Ppx:  Aspirin daily  PT/OT: Start POD#3  Follow-up: 10-14 days with AP/Lateral of operative knee (non-weightbearing)    Time In: 11:55 AM  Time Out: 12:50 PM  Total Billable Time: 55 minutes    SUBJECTIVE     Pt reports: he had a small slip in the shower and put some weight through his L knee. He reports he thinks it has been clicking a little bit more than usual since then.  He was compliant with home exercise program.  Response to previous treatment: no notable change  Functional change: no notable change    Pain: 7/10  Location: left knee     OBJECTIVE     To be updated at progress evaluations unless otherwise specified.    Treatment     Mike received the treatments listed below:       therapeutic exercises to develop strength, endurance, ROM, flexibility, posture, and core stabilization for 23 minutes including:  - Heel Prop - 7# - 5 minutes              - L gastroc stretch with strap - 2 minutes              - L hamstring stretch seated - 2 minutes              - prone hip extension L - 3x10               - Heel slides to 90 degrees - 10x10s     manual therapy techniques:  for 8 minutes, including:              - knee PROM to improve flexion and extension range of motion              - patella mobilizations     neuromuscular re-education activities to improve: Balance, Coordination, Kinesthetic, Sense, Proprioception, and Posture for 24 minutes. The following activities were included:   - Quad set with towel with Russian - 5 minutes              - SAQ  - 0# - 3x10              - LAQ in seated  - 0# - 3x10              - LAQ in Supine  - 0# - 3x10              - SLR  - 0# - 3x10              - Prone Quad Sets - 3x10              - SL Hip Abduction B - 3x10      therapeutic activities to improve functional performance for 0  minutes, including:              - none performed today     gait training to improve functional mobility and safety for 0  minutes, including:              - none performed today     supervised modalities after being cleared for contradictions: Lebanese Electrical Stimulation: Mike received Russian Electrical Stimulation for pain control applied to the knee. Mike tolderated treatment well without any adverse effects.      Patient Education and Home Exercises     Home Exercises Provided and Patient Education Provided     Education provided:   - importance of consistency with HEP  - role of physical therapy for this condition    Written Home Exercises Provided: Patient instructed to cont prior HEP. Exercises were reviewed and Mike was able to demonstrate them prior to the end of the session.  Mike demonstrated good  understanding of the education provided. See EMR under Patient Instructions for exercises provided during therapy sessions    ASSESSMENT     Patient with no increase in swelling or instability since slip in the shower yesterday. He was able to progress off of Russian stim today and maintain excellent quadriceps control during quad facilitation activity. He continues to  make good progress and will benefit from continued care.    Mike Is progressing well towards his goals.   Pt prognosis is Good.     Pt will continue to benefit from skilled outpatient physical therapy to address the deficits listed in the problem list box on initial evaluation, provide pt/family education and to maximize pt's level of independence in the home and community environment.     Pt's spiritual, cultural and educational needs considered and pt agreeable to plan of care and goals.     Anticipated barriers to physical therapy: none    Goals:  Short-Term Goals: 6 weeks  - The patient will be independent with initial home exercise program. (Progressing)  - The patient will increase strength to at least 3+/5 to perform functional mobility including gait and sit to stand (Progressing)  - The patient will increase knee ROM grossly to 0-90 degrees to perform ADLs with pain < 5/10. (Progressing)     Long-Term Goals: 12 weeks  - Pt to achieve <25% limitation as measured by the FOTO to demonstrate decreased disability.   - The patient will be independent amb with no assistive device on all surfaces for community distances. (Progressing)   - The patient will increase strength to at least 4+/5 to perform functional mobility including gait, stairs.(Progressing)  - The patient will increase knee ROM grossly to WFL to perform all ADLs with pain < 3/10.(Progressing)    PLAN     Plan of care Certification: 1/25/2023 to 4/25/23.     Outpatient Physical Therapy 2 times weekly for 12 weeks to include the following interventions: Manual Therapy, Moist Heat/ Ice, Neuromuscular Re-ed, Patient Education, Therapeutic Activities, Therapeutic Exercise, Gait Training, Electrical Stimulation PRN, and Dry needling PRN.     This patient had a ligamentous repair in his L knee. I expect he will require 6-9 months of physical therapy to safely return to his prior level of function to allow for restored strength and adequate tissue  healing.    Elliott Hartley, PT

## 2023-02-27 ENCOUNTER — CLINICAL SUPPORT (OUTPATIENT)
Dept: REHABILITATION | Facility: HOSPITAL | Age: 33
End: 2023-02-27
Payer: COMMERCIAL

## 2023-02-27 DIAGNOSIS — Z74.09 DECREASED FUNCTIONAL MOBILITY AND ENDURANCE: ICD-10-CM

## 2023-02-27 DIAGNOSIS — R29.898 DECREASED STRENGTH OF LOWER EXTREMITY: Primary | ICD-10-CM

## 2023-02-27 DIAGNOSIS — M25.662 DECREASED RANGE OF MOTION (ROM) OF LEFT KNEE: ICD-10-CM

## 2023-02-27 PROCEDURE — 97112 NEUROMUSCULAR REEDUCATION: CPT | Mod: PN

## 2023-02-27 PROCEDURE — 97140 MANUAL THERAPY 1/> REGIONS: CPT | Mod: PN

## 2023-02-27 PROCEDURE — 97110 THERAPEUTIC EXERCISES: CPT | Mod: PN

## 2023-02-27 NOTE — PROGRESS NOTES
[facility]   Physical Therapy Treatment Note     Name: Mike Aburto  Clinic Number: [id number]    Therapy Diagnosis:   Encounter Diagnoses   Name Primary?    Decreased strength of lower extremity Yes    Decreased range of motion (ROM) of left knee     Decreased functional mobility and endurance        Physician: Danial Ribera MD    Visit Date: 2/27/2023    Physician Orders: PT Eval and Treat   Medical Diagnosis from Referral: Injury to multiple structures of left knee [S89.92XA]  Evaluation Date: 1/25/2023  Authorization Period Expiration: 1/26/23  Plan of Care Expiration: 4/25/23  Progress Note Due: 2/25/23  Visit # / Visits authorized: 14/12 (1/1 eval)  FOTO: Not performed    PTA Visit #: 0/5     Postoperative Plan:  ACL with meniscus repair protocol  Weight bearing:  Nonweightbearing for 6 weeks  Range of motion:  0-90 for 4  Antibiotics: 5 days of PO prophylactic antibiotics  DVT Ppx:  Aspirin daily  PT/OT: Start POD#3  Follow-up: 10-14 days with AP/Lateral of operative knee (non-weightbearing)    Time In: 3:30 PM  Time Out: 4:26 PM  Total Billable Time: 56 minutes    SUBJECTIVE     Pt reports: he is doing well today. He reports he has been consistent with his HEP. He has no new complaints today.  He was compliant with home exercise program.  Response to previous treatment: no notable change  Functional change: no notable change    Pain: 7/10  Location: left knee     OBJECTIVE     To be updated at progress evaluations unless otherwise specified.    Treatment     Mike received the treatments listed below:       therapeutic exercises to develop strength, endurance, ROM, flexibility, posture, and core stabilization for 23 minutes including:  - Heel Prop - 7# - 5 minutes              - L gastroc stretch with strap - 2 minutes              - L hamstring stretch seated - 2 minutes              - prone hip extension L - 3x10              - Heel slides to 90 degrees - 10x10s      manual therapy techniques:  for 8 minutes, including:              - knee PROM to improve flexion and extension range of motion              - patella mobilizations     neuromuscular re-education activities to improve: Balance, Coordination, Kinesthetic, Sense, Proprioception, and Posture for 23 minutes. The following activities were included:   - Quad set with towel with Russian - 5 minutes              - SAQ  - 0# - 3x10              - LAQ in seated  - 0# - 3x10              - LAQ in Supine  - 0# - 3x10              - SLR  - 0# - 3x10              - Prone Quad Sets - 3x10              - SL Hip Abduction B - 3x10      therapeutic activities to improve functional performance for 0  minutes, including:              - none performed today     gait training to improve functional mobility and safety for 0  minutes, including:              - none performed today     supervised modalities after being cleared for contradictions: Bel[place]ian Electrical Stimulation: Mike received Russian Electrical Stimulation for pain control applied to the knee. Mike tolderated treatment well without any adverse effects.      Patient Education and Home Exercises     Home Exercises Provided and Patient Education Provided     Education provided:   - importance of consistency with HEP  - role of physical therapy for this condition    Written Home Exercises Provided: Patient instructed to cont prior HEP. Exercises were reviewed and Mike was able to demonstrate them prior to the end of the session.  Mike demonstrated good  understanding of the education provided. See EMR under Patient Instructions for exercises provided during therapy sessions    ASSESSMENT     Patient tolerated today's treatment well. Knee range of motion and quad strength continue to improve. He will benefit from continued physical therapy care.    Mike Is progressing well towards his goals.   Pt prognosis is Good.     Pt will continue to benefit from skilled  outpatient physical therapy to address the deficits listed in the problem list box on initial evaluation, provide pt/family education and to maximize pt's level of independence in the home and community environment.     Pt's spiritual, cultural and educational needs considered and pt agreeable to plan of care and goals.     Anticipated barriers to physical therapy: none    Goals:  Short-Term Goals: 6 weeks  - The patient will be independent with initial home exercise program. (Progressing)  - The patient will increase strength to at least 3+/5 to perform functional mobility including gait and sit to stand (Progressing)  - The patient will increase knee ROM grossly to 0-90 degrees to perform ADLs with pain < 5/10. (Progressing)     Long-Term Goals: 12 weeks  - Pt to achieve <25% limitation as measured by the FOTO to demonstrate decreased disability.   - The patient will be independent amb with no assistive device on all surfaces for community distances. (Progressing)   - The patient will increase strength to at least 4+/5 to perform functional mobility including gait, stairs.(Progressing)  - The patient will increase knee ROM grossly to WFL to perform all ADLs with pain < 3/10.(Progressing)    PLAN     Plan of care Certification: 1/25/2023 to 4/25/23.     Outpatient Physical Therapy 2 times weekly for 12 weeks to include the following interventions: Manual Therapy, Moist Heat/ Ice, Neuromuscular Re-ed, Patient Education, Therapeutic Activities, Therapeutic Exercise, Gait Training, Electrical Stimulation PRN, and Dry needling PRN.     This patient had a ligamentous repair in his L knee. I expect he will require 6-9 months of physical therapy to safely return to his prior level of function to allow for restored strength and adequate tissue healing.    Elliott Hartley, PT

## 2023-02-28 NOTE — PROGRESS NOTES
[facility]   Physical Therapy Treatment Note     Name: Mike Aburto  Clinic Number: [id number]    Therapy Diagnosis:   Encounter Diagnoses   Name Primary?    Decreased strength of lower extremity Yes    Decreased range of motion (ROM) of left knee     Decreased functional mobility and endurance      Physician: No ref. provider found    Visit Date: 3/1/2023    Physician Orders: PT Eval and Treat   Medical Diagnosis from Referral: Injury to multiple structures of left knee [S89.92XA]  Evaluation Date: 1/25/2023  Authorization Period Expiration: 1/26/23  Plan of Care Expiration: 4/25/23  Progress Note Due: 4/1/23  Visit # / Visits authorized: 16/12 (1/1 eval)  FOTO: Not performed    PTA Visit #: 0/5     Postoperative Plan:  ACL with meniscus repair protocol  Weight bearing:  Nonweightbearing for 6 weeks  Range of motion:  0-90 for 4  Antibiotics: 5 days of PO prophylactic antibiotics  DVT Ppx:  Aspirin daily  PT/OT: Start POD#3  Follow-up: 10-14 days with AP/Lateral of operative knee (non-weightbearing)    Time In: 2:00 PM  Time Out: 2:30 PM  Total Billable Time: 30 minutes    SUBJECTIVE     Pt reports: he is doing well today. He reports he still has a good bit of soreness in his knee.  He was compliant with home exercise program.  Response to previous treatment: no notable change  Functional change: no notable change    Pain: 7/10  Location: left knee     OBJECTIVE     Observation - wounds appear well healing     Range of Motion:     Knee range of motion (-2-0-115)    Treatment     Mike received the treatments listed below:       therapeutic exercises to develop strength, endurance, ROM, flexibility, posture, and core stabilization for 10 minutes including:  - Heel Prop - 7# - 5 minutes (not performed today)              - L gastroc stretch with strap - 2 minutes (not performed today)              - L hamstring stretch seated - 2 minutes              - prone hip extension L - 3# -  3x10              - Heel slides - 10x10s (not performed today)   - Knee extension isometrics at 90 degrees flexion - 10x     manual therapy techniques:  for 10 minutes, including:              - knee PROM to improve flexion and extension range of motion              - patella mobilizations     neuromuscular re-education activities to improve: Balance, Coordination, Kinesthetic, Sense, Proprioception, and Posture for 10 minutes. The following activities were included:               - SAQ  - 3# - 3x10              - LAQ in seated  - 3# - 3x10              - LAQ in Supine  - 3# - 3x10              - SLR  - 3# - 3x10              - Prone Quad Sets - 3x10              - SL Hip Abduction B - 3x10 (not performed today)      therapeutic activities to improve functional performance for 0  minutes, including:              - none performed today     gait training to improve functional mobility and safety for 0  minutes, including:              - none performed today    Patient Education and Home Exercises     Home Exercises Provided and Patient Education Provided     Education provided:   - importance of consistency with HEP  - role of physical therapy for this condition    Written Home Exercises Provided: Patient instructed to cont prior HEP. Exercises were reviewed and Mike was able to demonstrate them prior to the end of the session.  Mike demonstrated good  understanding of the education provided. See EMR under Patient Instructions for exercises provided during therapy sessions    ASSESSMENT     Patient tolerated today's treatment well. Encouraged improved timely attendance for physical therapy sessions. He is progressing well toward his goals and will need continued physical therapy care as he continues his progress toward his prior level of function.    Mike Is progressing well towards his goals.   Pt prognosis is Good.     Pt will continue to benefit from skilled outpatient physical therapy to address the deficits  listed in the problem list box on initial evaluation, provide pt/family education and to maximize pt's level of independence in the home and community environment.     Pt's spiritual, cultural and educational needs considered and pt agreeable to plan of care and goals.     Anticipated barriers to physical therapy: none    Goals:  Short-Term Goals: 6 weeks  - The patient will be independent with initial home exercise program. (Progressing)  - The patient will increase strength to at least 3+/5 to perform functional mobility including gait and sit to stand (Progressing)  - The patient will increase knee ROM grossly to 0-90 degrees to perform ADLs with pain < 5/10. (Progressing)     Long-Term Goals: 12 weeks  - Pt to achieve <25% limitation as measured by the FOTO to demonstrate decreased disability.   - The patient will be independent amb with no assistive device on all surfaces for community distances. (Progressing)   - The patient will increase strength to at least 4+/5 to perform functional mobility including gait, stairs.(Progressing)  - The patient will increase knee ROM grossly to WFL to perform all ADLs with pain < 3/10.(Progressing)    PLAN     Plan of care Certification: 1/25/2023 to 4/25/23.     Outpatient Physical Therapy 2 times weekly for 12 weeks to include the following interventions: Manual Therapy, Moist Heat/ Ice, Neuromuscular Re-ed, Patient Education, Therapeutic Activities, Therapeutic Exercise, Gait Training, Electrical Stimulation PRN, and Dry needling PRN.     This patient had a ligamentous repair in his L knee. I expect he will require 6-9 months of physical therapy to safely return to his prior level of function to allow for restored strength and adequate tissue healing.    Elliott Hartley, PT

## 2023-03-01 ENCOUNTER — CLINICAL SUPPORT (OUTPATIENT)
Dept: REHABILITATION | Facility: HOSPITAL | Age: 33
End: 2023-03-01
Payer: COMMERCIAL

## 2023-03-01 DIAGNOSIS — Z74.09 DECREASED FUNCTIONAL MOBILITY AND ENDURANCE: ICD-10-CM

## 2023-03-01 DIAGNOSIS — M25.662 DECREASED RANGE OF MOTION (ROM) OF LEFT KNEE: ICD-10-CM

## 2023-03-01 DIAGNOSIS — R29.898 DECREASED STRENGTH OF LOWER EXTREMITY: Primary | ICD-10-CM

## 2023-03-01 PROCEDURE — 97110 THERAPEUTIC EXERCISES: CPT | Mod: PN

## 2023-03-01 PROCEDURE — 97140 MANUAL THERAPY 1/> REGIONS: CPT | Mod: PN

## 2023-03-01 PROCEDURE — 97112 NEUROMUSCULAR REEDUCATION: CPT | Mod: PN

## 2023-03-03 ENCOUNTER — CLINICAL SUPPORT (OUTPATIENT)
Dept: REHABILITATION | Facility: HOSPITAL | Age: 33
End: 2023-03-03
Payer: COMMERCIAL

## 2023-03-03 DIAGNOSIS — M25.662 DECREASED RANGE OF MOTION (ROM) OF LEFT KNEE: ICD-10-CM

## 2023-03-03 DIAGNOSIS — Z74.09 DECREASED FUNCTIONAL MOBILITY AND ENDURANCE: ICD-10-CM

## 2023-03-03 DIAGNOSIS — R29.898 DECREASED STRENGTH OF LOWER EXTREMITY: Primary | ICD-10-CM

## 2023-03-03 PROCEDURE — 97140 MANUAL THERAPY 1/> REGIONS: CPT | Mod: PN

## 2023-03-03 PROCEDURE — 97112 NEUROMUSCULAR REEDUCATION: CPT | Mod: PN

## 2023-03-03 PROCEDURE — 97110 THERAPEUTIC EXERCISES: CPT | Mod: PN

## 2023-03-03 NOTE — PROGRESS NOTES
[facility]   Physical Therapy Treatment Note     Name: Mike Aburto  Clinic Number: [id number]    Therapy Diagnosis:   Encounter Diagnoses   Name Primary?    Decreased strength of lower extremity Yes    Decreased range of motion (ROM) of left knee     Decreased functional mobility and endurance      Physician: No ref. provider found - Dr. Danial Ribera    Visit Date: 3/3/2023    Physician Orders: PT Eval and Treat   Medical Diagnosis from Referral: Injury to multiple structures of left knee [S89.92XA]  Evaluation Date: 1/25/2023  Authorization Period Expiration: 1/26/23  Plan of Care Expiration: 4/25/23  Progress Note Due: 4/1/23  Visit # / Visits authorized: 17  FOTO: Not performed    PTA Visit #: 0/5     Postoperative Plan:  ACL with meniscus repair protocol  Weight bearing:  Nonweightbearing for 6 weeks  Range of motion:  0-90 for 4  Antibiotics: 5 days of PO prophylactic antibiotics  DVT Ppx:  Aspirin daily  PT/OT: Start POD#3  Follow-up: 10-14 days with AP/Lateral of operative knee (non-weightbearing)    Time In: 10:30 AM  Time Out: 11:25 AM  Total Billable Time: 55 minutes    SUBJECTIVE     Pt reports: he has started to notice more progress. He reports he feels ready to start weight-bearing.  He was compliant with home exercise program.  Response to previous treatment: no notable change  Functional change: no notable change    Pain: 7/10  Location: left knee     OBJECTIVE     Observation - wounds appear well healing     Range of Motion:     Knee range of motion (-2-0-115)    Treatment     Mike received the treatments listed below:       therapeutic exercises to develop strength, endurance, ROM, flexibility, posture, and core stabilization for 15 minutes including:  - Heel Prop - 7# - 5 minutes              - L gastroc stretch with strap - 2 minutes              - L hamstring stretch seated - 2 minutes              - prone hip extension L - 5# - 3x10     manual therapy  techniques:  for 10 minutes, including:              - knee PROM to improve flexion and extension range of motion              - patella mobilizations     neuromuscular re-education activities to improve: Balance, Coordination, Kinesthetic, Sense, Proprioception, and Posture for 30 minutes. The following activities were included:               - SAQ  - 5# - 3x10              - LAQ in seated  - 5# - 3x10              - LAQ in Supine  - 5# - 3x10              - SLR  - 5# - 3x10              - Prone Quad Sets - 3x10 - 5#      therapeutic activities to improve functional performance for 0  minutes, including:              - none performed today     gait training to improve functional mobility and safety for 0  minutes, including:              - none performed today    Patient Education and Home Exercises     Home Exercises Provided and Patient Education Provided     Education provided:   - importance of consistency with HEP  - role of physical therapy for this condition    Written Home Exercises Provided: Patient instructed to cont prior HEP. Exercises were reviewed and Mike was able to demonstrate them prior to the end of the session.  Mike demonstrated good  understanding of the education provided. See EMR under Patient Instructions for exercises provided during therapy sessions    ASSESSMENT     Patient tolerated today's treatment well. His quad control is good and range of motion is progressing appropriately. He will benefit from continued care.    Mike Is progressing well towards his goals.   Pt prognosis is Good.     Pt will continue to benefit from skilled outpatient physical therapy to address the deficits listed in the problem list box on initial evaluation, provide pt/family education and to maximize pt's level of independence in the home and community environment.     Pt's spiritual, cultural and educational needs considered and pt agreeable to plan of care and goals.     Anticipated barriers to  physical therapy: none    Goals:  Short-Term Goals: 6 weeks  - The patient will be independent with initial home exercise program. (Progressing)  - The patient will increase strength to at least 3+/5 to perform functional mobility including gait and sit to stand (Progressing)  - The patient will increase knee ROM grossly to 0-90 degrees to perform ADLs with pain < 5/10. (Progressing)     Long-Term Goals: 12 weeks  - Pt to achieve <25% limitation as measured by the FOTO to demonstrate decreased disability.   - The patient will be independent amb with no assistive device on all surfaces for community distances. (Progressing)   - The patient will increase strength to at least 4+/5 to perform functional mobility including gait, stairs.(Progressing)  - The patient will increase knee ROM grossly to WFL to perform all ADLs with pain < 3/10.(Progressing)    PLAN     Plan of care Certification: 1/25/2023 to 4/25/23.     Outpatient Physical Therapy 2 times weekly for 12 weeks to include the following interventions: Manual Therapy, Moist Heat/ Ice, Neuromuscular Re-ed, Patient Education, Therapeutic Activities, Therapeutic Exercise, Gait Training, Electrical Stimulation PRN, and Dry needling PRN.     This patient had a ligamentous repair in his L knee. I expect he will require 6-9 months of physical therapy to safely return to his prior level of function to allow for restored strength and adequate tissue healing.    Elliott Hartley, PT

## 2023-03-06 ENCOUNTER — CLINICAL SUPPORT (OUTPATIENT)
Dept: REHABILITATION | Facility: HOSPITAL | Age: 33
End: 2023-03-06
Payer: COMMERCIAL

## 2023-03-06 DIAGNOSIS — R29.898 DECREASED STRENGTH OF LOWER EXTREMITY: Primary | ICD-10-CM

## 2023-03-06 DIAGNOSIS — Z74.09 DECREASED FUNCTIONAL MOBILITY AND ENDURANCE: ICD-10-CM

## 2023-03-06 DIAGNOSIS — M25.662 DECREASED RANGE OF MOTION (ROM) OF LEFT KNEE: ICD-10-CM

## 2023-03-06 PROCEDURE — 97110 THERAPEUTIC EXERCISES: CPT | Mod: PN

## 2023-03-06 PROCEDURE — 97112 NEUROMUSCULAR REEDUCATION: CPT | Mod: PN

## 2023-03-06 PROCEDURE — 97140 MANUAL THERAPY 1/> REGIONS: CPT | Mod: PN

## 2023-03-06 NOTE — PROGRESS NOTES
[facility]   Physical Therapy Treatment Note     Name: Mike Aburto  Clinic Number: [id number]    Therapy Diagnosis:   Encounter Diagnoses   Name Primary?    Decreased strength of lower extremity Yes    Decreased range of motion (ROM) of left knee     Decreased functional mobility and endurance        Physician: No ref. provider found - Dr. Danial Ribera    Visit Date: 3/6/2023    Physician Orders: PT Eval and Treat   Medical Diagnosis from Referral: Injury to multiple structures of left knee [S89.92XA]  Evaluation Date: 1/25/2023  Authorization Period Expiration: 1/26/23  Plan of Care Expiration: 4/25/23  Progress Note Due: 4/1/23  Visit # / Visits authorized: 18  FOTO: Not performed    PTA Visit #: 0/5     Postoperative Plan:  ACL with meniscus repair protocol    Time In: 10:20 AM  Time Out: 11:05 AM  Total Billable Time: 45 minutes    SUBJECTIVE     Pt reports: he is doing well today. He is 6 weeks post-op and arrived fully weight-bearing and was able to ride his motorcycle to his physical therapy session today. He reports he feels good weight-bearing.  He was compliant with home exercise program.  Response to previous treatment: no notable change  Functional change: no notable change    Pain: 7/10  Location: left knee     OBJECTIVE     Observation - wounds appear well healing     Range of Motion:     Knee range of motion (-2-0-115)    Treatment     Mike received the treatments listed below:       therapeutic exercises to develop strength, endurance, ROM, flexibility, posture, and core stabilization for 8 minutes including:  - Heel Prop - 7# - 5 minutes              - L gastroc stretch on wedge - 2 minutes              - prone hip extension L - 5# - 3x10     manual therapy techniques:  for 8 minutes, including:              - knee PROM to improve flexion and extension range of motion              - patella mobilizations     neuromuscular re-education activities to improve:  Balance, Coordination, Kinesthetic, Sense, Proprioception, and Posture for 29 minutes. The following activities were included:               - SAQ  - 5# - 3x10              - LAQ in seated  - 5# - 3x10              - SLR  - 5# - 3x10   - Mini squats - 3x10   - Single leg shuttle squats L - 2 bands - 3x10      therapeutic activities to improve functional performance for 0  minutes, including:              - none performed today     gait training to improve functional mobility and safety for 0  minutes, including:              - none performed today    Patient Education and Home Exercises     Home Exercises Provided and Patient Education Provided     Education provided:   - importance of consistency with HEP  - role of physical therapy for this condition    Written Home Exercises Provided: Patient instructed to cont prior HEP. Exercises were reviewed and Mike was able to demonstrate them prior to the end of the session.  Mike demonstrated good  understanding of the education provided. See EMR under Patient Instructions for exercises provided during therapy sessions    ASSESSMENT     Advised patient that riding his motorcycle is not advisable yet due to current post-op status. He acknowledged that he understood the risks. Gait pattern is good and non-antalgic. He did well transitioning to light CKC activity today. He will need continued physical therapy care.    Mike Is progressing well towards his goals.   Pt prognosis is Good.     Pt will continue to benefit from skilled outpatient physical therapy to address the deficits listed in the problem list box on initial evaluation, provide pt/family education and to maximize pt's level of independence in the home and community environment.     Pt's spiritual, cultural and educational needs considered and pt agreeable to plan of care and goals.     Anticipated barriers to physical therapy: none    Goals:  Short-Term Goals: 6 weeks  - The patient will be independent  with initial home exercise program. (Progressing)  - The patient will increase strength to at least 3+/5 to perform functional mobility including gait and sit to stand (Progressing)  - The patient will increase knee ROM grossly to 0-90 degrees to perform ADLs with pain < 5/10. (Progressing)     Long-Term Goals: 12 weeks  - Pt to achieve <25% limitation as measured by the FOTO to demonstrate decreased disability.   - The patient will be independent amb with no assistive device on all surfaces for community distances. (Progressing)   - The patient will increase strength to at least 4+/5 to perform functional mobility including gait, stairs.(Progressing)  - The patient will increase knee ROM grossly to WFL to perform all ADLs with pain < 3/10.(Progressing)    PLAN     Plan of care Certification: 1/25/2023 to 4/25/23.     Outpatient Physical Therapy 2 times weekly for 12 weeks to include the following interventions: Manual Therapy, Moist Heat/ Ice, Neuromuscular Re-ed, Patient Education, Therapeutic Activities, Therapeutic Exercise, Gait Training, Electrical Stimulation PRN, and Dry needling PRN.     This patient had a ligamentous repair in his L knee. I expect he will require 6-9 months of physical therapy to safely return to his prior level of function to allow for restored strength and adequate tissue healing.    Elliott Hartley, PT

## 2023-03-08 ENCOUNTER — CLINICAL SUPPORT (OUTPATIENT)
Dept: REHABILITATION | Facility: HOSPITAL | Age: 33
End: 2023-03-08
Payer: COMMERCIAL

## 2023-03-08 DIAGNOSIS — Z74.09 DECREASED FUNCTIONAL MOBILITY AND ENDURANCE: ICD-10-CM

## 2023-03-08 DIAGNOSIS — M25.662 DECREASED RANGE OF MOTION (ROM) OF LEFT KNEE: ICD-10-CM

## 2023-03-08 DIAGNOSIS — R29.898 DECREASED STRENGTH OF LOWER EXTREMITY: Primary | ICD-10-CM

## 2023-03-08 PROCEDURE — 97112 NEUROMUSCULAR REEDUCATION: CPT | Mod: PN

## 2023-03-08 PROCEDURE — 97140 MANUAL THERAPY 1/> REGIONS: CPT | Mod: PN

## 2023-03-08 PROCEDURE — 97110 THERAPEUTIC EXERCISES: CPT | Mod: PN

## 2023-03-08 NOTE — PROGRESS NOTES
[facility]   Physical Therapy Treatment Note     Name: Mike Aburto  Clinic Number: [id number]    Therapy Diagnosis:   Encounter Diagnoses   Name Primary?    Decreased strength of lower extremity Yes    Decreased range of motion (ROM) of left knee     Decreased functional mobility and endurance          Physician: No ref. provider found - Dr. Danial Ribera    Visit Date: 3/9/2023    Physician Orders: PT Eval and Treat   Medical Diagnosis from Referral: Injury to multiple structures of left knee [S89.92XA]  Evaluation Date: 1/25/2023  Authorization Period Expiration: 1/26/23  Plan of Care Expiration: 4/25/23  Progress Note Due: 4/1/23  Visit # / Visits authorized: 18  FOTO: Not performed    PTA Visit #: 0/5     Postoperative Plan:  ACL with meniscus repair protocol    Time In: 11:00 AM  Time Out: 11:45 AM  Total Billable Time: 45 minutes    SUBJECTIVE     Pt reports: he is doing well today. He denies any new knee pain.   He was compliant with home exercise program.  Response to previous treatment: no notable change  Functional change: no notable change    Pain: 7/10  Location: left knee     OBJECTIVE     To be updated at progress evaluations unless otherwise specified.    Range of motion -2 - 0 - 120 (3/8/23)    Treatment     Mike received the treatments listed below:       therapeutic exercises to develop strength, endurance, ROM, flexibility, posture, and core stabilization for 10 minutes including:  - Upright Bike - 5 minutes              - L gastroc stretch on wedge - 2 minutes              - Standing Hamstring stretch - 2 minutes     manual therapy techniques:  for 8 minutes, including:              - knee PROM to improve flexion and extension range of motion              - patella mobilizations     neuromuscular re-education activities to improve: Balance, Coordination, Kinesthetic, Sense, Proprioception, and Posture for 27 minutes. The following activities were included:                - SAQ  - 5# - 3x10              - LAQ in seated  - 5# - 3x10              - SLR  - 5# - 3x10   - Mini squats - 3x10   - Single leg shuttle squats L - 2 bands - 3x10   - Step Ups - 6 inches - 3x10   - Single leg standing - 5x30s - knee bent      therapeutic activities to improve functional performance for 0  minutes, including:              - none performed today     gait training to improve functional mobility and safety for 0  minutes, including:              - none performed today    Patient Education and Home Exercises     Home Exercises Provided and Patient Education Provided     Education provided:   - importance of consistency with HEP  - role of physical therapy for this condition    Written Home Exercises Provided: Patient instructed to cont prior HEP. Exercises were reviewed and Mike was able to demonstrate them prior to the end of the session.  Mike demonstrated good  understanding of the education provided. See EMR under Patient Instructions for exercises provided during therapy sessions    ASSESSMENT     Patient tolerated today's treatment well. Quad strength is progressing. He is progressing appropriately given his post-op status. He will benefit from continued care.    Mike Is progressing well towards his goals.   Pt prognosis is Good.     Pt will continue to benefit from skilled outpatient physical therapy to address the deficits listed in the problem list box on initial evaluation, provide pt/family education and to maximize pt's level of independence in the home and community environment.     Pt's spiritual, cultural and educational needs considered and pt agreeable to plan of care and goals.     Anticipated barriers to physical therapy: none    Goals:  Short-Term Goals: 6 weeks  - The patient will be independent with initial home exercise program. (Progressing)  - The patient will increase strength to at least 3+/5 to perform functional mobility including gait and sit to stand  (Progressing)  - The patient will increase knee ROM grossly to 0-90 degrees to perform ADLs with pain < 5/10. (Progressing)     Long-Term Goals: 12 weeks  - Pt to achieve <25% limitation as measured by the FOTO to demonstrate decreased disability.   - The patient will be independent amb with no assistive device on all surfaces for community distances. (Progressing)   - The patient will increase strength to at least 4+/5 to perform functional mobility including gait, stairs.(Progressing)  - The patient will increase knee ROM grossly to WFL to perform all ADLs with pain < 3/10.(Progressing)    PLAN     Plan of care Certification: 1/25/2023 to 4/25/23.     Outpatient Physical Therapy 2 times weekly for 12 weeks to include the following interventions: Manual Therapy, Moist Heat/ Ice, Neuromuscular Re-ed, Patient Education, Therapeutic Activities, Therapeutic Exercise, Gait Training, Electrical Stimulation PRN, and Dry needling PRN.     This patient had a ligamentous repair in his L knee. I expect he will require 6-9 months of physical therapy to safely return to his prior level of function to allow for restored strength and adequate tissue healing.    Elliott Hartley, PT

## 2023-03-08 NOTE — PROGRESS NOTES
[facility]   Physical Therapy Treatment Note     Name: Mike Aburto  Clinic Number: [id number]    Therapy Diagnosis:   Encounter Diagnoses   Name Primary?    Decreased strength of lower extremity Yes    Decreased range of motion (ROM) of left knee     Decreased functional mobility and endurance        Physician: No ref. provider found - Dr. Danial Ribera    Visit Date: 3/8/2023    Physician Orders: PT Eval and Treat   Medical Diagnosis from Referral: Injury to multiple structures of left knee [S89.92XA]  Evaluation Date: 1/25/2023  Authorization Period Expiration: 1/26/23  Plan of Care Expiration: 4/25/23  Progress Note Due: 4/1/23  Visit # / Visits authorized: 18  FOTO: Not performed    PTA Visit #: 0/5     Postoperative Plan:  ACL with meniscus repair protocol    Time In: 09:30 AM  Time Out: 10:30 AM  Total Billable Time: 60 minutes    SUBJECTIVE     Pt reports: he is doing well today. He reports he did not have any soreness after his last physical therapy visit. He has no new complaints today.  He was compliant with home exercise program.  Response to previous treatment: no notable change  Functional change: no notable change    Pain: 7/10  Location: left knee     OBJECTIVE     To be updated at progress evaluations unless otherwise specified.    Range of motion -2 - 0 - 120 (3/8/23)    Treatment     Mike received the treatments listed below:       therapeutic exercises to develop strength, endurance, ROM, flexibility, posture, and core stabilization for 10 minutes including:  - Upright Bike - 5 minutes              - L gastroc stretch on wedge - 2 minutes              - Standing Hamstring stretch - 2 minutes     manual therapy techniques:  for 8 minutes, including:              - knee PROM to improve flexion and extension range of motion              - patella mobilizations     neuromuscular re-education activities to improve: Balance, Coordination, Kinesthetic, Sense,  Proprioception, and Posture for 42 minutes. The following activities were included:               - SAQ  - 5# - 3x10              - LAQ in seated  - 5# - 3x10              - SLR  - 5# - 3x10   - Mini squats - 3x10   - Single leg shuttle squats L - 2 bands - 3x10   - Step Ups - 6 inches - 3x10   - Single leg standing - 5x30s - knee bent      therapeutic activities to improve functional performance for 0  minutes, including:              - none performed today     gait training to improve functional mobility and safety for 0  minutes, including:              - none performed today    Patient Education and Home Exercises     Home Exercises Provided and Patient Education Provided     Education provided:   - importance of consistency with HEP  - role of physical therapy for this condition    Written Home Exercises Provided: Patient instructed to cont prior HEP. Exercises were reviewed and Mike was able to demonstrate them prior to the end of the session.  Mike demonstrated good  understanding of the education provided. See EMR under Patient Instructions for exercises provided during therapy sessions    ASSESSMENT     Patient tolerated today's treatment well. He continues to transition well to more CKC activity. Range of motion is steadily improving as well. He will benefit from continued care.    Mike Is progressing well towards his goals.   Pt prognosis is Good.     Pt will continue to benefit from skilled outpatient physical therapy to address the deficits listed in the problem list box on initial evaluation, provide pt/family education and to maximize pt's level of independence in the home and community environment.     Pt's spiritual, cultural and educational needs considered and pt agreeable to plan of care and goals.     Anticipated barriers to physical therapy: none    Goals:  Short-Term Goals: 6 weeks  - The patient will be independent with initial home exercise program. (Progressing)  - The patient will  increase strength to at least 3+/5 to perform functional mobility including gait and sit to stand (Progressing)  - The patient will increase knee ROM grossly to 0-90 degrees to perform ADLs with pain < 5/10. (Progressing)     Long-Term Goals: 12 weeks  - Pt to achieve <25% limitation as measured by the FOTO to demonstrate decreased disability.   - The patient will be independent amb with no assistive device on all surfaces for community distances. (Progressing)   - The patient will increase strength to at least 4+/5 to perform functional mobility including gait, stairs.(Progressing)  - The patient will increase knee ROM grossly to WFL to perform all ADLs with pain < 3/10.(Progressing)    PLAN     Plan of care Certification: 1/25/2023 to 4/25/23.     Outpatient Physical Therapy 2 times weekly for 12 weeks to include the following interventions: Manual Therapy, Moist Heat/ Ice, Neuromuscular Re-ed, Patient Education, Therapeutic Activities, Therapeutic Exercise, Gait Training, Electrical Stimulation PRN, and Dry needling PRN.     This patient had a ligamentous repair in his L knee. I expect he will require 6-9 months of physical therapy to safely return to his prior level of function to allow for restored strength and adequate tissue healing.    Elliott Hartley, PT

## 2023-03-09 ENCOUNTER — CLINICAL SUPPORT (OUTPATIENT)
Dept: REHABILITATION | Facility: HOSPITAL | Age: 33
End: 2023-03-09
Payer: COMMERCIAL

## 2023-03-09 DIAGNOSIS — M25.662 DECREASED RANGE OF MOTION (ROM) OF LEFT KNEE: ICD-10-CM

## 2023-03-09 DIAGNOSIS — Z74.09 DECREASED FUNCTIONAL MOBILITY AND ENDURANCE: ICD-10-CM

## 2023-03-09 DIAGNOSIS — R29.898 DECREASED STRENGTH OF LOWER EXTREMITY: Primary | ICD-10-CM

## 2023-03-09 PROCEDURE — 97110 THERAPEUTIC EXERCISES: CPT | Mod: PN

## 2023-03-09 PROCEDURE — 97112 NEUROMUSCULAR REEDUCATION: CPT | Mod: PN

## 2023-03-09 PROCEDURE — 97140 MANUAL THERAPY 1/> REGIONS: CPT | Mod: PN

## 2023-03-10 ENCOUNTER — TELEPHONE (OUTPATIENT)
Dept: ORTHOPEDICS | Facility: CLINIC | Age: 33
End: 2023-03-10
Payer: COMMERCIAL

## 2023-03-10 ENCOUNTER — OFFICE VISIT (OUTPATIENT)
Dept: ORTHOPEDICS | Facility: CLINIC | Age: 33
End: 2023-03-10
Payer: COMMERCIAL

## 2023-03-10 DIAGNOSIS — Z98.890 S/P MEDIAL MENISCUS REPAIR OF LEFT KNEE: ICD-10-CM

## 2023-03-10 DIAGNOSIS — S83.242D TEAR OF MEDIAL MENISCUS OF LEFT KNEE, CURRENT, UNSPECIFIED TEAR TYPE, SUBSEQUENT ENCOUNTER: ICD-10-CM

## 2023-03-10 DIAGNOSIS — M62.559 ATROPHY OF QUADRICEPS FEMORIS MUSCLE: Primary | ICD-10-CM

## 2023-03-10 DIAGNOSIS — Z98.890 POST-OPERATIVE STATE: ICD-10-CM

## 2023-03-10 DIAGNOSIS — Z98.890 S/P ACL RECONSTRUCTION: ICD-10-CM

## 2023-03-10 DIAGNOSIS — Z98.890 S/P LATERAL MENISCUS REPAIR OF LEFT KNEE: ICD-10-CM

## 2023-03-10 DIAGNOSIS — S83.282D TEAR OF LATERAL MENISCUS OF LEFT KNEE, CURRENT, UNSPECIFIED TEAR TYPE, SUBSEQUENT ENCOUNTER: ICD-10-CM

## 2023-03-10 PROCEDURE — 99999 PR PBB SHADOW E&M-EST. PATIENT-LVL III: CPT | Mod: PBBFAC,,, | Performed by: PHYSICIAN ASSISTANT

## 2023-03-10 PROCEDURE — 1160F RVW MEDS BY RX/DR IN RCRD: CPT | Mod: CPTII,S$GLB,, | Performed by: PHYSICIAN ASSISTANT

## 2023-03-10 PROCEDURE — 99999 PR PBB SHADOW E&M-EST. PATIENT-LVL III: ICD-10-PCS | Mod: PBBFAC,,, | Performed by: PHYSICIAN ASSISTANT

## 2023-03-10 PROCEDURE — 99024 POSTOP FOLLOW-UP VISIT: CPT | Mod: S$GLB,,, | Performed by: PHYSICIAN ASSISTANT

## 2023-03-10 PROCEDURE — 1160F PR REVIEW ALL MEDS BY PRESCRIBER/CLIN PHARMACIST DOCUMENTED: ICD-10-PCS | Mod: CPTII,S$GLB,, | Performed by: PHYSICIAN ASSISTANT

## 2023-03-10 PROCEDURE — 99024 PR POST-OP FOLLOW-UP VISIT: ICD-10-PCS | Mod: S$GLB,,, | Performed by: PHYSICIAN ASSISTANT

## 2023-03-10 PROCEDURE — 1159F MED LIST DOCD IN RCRD: CPT | Mod: CPTII,S$GLB,, | Performed by: PHYSICIAN ASSISTANT

## 2023-03-10 PROCEDURE — 1159F PR MEDICATION LIST DOCUMENTED IN MEDICAL RECORD: ICD-10-PCS | Mod: CPTII,S$GLB,, | Performed by: PHYSICIAN ASSISTANT

## 2023-03-10 NOTE — TELEPHONE ENCOUNTER
----- Message from Jessica Bajwa sent at 3/10/2023 11:19 AM CST -----  Pt is requesting a call back in regards to just getting car fix and want to come to appt but need to know will he still be seen. Pt can be reached at [phone]

## 2023-03-10 NOTE — PROGRESS NOTES
Patient ID: Mike Aburto  YOB: 1990  MRN: [id number]    Chief Complaint: Post-op Evaluation of the Left Knee    Referred By: post op patient    History of Present Illness: Mike Aburto is a  32 y.o. male    with a chief complaint of Post-op Evaluation of the Left Knee      Mike Aburto presents today 6 weeks status post L Knee ACL recon w/ quad auto, MMR, LMR, bone spur excision on 1/24/23. She presents FWB without brace/assistive devices. The patient has continued  physical therapy at [facility]. Overall there are no issues or concerns.     Past Medical History:   Past Medical History:   Diagnosis Date    Allergy     Seasonal allergies      Past Surgical History:   Procedure Laterality Date    ARTHROSCOPY,KNEE,WITH MENISCUS REPAIR Left 1/24/2023    Procedure: ARTHROSCOPY,KNEE,WITH MENISCUS REPAIR;  Surgeon: Danial Ribera MD;  Location: [facility];  Service: Orthopedics;  Laterality: Left;  medial and lateral repair    FRACTURE SURGERY      left lower arm    HAND SURGERY Right 2011    RECONSTRUCTION OF ANTERIOR CRUCIATE LIGAMENT USING GRAFT Left 1/24/2023    Procedure: RECONSTRUCTION, KNEE, ACL, USING GRAFT;  Surgeon: Danial Ribera MD;  Location: [facility];  Service: Orthopedics;  Laterality: Left;  Left knee arthroscopy, anterior cruciate ligament reconstruction with quadriceps tendon autograft    TONSILLECTOMY       Family History   Problem Relation Age of Onset    No Known Problems Mother     No Known Problems Father     Cancer Maternal Uncle     Cancer Maternal Grandfather     Diabetes Paternal Grandmother      Social History     Socioeconomic History    Marital status: Single   Tobacco Use    Smoking status: Every Day     Packs/day: 0.50     Types: Cigarettes    Smokeless tobacco: Never   Substance and Sexual Activity    Alcohol use: Yes     Comment: occassionally    Drug use: Never    Sexual activity: Yes     Partners: Female     Medication List with  Changes/Refills   Current Medications    ASPIRIN (ECOTRIN) 325 MG EC TABLET    Take 1 tablet (325 mg total) by mouth once daily. Complete full 6 weeks    DEXTROAMPHETAMINE-AMPHETAMINE 10 MG TAB    Take 1 tablet (10 mg total) by mouth once daily.    DOCUSATE SODIUM (COLACE) 100 MG CAPSULE    Take 1 capsule (100 mg total) by mouth 2 (two) times daily.    ERGOCALCIFEROL (ERGOCALCIFEROL) 50,000 UNIT CAP    Take by mouth.    GABAPENTIN (NEURONTIN) 600 MG TABLET    Take 1 tablet (600 mg total) by mouth 3 (three) times daily.    ONDANSETRON (ZOFRAN) 4 MG TABLET    Take 1 tablet (4 mg total) by mouth every 8 (eight) hours as needed for Nausea.    OXYCODONE-ACETAMINOPHEN (PERCOCET) 5-325 MG PER TABLET    Take 1 tablet by mouth every 4 to 6 hours as needed for Pain.    PHENTERMINE (ADIPEX-P) 37.5 MG TABLET    Take 37.5 mg by mouth.     Review of patient's allergies indicates:  No Known Allergies  Review of Systems   Constitutional: Negative for chills and fever.   HENT:  Negative for sore throat.    Eyes:  Negative for pain.   Cardiovascular:  Negative for chest pain and leg swelling.   Respiratory:  Negative for cough and shortness of breath.    Skin:  Negative for itching and rash.   Musculoskeletal:  Positive for joint pain and joint swelling.   Gastrointestinal:  Negative for abdominal pain, nausea and vomiting.   Genitourinary:  Negative for dysuria.   Neurological:  Negative for dizziness, numbness and paresthesias.     Physical Exam:   There is no height or weight on file to calculate BMI.  There were no vitals filed for this visit.   GENERAL: Well appearing, appropriate for stated age, no acute distress.  CARDIOVASCULAR: Pulses regular by peripheral palpation.  PULMONARY: Respirations are even and non-labored.  NEURO: Awake, alert, and oriented x 3.  PSYCH: Mood & affect are appropriate.  HEENT: Head is normocephalic and atraumatic.    General    Nursing note and vitals reviewed.          Left Knee Exam  Incision  sites clean dry and healed, no signs of infection  Minimal effusion   Knee ROM full extension to 120 degrees flexion   All compartments are soft and compressible. Calf soft non-tender. Intact EHL, FHL, gastrocsoleus, and tibialis anterior. Sensation intact to light touch in superficial peroneal, deep peroneal, tibial, sural, and saphenous nerve distributions. Foot warm and well perfused with capillary refill of less than 2 seconds and palpable pedal pulses.       Imaging:   XR Results:  Results for orders placed during the hospital encounter of 02/06/23    X-Ray Knee 1 or 2 View Left    Narrative  EXAMINATION:  Left knee two views    CLINICAL HISTORY:  Knee pain post ACL construction    COMPARISON:  None    FINDINGS:  Bones are intact without fracture or dislocation.  Surgical changes consistent with ACL repair.  Joint fluid suspected..    Impression  Positive joint effusion and surgical changes consistent with ACL repair.      Electronically signed by: Alfred Panda MD  Date:    02/06/2023  Time:    13:50        Relevant imaging results reviewed and interpreted by me, discussed with the patient and / or family today.      Patient Instructions   Assessment:  Mike Aburto is a  32 y.o. male    with a chief complaint of Post-op Evaluation of the Left Knee  6 weeks status post L Knee ACL recon w/ quad auto, MMR, LMR, bone spur excision on 1/24/23  Post-op    Encounter Diagnoses   Name Primary?    Atrophy of quadriceps femoris muscle Yes    Post-operative state     S/P ACL reconstruction     Tear of lateral meniscus of left knee, current, unspecified tear type, subsequent encounter     Tear of medial meniscus of left knee, current, unspecified tear type, subsequent encounter     S/P lateral meniscus repair of left knee     S/P medial meniscus repair of left knee       Plan:  Measured for functional brace today   Continue physical therapy at [facility]   Weight bearing   Range of motion as  tolerated  Follow up with Dr. Danial Ribera in 6 weeks  Follow up with Dr. Ilda Bonilla for other MSK issues    Follow-up: 6 weeks with Dr. Danial Ribera or sooner if there are any problems between now and then.    Leave Review:   Google: Leave Google Review  Healthgrades: Leave Healthgrades Review    After Hours Number: [phone]      Provider Note/Medical Decision Making:   Patient is improving with function and with pain. He is currently seeing pain management for chronic pain.    I discussed worrisome and red flag signs and symptoms with the patient. The patient expressed understanding and agreed to alert me immediately or to go to the emergency room if they experience any of these.   Treatment plan was developed with input from the patient/family, and they expressed understanding and agreement with the plan. All questions were answered today.        Disclaimer: This note was prepared using a voice recognition system and is likely to have sound alike errors within the text.

## 2023-03-14 ENCOUNTER — CLINICAL SUPPORT (OUTPATIENT)
Dept: REHABILITATION | Facility: HOSPITAL | Age: 33
End: 2023-03-14
Payer: COMMERCIAL

## 2023-03-14 DIAGNOSIS — R29.898 DECREASED STRENGTH OF LOWER EXTREMITY: Primary | ICD-10-CM

## 2023-03-14 DIAGNOSIS — M25.662 DECREASED RANGE OF MOTION (ROM) OF LEFT KNEE: ICD-10-CM

## 2023-03-14 DIAGNOSIS — Z74.09 DECREASED FUNCTIONAL MOBILITY AND ENDURANCE: ICD-10-CM

## 2023-03-14 PROCEDURE — 97140 MANUAL THERAPY 1/> REGIONS: CPT | Mod: PN

## 2023-03-14 PROCEDURE — 97112 NEUROMUSCULAR REEDUCATION: CPT | Mod: PN

## 2023-03-14 PROCEDURE — 97110 THERAPEUTIC EXERCISES: CPT | Mod: PN

## 2023-03-14 NOTE — PROGRESS NOTES
[facility]   Physical Therapy Treatment Note     Name: Mike Aburto  Clinic Number: [id number]    Therapy Diagnosis:   Encounter Diagnoses   Name Primary?    Decreased strength of lower extremity Yes    Decreased range of motion (ROM) of left knee     Decreased functional mobility and endurance            Physician: No ref. provider found - Dr. Danial Ribera    Visit Date: 3/14/2023    Physician Orders: PT Eval and Treat   Medical Diagnosis from Referral: Injury to multiple structures of left knee [S89.92XA]  Evaluation Date: 1/25/2023  Authorization Period Expiration: 1/26/23  Plan of Care Expiration: 4/25/23  Progress Note Due: 4/1/23  Visit # / Visits authorized: 21  FOTO: Not performed    PTA Visit #: 0/5     Postoperative Plan:  ACL with meniscus repair protocol    Time In: 10:15 AM  Time Out: 11:15 AM  Total Billable Time: 60 minutes    SUBJECTIVE     Pt reports: he is doing well today. He reports his recent MD visit went well. He has no new complaints.   He was compliant with home exercise program.  Response to previous treatment: no notable change  Functional change: no notable change    Pain: 7/10  Location: left knee     OBJECTIVE     To be updated at progress evaluations unless otherwise specified.    Range of motion -2 - 0 - 120 (3/8/23)    Treatment     Mike received the treatments listed below:       therapeutic exercises to develop strength, endurance, ROM, flexibility, posture, and core stabilization for 10 minutes including:  - Upright Bike - 5 minutes              - L gastroc stretch on wedge - 2 minutes              - Standing Hamstring stretch - 2 minutes     manual therapy techniques:  for 8 minutes, including:              - knee PROM to improve flexion and extension range of motion              - patella mobilizations     neuromuscular re-education activities to improve: Balance, Coordination, Kinesthetic, Sense, Proprioception, and Posture for 42 minutes. The  following activities were included:               - SAQ  - 5# - 3x10              - LAQ in seated  - 5# - 3x10              - SLR  - 5# - 3x10   - Squats - 3x10   - Single leg shuttle squats L - 6 bands - 3x10   - Step Ups - 8 inches - 3x10   - Single leg standing - 5x30s - knee bent   - elevated single leg bridge (shoulders on 18 inch box) - 2x10 B      therapeutic activities to improve functional performance for 0  minutes, including:              - none performed today     gait training to improve functional mobility and safety for 0  minutes, including:              - none performed today    Patient Education and Home Exercises     Home Exercises Provided and Patient Education Provided     Education provided:   - importance of consistency with HEP  - role of physical therapy for this condition    Written Home Exercises Provided: Patient instructed to cont prior HEP. Exercises were reviewed and Mike was able to demonstrate them prior to the end of the session.  Mike demonstrated good  understanding of the education provided. See EMR under Patient Instructions for exercises provided during therapy sessions    ASSESSMENT     Patient continues to demonstrate improving strength. He is progressing well and will benefit from continued physical therapy care.    Mike Is progressing well towards his goals.   Pt prognosis is Good.     Pt will continue to benefit from skilled outpatient physical therapy to address the deficits listed in the problem list box on initial evaluation, provide pt/family education and to maximize pt's level of independence in the home and community environment.     Pt's spiritual, cultural and educational needs considered and pt agreeable to plan of care and goals.     Anticipated barriers to physical therapy: none    Goals:  Short-Term Goals: 6 weeks  - The patient will be independent with initial home exercise program. (Progressing)  - The patient will increase strength to at least 3+/5 to  perform functional mobility including gait and sit to stand (Progressing)  - The patient will increase knee ROM grossly to 0-90 degrees to perform ADLs with pain < 5/10. (Progressing)     Long-Term Goals: 12 weeks  - Pt to achieve <25% limitation as measured by the FOTO to demonstrate decreased disability.   - The patient will be independent amb with no assistive device on all surfaces for community distances. (Progressing)   - The patient will increase strength to at least 4+/5 to perform functional mobility including gait, stairs.(Progressing)  - The patient will increase knee ROM grossly to WFL to perform all ADLs with pain < 3/10.(Progressing)    PLAN     Plan of care Certification: 1/25/2023 to 4/25/23.     Outpatient Physical Therapy 2 times weekly for 12 weeks to include the following interventions: Manual Therapy, Moist Heat/ Ice, Neuromuscular Re-ed, Patient Education, Therapeutic Activities, Therapeutic Exercise, Gait Training, Electrical Stimulation PRN, and Dry needling PRN.     This patient had a ligamentous repair in his L knee. I expect he will require 6-9 months of physical therapy to safely return to his prior level of function to allow for restored strength and adequate tissue healing.    Elliott Hartley, PT

## 2023-03-15 ENCOUNTER — CLINICAL SUPPORT (OUTPATIENT)
Dept: REHABILITATION | Facility: HOSPITAL | Age: 33
End: 2023-03-15
Payer: COMMERCIAL

## 2023-03-15 DIAGNOSIS — Z74.09 DECREASED FUNCTIONAL MOBILITY AND ENDURANCE: ICD-10-CM

## 2023-03-15 DIAGNOSIS — M25.662 DECREASED RANGE OF MOTION (ROM) OF LEFT KNEE: ICD-10-CM

## 2023-03-15 DIAGNOSIS — R29.898 DECREASED STRENGTH OF LOWER EXTREMITY: Primary | ICD-10-CM

## 2023-03-15 PROCEDURE — 97140 MANUAL THERAPY 1/> REGIONS: CPT | Mod: PN

## 2023-03-15 PROCEDURE — 97110 THERAPEUTIC EXERCISES: CPT | Mod: PN

## 2023-03-15 PROCEDURE — 97112 NEUROMUSCULAR REEDUCATION: CPT | Mod: PN

## 2023-03-15 NOTE — PATIENT INSTRUCTIONS
Assessment:  Mike Aburto is a  32 y.o. male    with a chief complaint of Post-op Evaluation of the Left Knee  6 weeks status post L Knee ACL recon w/ quad auto, MMR, LMR, bone spur excision on 1/24/23  Post-op    Encounter Diagnoses   Name Primary?    Atrophy of quadriceps femoris muscle Yes    Post-operative state     S/P ACL reconstruction     Tear of lateral meniscus of left knee, current, unspecified tear type, subsequent encounter     Tear of medial meniscus of left knee, current, unspecified tear type, subsequent encounter     S/P lateral meniscus repair of left knee     S/P medial meniscus repair of left knee       Plan:  Measured for functional brace today   Continue physical therapy at [facility]   Weight bearing   Range of motion as tolerated  Follow up with Dr. Danial Ribera in 6 weeks  Follow up with Dr. Ilda Bonilla for other MSK issues    Follow-up: 6 weeks with Dr. Danial Ribera or sooner if there are any problems between now and then.    Leave Review:   Google: Leave Google Review  Healthgrades: Leave Healthgrades Review    After Hours Number: [phone]

## 2023-03-15 NOTE — PROGRESS NOTES
[facility]   Physical Therapy Treatment Note     Name: Mike Aburto  Clinic Number: [id number]    Therapy Diagnosis:   Encounter Diagnoses   Name Primary?    Decreased strength of lower extremity Yes    Decreased range of motion (ROM) of left knee     Decreased functional mobility and endurance      Physician: No ref. provider found - Dr. Danial Ribera    Visit Date: 3/15/2023    Physician Orders: PT Eval and Treat   Medical Diagnosis from Referral: Injury to multiple structures of left knee [S89.92XA]  Evaluation Date: 1/25/2023  Authorization Period Expiration: 1/26/23  Plan of Care Expiration: 4/25/23  Progress Note Due: 4/1/23  Visit # / Visits authorized: 22  FOTO: Not performed    PTA Visit #: 0/5     Postoperative Plan:  ACL with meniscus repair protocol    Time In: 2:35 PM  Time Out: 3:38 PM  Total Billable Time: 63 minutes    SUBJECTIVE     Pt reports: he is doing well today. He reports he has been having less knee pain and feels like his knee strength is improving.  He was compliant with home exercise program.  Response to previous treatment: no notable change  Functional change: no notable change    Pain: 7/10  Location: left knee     OBJECTIVE     To be updated at progress evaluations unless otherwise specified.    Range of motion -2 - 0 - 120 (3/8/23)    Treatment     Mike received the treatments listed below:       therapeutic exercises to develop strength, endurance, ROM, flexibility, posture, and core stabilization for 10 minutes including:  - Upright Bike - 5 minutes              - L gastroc stretch on wedge - 2 minutes              - Standing Hamstring stretch - 2 minutes     manual therapy techniques:  for 8 minutes, including:              - knee PROM to improve flexion and extension range of motion              - patella mobilizations     neuromuscular re-education activities to improve: Balance, Coordination, Kinesthetic, Sense, Proprioception, and Posture for 45  minutes. The following activities were included:               - SAQ  - 10# - 3x10              - LAQ in seated  - 10# - 3x10              - SLR  - 10# - 3x10   - Squats - 3x10   - Single leg shuttle squats L - 6 bands - 3x10   - Step Ups - 8 inches - 3x10   - Single leg standing - 5x30s - knee bent   - elevated single leg bridge (shoulders on 18 inch box) - 2x10 B      therapeutic activities to improve functional performance for 0  minutes, including:              - none performed today     gait training to improve functional mobility and safety for 0  minutes, including:              - none performed today    Patient Education and Home Exercises     Home Exercises Provided and Patient Education Provided     Education provided:   - importance of consistency with HEP  - role of physical therapy for this condition    Written Home Exercises Provided: Patient instructed to cont prior HEP. Exercises were reviewed and Mike was able to demonstrate them prior to the end of the session.  Mike demonstrated good  understanding of the education provided. See EMR under Patient Instructions for exercises provided during therapy sessions    ASSESSMENT     Mike is making steady progress with his leg strength and functional ability. He will benefit from continued physical therapy care.    Mike Is progressing well towards his goals.   Pt prognosis is Good.     Pt will continue to benefit from skilled outpatient physical therapy to address the deficits listed in the problem list box on initial evaluation, provide pt/family education and to maximize pt's level of independence in the home and community environment.     Pt's spiritual, cultural and educational needs considered and pt agreeable to plan of care and goals.     Anticipated barriers to physical therapy: none    Goals:  Short-Term Goals: 6 weeks  - The patient will be independent with initial home exercise program. (Progressing)  - The patient will increase strength  to at least 3+/5 to perform functional mobility including gait and sit to stand (Progressing)  - The patient will increase knee ROM grossly to 0-90 degrees to perform ADLs with pain < 5/10. (Progressing)     Long-Term Goals: 12 weeks  - Pt to achieve <25% limitation as measured by the FOTO to demonstrate decreased disability.   - The patient will be independent amb with no assistive device on all surfaces for community distances. (Progressing)   - The patient will increase strength to at least 4+/5 to perform functional mobility including gait, stairs.(Progressing)  - The patient will increase knee ROM grossly to WFL to perform all ADLs with pain < 3/10.(Progressing)    PLAN     Plan of care Certification: 1/25/2023 to 4/25/23.     Outpatient Physical Therapy 2 times weekly for 12 weeks to include the following interventions: Manual Therapy, Moist Heat/ Ice, Neuromuscular Re-ed, Patient Education, Therapeutic Activities, Therapeutic Exercise, Gait Training, Electrical Stimulation PRN, and Dry needling PRN.     This patient had a ligamentous repair in his L knee. I expect he will require 6-9 months of physical therapy to safely return to his prior level of function to allow for restored strength and adequate tissue healing.    Elliott Hartley, PT

## 2023-03-20 ENCOUNTER — CLINICAL SUPPORT (OUTPATIENT)
Dept: REHABILITATION | Facility: HOSPITAL | Age: 33
End: 2023-03-20
Payer: COMMERCIAL

## 2023-03-20 DIAGNOSIS — M25.662 DECREASED RANGE OF MOTION (ROM) OF LEFT KNEE: ICD-10-CM

## 2023-03-20 DIAGNOSIS — R29.898 DECREASED STRENGTH OF LOWER EXTREMITY: Primary | ICD-10-CM

## 2023-03-20 DIAGNOSIS — Z74.09 DECREASED FUNCTIONAL MOBILITY AND ENDURANCE: ICD-10-CM

## 2023-03-20 PROCEDURE — 97110 THERAPEUTIC EXERCISES: CPT | Mod: PN

## 2023-03-20 PROCEDURE — 97112 NEUROMUSCULAR REEDUCATION: CPT | Mod: PN

## 2023-03-20 NOTE — PROGRESS NOTES
[facility]   Physical Therapy Treatment Note     Name: Mike Aburto  Clinic Number: [id number]    Therapy Diagnosis:   Encounter Diagnoses   Name Primary?    Decreased strength of lower extremity Yes    Decreased range of motion (ROM) of left knee     Decreased functional mobility and endurance        Physician: No ref. provider found - Dr. Danial Ribera    Visit Date: 3/20/2023    Physician Orders: PT Eval and Treat   Medical Diagnosis from Referral: Injury to multiple structures of left knee [S89.92XA]  Evaluation Date: 1/25/2023  Authorization Period Expiration: 1/26/23  Plan of Care Expiration: 4/25/23  Progress Note Due: 4/1/23  Visit # / Visits authorized: 23  FOTO: Not performed    PTA Visit #: 0/5     Postoperative Plan:  ACL with meniscus repair protocol    Time In: 1:55 PM  Time Out: 2:50 PM  Total Billable Time: 55 minutes    SUBJECTIVE     Pt reports: he is doing well. He reports he is still having knee pain but that he is getting around better.  He was compliant with home exercise program.  Response to previous treatment: no notable change  Functional change: no notable change    Pain: 7/10  Location: left knee     OBJECTIVE     To be updated at progress evaluations unless otherwise specified.    Range of motion -2 - 0 - 120 (3/8/23)    Treatment     Mike received the treatments listed below:       therapeutic exercises to develop strength, endurance, ROM, flexibility, posture, and core stabilization for 10 minutes including:  - Upright Bike - 5 minutes              - L gastroc stretch on wedge - 2 minutes              - Standing Hamstring stretch - 2 minutes     manual therapy techniques:  for 0 minutes, including:              - knee PROM to improve flexion and extension range of motion              - patella mobilizations     neuromuscular re-education activities to improve: Balance, Coordination, Kinesthetic, Sense, Proprioception, and Posture for 45 minutes. The  following activities were included:               - SAQ  - 10# - 3x10              - LAQ in seated  - 10# - 3x10              - SLR  - 10# - 3x10   - Squats - 3x10   - Single leg shuttle squats L - 6 bands - 3x10   - Step Ups - 8 inches - 3x10   - Single leg standing - 5x30s - knee bent   - elevated single leg bridge (shoulders on 18 inch box) - 2x10 B      therapeutic activities to improve functional performance for 0  minutes, including:              - none performed today     gait training to improve functional mobility and safety for 0  minutes, including:              - none performed today    Patient Education and Home Exercises     Home Exercises Provided and Patient Education Provided     Education provided:   - importance of consistency with HEP  - role of physical therapy for this condition    Written Home Exercises Provided: Patient instructed to cont prior HEP. Exercises were reviewed and Mike was able to demonstrate them prior to the end of the session.  Mike demonstrated good  understanding of the education provided. See EMR under Patient Instructions for exercises provided during therapy sessions    ASSESSMENT     Patient tolerated today's treatment well. Quad strength continues to show signs of improvement. He will benefit from continued care.    Mike Is progressing well towards his goals.   Pt prognosis is Good.     Pt will continue to benefit from skilled outpatient physical therapy to address the deficits listed in the problem list box on initial evaluation, provide pt/family education and to maximize pt's level of independence in the home and community environment.     Pt's spiritual, cultural and educational needs considered and pt agreeable to plan of care and goals.     Anticipated barriers to physical therapy: none    Goals:  Short-Term Goals: 6 weeks  - The patient will be independent with initial home exercise program. (Progressing)  - The patient will increase strength to at least  3+/5 to perform functional mobility including gait and sit to stand (Progressing)  - The patient will increase knee ROM grossly to 0-90 degrees to perform ADLs with pain < 5/10. (Progressing)     Long-Term Goals: 12 weeks  - Pt to achieve <25% limitation as measured by the FOTO to demonstrate decreased disability.   - The patient will be independent amb with no assistive device on all surfaces for community distances. (Progressing)   - The patient will increase strength to at least 4+/5 to perform functional mobility including gait, stairs.(Progressing)  - The patient will increase knee ROM grossly to WFL to perform all ADLs with pain < 3/10.(Progressing)    PLAN     Plan of care Certification: 1/25/2023 to 4/25/23.     Outpatient Physical Therapy 2 times weekly for 12 weeks to include the following interventions: Manual Therapy, Moist Heat/ Ice, Neuromuscular Re-ed, Patient Education, Therapeutic Activities, Therapeutic Exercise, Gait Training, Electrical Stimulation PRN, and Dry needling PRN.     This patient had a ligamentous repair in his L knee. I expect he will require 6-9 months of physical therapy to safely return to his prior level of function to allow for restored strength and adequate tissue healing.    Elliott Hratley, PT

## 2023-03-23 ENCOUNTER — CLINICAL SUPPORT (OUTPATIENT)
Dept: REHABILITATION | Facility: HOSPITAL | Age: 33
End: 2023-03-23
Payer: COMMERCIAL

## 2023-03-23 DIAGNOSIS — R29.898 DECREASED STRENGTH OF LOWER EXTREMITY: Primary | ICD-10-CM

## 2023-03-23 DIAGNOSIS — Z74.09 DECREASED FUNCTIONAL MOBILITY AND ENDURANCE: ICD-10-CM

## 2023-03-23 DIAGNOSIS — M25.662 DECREASED RANGE OF MOTION (ROM) OF LEFT KNEE: ICD-10-CM

## 2023-03-23 PROCEDURE — 97140 MANUAL THERAPY 1/> REGIONS: CPT | Mod: PN

## 2023-03-23 PROCEDURE — 97110 THERAPEUTIC EXERCISES: CPT | Mod: PN

## 2023-03-23 PROCEDURE — 97112 NEUROMUSCULAR REEDUCATION: CPT | Mod: PN

## 2023-03-23 NOTE — PROGRESS NOTES
[facility]   Physical Therapy Treatment Note     Name: Mike Aburto  Clinic Number: [id number]    Therapy Diagnosis:   Encounter Diagnoses   Name Primary?    Decreased strength of lower extremity Yes    Decreased range of motion (ROM) of left knee     Decreased functional mobility and endurance        Physician: No ref. provider found - Dr. Danial Ribera    Visit Date: 3/23/2023    Physician Orders: PT Eval and Treat   Medical Diagnosis from Referral: Injury to multiple structures of left knee [S89.92XA]  Evaluation Date: 1/25/2023  Authorization Period Expiration: 1/26/23  Plan of Care Expiration: 4/25/23  Progress Note Due: 4/1/23  Visit # / Visits authorized: 24  FOTO: Not performed    PTA Visit #: 0/5     Postoperative Plan:  ACL with meniscus repair protocol    Time In: 3:10 PM  Time Out: 4:10 PM  Total Billable Time: 60 minutes    SUBJECTIVE     Pt reports: he is doing well today. He reports his knee feels like it is doing better overall. He reports he still notices that his LLE gets tired easily.  He was compliant with home exercise program.  Response to previous treatment: no notable change  Functional change: no notable change    Pain: 7/10  Location: left knee     OBJECTIVE     To be updated at progress evaluations unless otherwise specified.    Range of motion -2 - 0 - 120 (3/8/23)    Treatment     Mike received the treatments listed below:       therapeutic exercises to develop strength, endurance, ROM, flexibility, posture, and core stabilization for 10 minutes including:  - Upright Bike - 5 minutes              - L gastroc stretch on wedge - 2 minutes              - Standing Hamstring stretch - 2 minutes     manual therapy techniques:  for 10 minutes, including:              - knee PROM to improve flexion and extension range of motion              - patella mobilizations     neuromuscular re-education activities to improve: Balance, Coordination, Kinesthetic, Sense,  Proprioception, and Posture for 40 minutes. The following activities were included:               - SAQ  - 10# - 3x10              - LAQ in seated  - 10# - 3x10              - SLR  - 10# - 3x10   - Squats - kettlebell goblet 35# - 3x10   - Single leg shuttle squats L - 6 bands - 3x10   - Step Ups - 8 inches - 3x10   - Single leg standing - 5x30s - knee bent   - elevated single leg bridge (shoulders on 18 inch box) - 2x10 B      therapeutic activities to improve functional performance for 0  minutes, including:              - none performed today     gait training to improve functional mobility and safety for 0  minutes, including:              - none performed today    Patient Education and Home Exercises     Home Exercises Provided and Patient Education Provided     Education provided:   - importance of consistency with HEP  - role of physical therapy for this condition    Written Home Exercises Provided: Patient instructed to cont prior HEP. Exercises were reviewed and Mike was able to demonstrate them prior to the end of the session.  Mike demonstrated good  understanding of the education provided. See EMR under Patient Instructions for exercises provided during therapy sessions    ASSESSMENT     Patient tolerated today's treatment well. L quad still fatigues easily. Verbal cueing and tactile cueing still required for valgus prevention during CKC exercises. He will benefit from continued physical therapy care.    Mike Is progressing well towards his goals.   Pt prognosis is Good.     Pt will continue to benefit from skilled outpatient physical therapy to address the deficits listed in the problem list box on initial evaluation, provide pt/family education and to maximize pt's level of independence in the home and community environment.     Pt's spiritual, cultural and educational needs considered and pt agreeable to plan of care and goals.     Anticipated barriers to physical therapy:  none    Goals:  Short-Term Goals: 6 weeks  - The patient will be independent with initial home exercise program. (Progressing)  - The patient will increase strength to at least 3+/5 to perform functional mobility including gait and sit to stand (Progressing)  - The patient will increase knee ROM grossly to 0-90 degrees to perform ADLs with pain < 5/10. (Progressing)     Long-Term Goals: 12 weeks  - Pt to achieve <25% limitation as measured by the FOTO to demonstrate decreased disability.   - The patient will be independent amb with no assistive device on all surfaces for community distances. (Progressing)   - The patient will increase strength to at least 4+/5 to perform functional mobility including gait, stairs.(Progressing)  - The patient will increase knee ROM grossly to WFL to perform all ADLs with pain < 3/10.(Progressing)    PLAN     Plan of care Certification: 1/25/2023 to 4/25/23.     Outpatient Physical Therapy 2 times weekly for 12 weeks to include the following interventions: Manual Therapy, Moist Heat/ Ice, Neuromuscular Re-ed, Patient Education, Therapeutic Activities, Therapeutic Exercise, Gait Training, Electrical Stimulation PRN, and Dry needling PRN.     This patient had a ligamentous repair in his L knee. I expect he will require 6-9 months of physical therapy to safely return to his prior level of function to allow for restored strength and adequate tissue healing.    Elliott Hartley, PT

## 2023-03-27 ENCOUNTER — CLINICAL SUPPORT (OUTPATIENT)
Dept: REHABILITATION | Facility: HOSPITAL | Age: 33
End: 2023-03-27
Payer: COMMERCIAL

## 2023-03-27 DIAGNOSIS — M25.662 DECREASED RANGE OF MOTION (ROM) OF LEFT KNEE: ICD-10-CM

## 2023-03-27 DIAGNOSIS — Z74.09 DECREASED FUNCTIONAL MOBILITY AND ENDURANCE: ICD-10-CM

## 2023-03-27 DIAGNOSIS — R29.898 DECREASED STRENGTH OF LOWER EXTREMITY: Primary | ICD-10-CM

## 2023-03-27 PROCEDURE — 97110 THERAPEUTIC EXERCISES: CPT | Mod: PN

## 2023-03-27 PROCEDURE — 97112 NEUROMUSCULAR REEDUCATION: CPT | Mod: PN

## 2023-03-27 PROCEDURE — 97140 MANUAL THERAPY 1/> REGIONS: CPT | Mod: PN

## 2023-03-27 NOTE — PROGRESS NOTES
[facility]   Physical Therapy Treatment Note     Name: Mike Aburto  Clinic Number: [id number]    Therapy Diagnosis:   Encounter Diagnoses   Name Primary?    Decreased strength of lower extremity Yes    Decreased range of motion (ROM) of left knee     Decreased functional mobility and endurance        Physician: No ref. provider found - Dr. Danial Ribera    Visit Date: 3/27/2023    Physician Orders: PT Eval and Treat   Medical Diagnosis from Referral: Injury to multiple structures of left knee [S89.92XA]  Evaluation Date: 1/25/2023  Authorization Period Expiration: 1/26/23  Plan of Care Expiration: 4/25/23  Progress Note Due: 4/25/23  Visit # / Visits authorized: 25  FOTO: Not performed    PTA Visit #: 0/5     Postoperative Plan:  ACL with meniscus repair protocol    Time In: 8:40 AM  Time Out: 9:40 AM  Total Billable Time: 60 minutes    SUBJECTIVE     Pt reports: he is doing well today. He reports his knee feels good today.  He was compliant with home exercise program.  Response to previous treatment: no notable change  Functional change: no notable change    Pain: 7/10  Location: left knee     OBJECTIVE         Range of motion -2 - 0 - 125 (3/27/23)            Treatment     Mike received the treatments listed below:       therapeutic exercises to develop strength, endurance, ROM, flexibility, posture, and core stabilization for 10 minutes including:  - Upright Bike - 5 minutes              - L gastroc stretch on wedge - 2 minutes              - Standing Hamstring stretch - 2 minutes   - single leg step down test - see results in objective section     manual therapy techniques:  for 10 minutes, including:              - knee PROM to improve flexion and extension range of motion              - patella mobilizations     neuromuscular re-education activities to improve: Balance, Coordination, Kinesthetic, Sense, Proprioception, and Posture for 40 minutes. The following activities were  included:               - SAQ  - 10# - 3x10              - LAQ in seated  - 10# - 3x10              - SLR  - 10# - 3x10   - Squats - kettlebell goblet 35# - 3x10   - Single leg shuttle squats L - 7 bands - 3x10   - Step Ups - 8 inches - 3x10 (not performed today)   - Single leg standing - 5x30s - knee bent (not performed today)   - elevated single leg bridge (shoulders on 18 inch box) - 2x10 B   - Y-balance test - see results in objective section      therapeutic activities to improve functional performance for 0  minutes, including:              - none performed today     gait training to improve functional mobility and safety for 0  minutes, including:              - none performed today    Patient Education and Home Exercises     Home Exercises Provided and Patient Education Provided     Education provided:   - importance of consistency with HEP  - role of physical therapy for this condition    Written Home Exercises Provided: Patient instructed to cont prior HEP. Exercises were reviewed and Mike was able to demonstrate them prior to the end of the session.  Mike demonstrated good  understanding of the education provided. See EMR under Patient Instructions for exercises provided during therapy sessions    ASSESSMENT     Patient tolerated today's treatment well. Step-down test results indicate continued quadriceps endurance and strength deficits on operative side. He will benefit from continued physical therapy care.    Mike Is progressing well towards his goals.   Pt prognosis is Good.     Pt will continue to benefit from skilled outpatient physical therapy to address the deficits listed in the problem list box on initial evaluation, provide pt/family education and to maximize pt's level of independence in the home and community environment.     Pt's spiritual, cultural and educational needs considered and pt agreeable to plan of care and goals.     Anticipated barriers to physical therapy:  none    Goals:  Short-Term Goals: 6 weeks   - The patient will be independent with initial home exercise program. (Met, 3/27/23)  - The patient will increase strength to at least 3+/5 to perform functional mobility including gait and sit to stand (Met, 3/27/23)  - The patient will increase knee ROM grossly to 0-90 degrees to perform ADLs with pain < 5/10. (Met, 3/27/23)     Long-Term Goals: 12 weeks  - Pt to achieve <25% limitation as measured by the FOTO to demonstrate decreased disability.   - The patient will be independent amb with no assistive device on all surfaces for community distances. (Met, 3/27/23)  - The patient will increase strength to at least 4+/5 to perform functional mobility including gait, stairs.(Progressing)  - The patient will increase knee ROM grossly to WFL to perform all ADLs with pain < 3/10.(Progressing)    PLAN     Plan of care Certification: 1/25/2023 to 4/25/23.     Outpatient Physical Therapy 2 times weekly for 12 weeks to include the following interventions: Manual Therapy, Moist Heat/ Ice, Neuromuscular Re-ed, Patient Education, Therapeutic Activities, Therapeutic Exercise, Gait Training, Electrical Stimulation PRN, and Dry needling PRN.     This patient had a ligamentous repair in his L knee. I expect he will require 6-9 months of physical therapy to safely return to his prior level of function to allow for restored strength and adequate tissue healing.    Elliott Hartley, PT

## 2023-03-29 ENCOUNTER — CLINICAL SUPPORT (OUTPATIENT)
Dept: REHABILITATION | Facility: HOSPITAL | Age: 33
End: 2023-03-29
Payer: COMMERCIAL

## 2023-03-29 DIAGNOSIS — R29.898 DECREASED STRENGTH OF LOWER EXTREMITY: Primary | ICD-10-CM

## 2023-03-29 DIAGNOSIS — M25.662 DECREASED RANGE OF MOTION (ROM) OF LEFT KNEE: ICD-10-CM

## 2023-03-29 DIAGNOSIS — Z74.09 DECREASED FUNCTIONAL MOBILITY AND ENDURANCE: ICD-10-CM

## 2023-03-29 PROCEDURE — 97110 THERAPEUTIC EXERCISES: CPT | Mod: PN

## 2023-03-29 PROCEDURE — 97112 NEUROMUSCULAR REEDUCATION: CPT | Mod: PN

## 2023-03-29 PROCEDURE — 97140 MANUAL THERAPY 1/> REGIONS: CPT | Mod: PN

## 2023-03-29 NOTE — PROGRESS NOTES
[facility]   Physical Therapy Treatment Note     Name: Mike Aburto  Clinic Number: [id number]    Therapy Diagnosis:   Encounter Diagnoses   Name Primary?    Decreased strength of lower extremity Yes    Decreased range of motion (ROM) of left knee     Decreased functional mobility and endurance          Physician: No ref. provider found - Dr. Danial Ribera    Visit Date: 3/29/2023    Physician Orders: PT Eval and Treat   Medical Diagnosis from Referral: Injury to multiple structures of left knee [S89.92XA]  Evaluation Date: 1/25/2023  Authorization Period Expiration: 1/26/23  Plan of Care Expiration: 4/25/23  Progress Note Due: 4/25/23  Visit # / Visits authorized: 26  FOTO: Not performed    PTA Visit #: 0/5     Postoperative Plan:  ACL with meniscus repair protocol    Time In: 1:40 PM  Time Out: 2:30 PM  Total Billable Time: 50 minutes    SUBJECTIVE     Pt reports: he is doing well today. He reports his knee has been bothering him less.  He was compliant with home exercise program.  Response to previous treatment: no notable change  Functional change: no notable change    Pain: 7/10  Location: left knee     OBJECTIVE     To be updated at progress evaluations unless otherwise specified.    Treatment     Mike received the treatments listed below:       therapeutic exercises to develop strength, endurance, ROM, flexibility, posture, and core stabilization for 10 minutes including:  - Upright Bike - 5 minutes              - L gastroc stretch on wedge - 2 minutes              - Standing Hamstring stretch - 2 minutes     manual therapy techniques:  for 8 minutes, including:              - knee PROM to improve flexion and extension range of motion              - patella mobilizations     neuromuscular re-education activities to improve: Balance, Coordination, Kinesthetic, Sense, Proprioception, and Posture for 32 minutes. The following activities were included:               - SAQ  - 10# -  3x10              - LAQ in seated  - 10# - 3x10              - SLR  - 10# - 3x10   - Squats - kettlebell goblet 35# - 3x10   - Single leg shuttle squats L - 8 bands - 3x10   - Step Ups - 12 inches - 3x10   - Single leg standing - 5x30s - knee bent   - elevated single leg bridge (shoulders on 18 inch box) - 2x10 B      therapeutic activities to improve functional performance for 0  minutes, including:              - none performed today     gait training to improve functional mobility and safety for 0  minutes, including:              - none performed today    Patient Education and Home Exercises     Home Exercises Provided and Patient Education Provided     Education provided:   - importance of consistency with HEP  - role of physical therapy for this condition    Written Home Exercises Provided: Patient instructed to cont prior HEP. Exercises were reviewed and Mike was able to demonstrate them prior to the end of the session.  Mike demonstrated good  understanding of the education provided. See EMR under Patient Instructions for exercises provided during therapy sessions    ASSESSMENT     Patient tolerated today's treatment well. He demonstrates improved muscular strength and endurance as well as improved neuromuscular control. He will benefit form continued care.    Mike Is progressing well towards his goals.   Pt prognosis is Good.     Pt will continue to benefit from skilled outpatient physical therapy to address the deficits listed in the problem list box on initial evaluation, provide pt/family education and to maximize pt's level of independence in the home and community environment.     Pt's spiritual, cultural and educational needs considered and pt agreeable to plan of care and goals.     Anticipated barriers to physical therapy: none    Goals:  Short-Term Goals: 6 weeks   - The patient will be independent with initial home exercise program. (Met, 3/27/23)  - The patient will increase strength to at  least 3+/5 to perform functional mobility including gait and sit to stand (Met, 3/27/23)  - The patient will increase knee ROM grossly to 0-90 degrees to perform ADLs with pain < 5/10. (Met, 3/27/23)     Long-Term Goals: 12 weeks  - Pt to achieve <25% limitation as measured by the FOTO to demonstrate decreased disability.   - The patient will be independent amb with no assistive device on all surfaces for community distances. (Met, 3/27/23)  - The patient will increase strength to at least 4+/5 to perform functional mobility including gait, stairs.(Progressing)  - The patient will increase knee ROM grossly to WFL to perform all ADLs with pain < 3/10.(Progressing)    PLAN     Plan of care Certification: 1/25/2023 to 4/25/23.     Outpatient Physical Therapy 2 times weekly for 12 weeks to include the following interventions: Manual Therapy, Moist Heat/ Ice, Neuromuscular Re-ed, Patient Education, Therapeutic Activities, Therapeutic Exercise, Gait Training, Electrical Stimulation PRN, and Dry needling PRN.     This patient had a ligamentous repair in his L knee. I expect he will require 6-9 months of physical therapy to safely return to his prior level of function to allow for restored strength and adequate tissue healing.    Elliott Hartley, PT

## 2023-04-11 ENCOUNTER — CLINICAL SUPPORT (OUTPATIENT)
Dept: REHABILITATION | Facility: HOSPITAL | Age: 33
End: 2023-04-11
Payer: COMMERCIAL

## 2023-04-11 DIAGNOSIS — M25.662 DECREASED RANGE OF MOTION (ROM) OF LEFT KNEE: ICD-10-CM

## 2023-04-11 DIAGNOSIS — R29.898 DECREASED STRENGTH OF LOWER EXTREMITY: Primary | ICD-10-CM

## 2023-04-11 DIAGNOSIS — Z74.09 DECREASED FUNCTIONAL MOBILITY AND ENDURANCE: ICD-10-CM

## 2023-04-11 PROCEDURE — 97110 THERAPEUTIC EXERCISES: CPT | Mod: PN

## 2023-04-11 PROCEDURE — 97112 NEUROMUSCULAR REEDUCATION: CPT | Mod: PN

## 2023-04-11 NOTE — PROGRESS NOTES
[facility]   Physical Therapy Treatment Note     Name: Mike Aburto  Clinic Number: [id number]    Therapy Diagnosis:   Encounter Diagnoses   Name Primary?    Decreased strength of lower extremity Yes    Decreased range of motion (ROM) of left knee     Decreased functional mobility and endurance        Physician: Danial Ribera MD - Dr. Danial Ribera    Visit Date: 4/11/2023    Physician Orders: PT Eval and Treat   Medical Diagnosis from Referral: Injury to multiple structures of left knee [S89.92XA]  Evaluation Date: 1/25/2023  Authorization Period Expiration: 1/26/23  Plan of Care Expiration: 4/25/23  Progress Note Due: 4/25/23  Visit # / Visits authorized: 15/24  FOTO: Not performed    PTA Visit #: 0/5     Postoperative Plan:  ACL with meniscus repair protocol    Time In: 3:00 PM (patient arrived 30 minutes late)  Time Out: 3:45 PM  Total Billable Time: 45 minutes    SUBJECTIVE     Pt reports: he is doing well today. He reports his knee has been bothering him less.  He was compliant with home exercise program.  Response to previous treatment: no notable change  Functional change: no notable change    Pain: 7/10  Location: left knee     OBJECTIVE     To be updated at progress evaluations unless otherwise specified.    Treatment     Mike received the treatments listed below:       therapeutic exercises to develop strength, endurance, ROM, flexibility, posture, and core stabilization for 9 minutes including:  - Upright Bike - 5 minutes              - L gastroc stretch on wedge - 2 minutes              - Standing Hamstring stretch - 2 minutes     manual therapy techniques:  for 0 minutes, including:              - none performed today     neuromuscular re-education activities to improve: Balance, Coordination, Kinesthetic, Sense, Proprioception, and Posture for 31 minutes. The following activities were included:               - SAQ  - 10# - 3x10              - LAQ in seated  - 10# -  3x10              - SLR  - 10# - 3x10   - Squats - kettlebell goblet 35# - 3x10   - Single leg shuttle squats L - 8 bands - 3x10   - Step Ups - 12 inches - 3x10   - Single leg standing - 5x30s - knee bent   - elevated single leg bridge (shoulders on 18 inch box) - 2x10 B      therapeutic activities to improve functional performance for 0  minutes, including:              - none performed today     gait training to improve functional mobility and safety for 0  minutes, including:              - none performed today    Patient Education and Home Exercises     Home Exercises Provided and Patient Education Provided     Education provided:   - importance of consistency with HEP  - role of physical therapy for this condition    Written Home Exercises Provided: Patient instructed to cont prior HEP. Exercises were reviewed and Mike was able to demonstrate them prior to the end of the session.  Mike demonstrated good  understanding of the education provided. See EMR under Patient Instructions for exercises provided during therapy sessions    ASSESSMENT     Patient tolerated today's treatment well. He is showing improvement in strength and neuromuscular control.    Mike Is progressing well towards his goals.   Pt prognosis is Good.     Pt will continue to benefit from skilled outpatient physical therapy to address the deficits listed in the problem list box on initial evaluation, provide pt/family education and to maximize pt's level of independence in the home and community environment.     Pt's spiritual, cultural and educational needs considered and pt agreeable to plan of care and goals.     Anticipated barriers to physical therapy: none    Goals:  Short-Term Goals: 6 weeks   - The patient will be independent with initial home exercise program. (Met, 3/27/23)  - The patient will increase strength to at least 3+/5 to perform functional mobility including gait and sit to stand (Met, 3/27/23)  - The patient will  increase knee ROM grossly to 0-90 degrees to perform ADLs with pain < 5/10. (Met, 3/27/23)     Long-Term Goals: 12 weeks  - Pt to achieve <25% limitation as measured by the FOTO to demonstrate decreased disability.   - The patient will be independent amb with no assistive device on all surfaces for community distances. (Met, 3/27/23)  - The patient will increase strength to at least 4+/5 to perform functional mobility including gait, stairs.(Progressing)  - The patient will increase knee ROM grossly to WFL to perform all ADLs with pain < 3/10.(Progressing)    PLAN     Plan of care Certification: 1/25/2023 to 4/25/23.     Outpatient Physical Therapy 2 times weekly for 12 weeks to include the following interventions: Manual Therapy, Moist Heat/ Ice, Neuromuscular Re-ed, Patient Education, Therapeutic Activities, Therapeutic Exercise, Gait Training, Electrical Stimulation PRN, and Dry needling PRN.     This patient had a ligamentous repair in his L knee. I expect he will require 6-9 months of physical therapy to safely return to his prior level of function to allow for restored strength and adequate tissue healing.    Elliott Hartley, PT

## 2023-04-18 ENCOUNTER — CLINICAL SUPPORT (OUTPATIENT)
Dept: REHABILITATION | Facility: HOSPITAL | Age: 33
End: 2023-04-18
Payer: COMMERCIAL

## 2023-04-18 DIAGNOSIS — M25.662 DECREASED RANGE OF MOTION (ROM) OF LEFT KNEE: ICD-10-CM

## 2023-04-18 DIAGNOSIS — R29.898 DECREASED STRENGTH OF LOWER EXTREMITY: Primary | ICD-10-CM

## 2023-04-18 DIAGNOSIS — Z74.09 DECREASED FUNCTIONAL MOBILITY AND ENDURANCE: ICD-10-CM

## 2023-04-18 PROCEDURE — 97110 THERAPEUTIC EXERCISES: CPT | Mod: PN,CQ

## 2023-04-18 PROCEDURE — 97112 NEUROMUSCULAR REEDUCATION: CPT | Mod: PN,CQ

## 2023-04-18 NOTE — PROGRESS NOTES
[facility]   Physical Therapy Treatment Note     Name: Mike Aburto  Clinic Number: [id number]    Therapy Diagnosis:   Encounter Diagnoses   Name Primary?    Decreased strength of lower extremity Yes    Decreased range of motion (ROM) of left knee     Decreased functional mobility and endurance        Physician: Danial Ribera MD - Dr. Danial Ribera    Visit Date: 4/18/2023    Physician Orders: PT Eval and Treat   Medical Diagnosis from Referral: Injury to multiple structures of left knee [S89.92XA]  Evaluation Date: 1/25/2023  Authorization Period Expiration: 1/26/23  Plan of Care Expiration: 4/25/23  Progress Note Due: 4/25/23  Visit # / Visits authorized: 16/24  FOTO: Not performed    PTA Visit #: 1/5     Postoperative Plan:  ACL with meniscus repair protocol    Time In: 12:35 PM  Time Out: 1:20 PM  Total Billable Time: 45 minutes    SUBJECTIVE     Pt reports: the knee is continuing to feel alright, just weak  He was compliant with home exercise program.  Response to previous treatment: no notable change  Functional change: no notable change    Pain: 5/10  Location: left knee     OBJECTIVE     To be updated at progress evaluations unless otherwise specified.    Treatment     Mike received the treatments listed below:   (Exercises performed today in [facility])     therapeutic exercises to develop strength, endurance, ROM, flexibility, posture, and core stabilization for 10 minutes including:  - Upright Bike - 5 minutes              - L gastroc stretch on wedge - 2 minutes              - Standing Hamstring stretch - 2 minutes     manual therapy techniques:  for 0 minutes, including:              - none performed today     neuromuscular re-education activities to improve: Balance, Coordination, Kinesthetic, Sense, Proprioception, and Posture for 35 minutes. The following activities were included:               - SAQ  - 10# - 3x10              - LAQ in seated  - 10# - 3x10               - SLR  - 10# - 3x10   - *Squats - kettlebell goblet 40# - 3x10   - Single leg shuttle squats L - 8 bands - 2x10   - Step Ups (with hold) - 12 inches - 2x10   - *Single leg standing - 3x30s - knee bent   - *SL RDL 15# KB  3x10   - elevated single leg bridge (shoulders on 18 inch box) - 2x10 B    *=superset today    therapeutic activities to improve functional performance for 0 minutes, including:              - none performed today     gait training to improve functional mobility and safety for 0 minutes, including:              - none performed today    Patient Education and Home Exercises     Home Exercises Provided and Patient Education Provided     Education provided:   - importance of consistency with HEP  - role of physical therapy for this condition    Written Home Exercises Provided: Patient instructed to cont prior HEP. Exercises were reviewed and Mike was able to demonstrate them prior to the end of the session.  Mike demonstrated good  understanding of the education provided. See EMR under Patient Instructions for exercises provided during therapy sessions    ASSESSMENT   Continued to focus on improving LE strength and stability throughout today's treatment session. Added in SL RDL's and added UE movement to SL balance activity today to continue to progress SL stability, Patient tolerating session well overall, demonstrating appropriate fatigue. Continued education provided on proper form and continued progressions/limitations throughout session, as well as importance of HEP/activities outside of therapy. Patient demonstrates good understanding. Plan to continue to progress to tolerance.     Mike Is progressing well towards his goals.   Pt prognosis is Good.     Pt will continue to benefit from skilled outpatient physical therapy to address the deficits listed in the problem list box on initial evaluation, provide pt/family education and to maximize pt's level of independence in the home and  community environment.     Pt's spiritual, cultural and educational needs considered and pt agreeable to plan of care and goals.     Anticipated barriers to physical therapy: none    Goals:  Short-Term Goals: 6 weeks   - The patient will be independent with initial home exercise program. (Met, 3/27/23)  - The patient will increase strength to at least 3+/5 to perform functional mobility including gait and sit to stand (Met, 3/27/23)  - The patient will increase knee ROM grossly to 0-90 degrees to perform ADLs with pain < 5/10. (Met, 3/27/23)     Long-Term Goals: 12 weeks  - Pt to achieve <25% limitation as measured by the FOTO to demonstrate decreased disability.   - The patient will be independent amb with no assistive device on all surfaces for community distances. (Met, 3/27/23)  - The patient will increase strength to at least 4+/5 to perform functional mobility including gait, stairs.(Progressing)  - The patient will increase knee ROM grossly to WFL to perform all ADLs with pain < 3/10.(Progressing)    PLAN     Plan of care Certification: 1/25/2023 to 4/25/23.     Outpatient Physical Therapy 2 times weekly for 12 weeks to include the following interventions: Manual Therapy, Moist Heat/ Ice, Neuromuscular Re-ed, Patient Education, Therapeutic Activities, Therapeutic Exercise, Gait Training, Electrical Stimulation PRN, and Dry needling PRN.     This patient had a ligamentous repair in his L knee. I expect he will require 6-9 months of physical therapy to safely return to his prior level of function to allow for restored strength and adequate tissue healing.    Andria Castro, PTA    4/18/2023

## 2023-04-20 ENCOUNTER — PATIENT MESSAGE (OUTPATIENT)
Dept: ORTHOPEDICS | Facility: CLINIC | Age: 33
End: 2023-04-20

## 2023-04-20 ENCOUNTER — OFFICE VISIT (OUTPATIENT)
Dept: ORTHOPEDICS | Facility: CLINIC | Age: 33
End: 2023-04-20
Payer: COMMERCIAL

## 2023-04-20 VITALS — HEIGHT: 66 IN | BODY MASS INDEX: 37.14 KG/M2 | WEIGHT: 231.06 LBS

## 2023-04-20 DIAGNOSIS — Z98.890 POST-OPERATIVE STATE: Primary | ICD-10-CM

## 2023-04-20 DIAGNOSIS — Z98.890 S/P ACL RECONSTRUCTION: ICD-10-CM

## 2023-04-20 DIAGNOSIS — S83.242D TEAR OF MEDIAL MENISCUS OF LEFT KNEE, CURRENT, UNSPECIFIED TEAR TYPE, SUBSEQUENT ENCOUNTER: ICD-10-CM

## 2023-04-20 DIAGNOSIS — S83.282D TEAR OF LATERAL MENISCUS OF LEFT KNEE, CURRENT, UNSPECIFIED TEAR TYPE, SUBSEQUENT ENCOUNTER: ICD-10-CM

## 2023-04-20 PROCEDURE — 3008F BODY MASS INDEX DOCD: CPT | Mod: CPTII,S$GLB,, | Performed by: ORTHOPAEDIC SURGERY

## 2023-04-20 PROCEDURE — 99999 PR PBB SHADOW E&M-EST. PATIENT-LVL III: CPT | Mod: PBBFAC,,, | Performed by: ORTHOPAEDIC SURGERY

## 2023-04-20 PROCEDURE — 99999 PR PBB SHADOW E&M-EST. PATIENT-LVL III: ICD-10-PCS | Mod: PBBFAC,,, | Performed by: ORTHOPAEDIC SURGERY

## 2023-04-20 PROCEDURE — 1159F PR MEDICATION LIST DOCUMENTED IN MEDICAL RECORD: ICD-10-PCS | Mod: CPTII,S$GLB,, | Performed by: ORTHOPAEDIC SURGERY

## 2023-04-20 PROCEDURE — 99024 POSTOP FOLLOW-UP VISIT: CPT | Mod: S$GLB,,, | Performed by: ORTHOPAEDIC SURGERY

## 2023-04-20 PROCEDURE — 3008F PR BODY MASS INDEX (BMI) DOCUMENTED: ICD-10-PCS | Mod: CPTII,S$GLB,, | Performed by: ORTHOPAEDIC SURGERY

## 2023-04-20 PROCEDURE — 1159F MED LIST DOCD IN RCRD: CPT | Mod: CPTII,S$GLB,, | Performed by: ORTHOPAEDIC SURGERY

## 2023-04-20 PROCEDURE — 99024 PR POST-OP FOLLOW-UP VISIT: ICD-10-PCS | Mod: S$GLB,,, | Performed by: ORTHOPAEDIC SURGERY

## 2023-04-20 NOTE — PATIENT INSTRUCTIONS
Assessment:  Mike Aburto is a  32 y.o. male    with a chief complaint of Post-op Evaluation of the Left Knee  12 weeks status post L Knee ACL recon w/ quad auto, MMR, LMR, bone spur excision on 1/24/23  Post-op    Encounter Diagnoses   Name Primary?    Post-operative state Yes    S/P ACL reconstruction     Tear of lateral meniscus of left knee, current, unspecified tear type, subsequent encounter     Tear of medial meniscus of left knee, current, unspecified tear type, subsequent encounter         Plan:  Continue therapy with Elliott at [facility] per ACL repair and mensicus   Continue acl protocol    Follow-up: 3 months or sooner if there are any problems between now and then.    Leave Review:   Google: Leave Google Review  Healthgrades: Leave Healthgrades Review    After Hours Number: [phone]

## 2023-04-20 NOTE — PROGRESS NOTES
Patient ID: Mike Aburto  YOB: 1990  MRN: [id number]    Chief Complaint: Post-op Evaluation of the Left Knee      Referred By: Established patient    History of Present Illness: Mike Aburto is a  32 y.o. male    with a chief complaint of Post-op Evaluation of the Left Knee      Mike Aburto presents today 12 weeks status post  L Knee ACL recon w/ quad auto, MMR, LMR, bone spur excision on 1/24/23. She presents FWB without brace/assistive devices. The patient has started physical therapy at [facility] with Elliott. Overall there are no issues or concerns.     Past Medical History:   Past Medical History:   Diagnosis Date    Allergy     Seasonal allergies      Past Surgical History:   Procedure Laterality Date    ARTHROSCOPY,KNEE,WITH MENISCUS REPAIR Left 1/24/2023    Procedure: ARTHROSCOPY,KNEE,WITH MENISCUS REPAIR;  Surgeon: Danial Riebra MD;  Location: [facility];  Service: Orthopedics;  Laterality: Left;  medial and lateral repair    FRACTURE SURGERY      left lower arm    HAND SURGERY Right 2011    RECONSTRUCTION OF ANTERIOR CRUCIATE LIGAMENT USING GRAFT Left 1/24/2023    Procedure: RECONSTRUCTION, KNEE, ACL, USING GRAFT;  Surgeon: Danial Ribera MD;  Location: [facility];  Service: Orthopedics;  Laterality: Left;  Left knee arthroscopy, anterior cruciate ligament reconstruction with quadriceps tendon autograft    TONSILLECTOMY       Family History   Problem Relation Age of Onset    No Known Problems Mother     No Known Problems Father     Cancer Maternal Uncle     Cancer Maternal Grandfather     Diabetes Paternal Grandmother      Social History     Socioeconomic History    Marital status: Single   Tobacco Use    Smoking status: Every Day     Packs/day: 0.50     Types: Cigarettes    Smokeless tobacco: Never   Substance and Sexual Activity    Alcohol use: Yes     Comment: occassionally    Drug use: Never    Sexual activity: Yes     Partners: Female     Medication List with  Changes/Refills   Current Medications    ASPIRIN (ECOTRIN) 325 MG EC TABLET    Take 1 tablet (325 mg total) by mouth once daily. Complete full 6 weeks    DEXTROAMPHETAMINE-AMPHETAMINE 10 MG TAB    Take 1 tablet (10 mg total) by mouth once daily.    DOCUSATE SODIUM (COLACE) 100 MG CAPSULE    Take 1 capsule (100 mg total) by mouth 2 (two) times daily.    ERGOCALCIFEROL (ERGOCALCIFEROL) 50,000 UNIT CAP    Take by mouth.    GABAPENTIN (NEURONTIN) 600 MG TABLET    Take 1 tablet (600 mg total) by mouth 3 (three) times daily.    ONDANSETRON (ZOFRAN) 4 MG TABLET    Take 1 tablet (4 mg total) by mouth every 8 (eight) hours as needed for Nausea.    OXYCODONE-ACETAMINOPHEN (PERCOCET) 5-325 MG PER TABLET    Take 1 tablet by mouth every 4 to 6 hours as needed for Pain.    PHENTERMINE (ADIPEX-P) 37.5 MG TABLET    Take 37.5 mg by mouth.     Review of patient's allergies indicates:  No Known Allergies  ROS    Physical Exam:   Body mass index is 37.29 kg/m².  There were no vitals filed for this visit.   GENERAL: Well appearing, appropriate for stated age, no acute distress.  CARDIOVASCULAR: Pulses regular by peripheral palpation.  PULMONARY: Respirations are even and non-labored.  NEURO: Awake, alert, and oriented x 3.  PSYCH: Mood & affect are appropriate.  HEENT: Head is normocephalic and atraumatic.    Ortho/SPM Exam   Knee Exam  Sutures removed, incision sites clean dry and intact, no signs of infection  Minimal effusion   Knee ROM full extension to 130  1a lachman  Stable to v/v at 0/30  Neg a/p drawer  Calf soft NT  All compartments are soft and compressible. Calf soft non-tender. Intact EHL, FHL, gastrocsoleus, and tibialis anterior. Sensation intact to light touch in superficial peroneal, deep peroneal, tibial, sural, and saphenous nerve distributions. Foot warm and well perfused with capillary refill of less than 2 seconds and palpable pedal pulses.       Imaging:   XR Results:  Results for orders placed during the hospital  encounter of 02/06/23    X-Ray Knee 1 or 2 View Left    Narrative  EXAMINATION:  Left knee two views    CLINICAL HISTORY:  Knee pain post ACL construction    COMPARISON:  None    FINDINGS:  Bones are intact without fracture or dislocation.  Surgical changes consistent with ACL repair.  Joint fluid suspected..    Impression  Positive joint effusion and surgical changes consistent with ACL repair.      Electronically signed by: Alfred Panda MD  Date:    02/06/2023  Time:    13:50            Relevant imaging results reviewed and interpreted by me, discussed with the patient and / or family today.      Patient Instructions   Assessment:  Mike Aburto is a  32 y.o. male    with a chief complaint of Post-op Evaluation of the Left Knee  12 weeks status post L Knee ACL recon w/ quad auto, MMR, LMR, bone spur excision on 1/24/23  Post-op    Encounter Diagnoses   Name Primary?    Post-operative state Yes    S/P ACL reconstruction     Tear of lateral meniscus of left knee, current, unspecified tear type, subsequent encounter     Tear of medial meniscus of left knee, current, unspecified tear type, subsequent encounter         Plan:  Continue therapy with Elliott at [facility] per ACL repair and mensicus   Continue acl protocol    Follow-up: 3 months or sooner if there are any problems between now and then.    Leave Review:   Google: Leave Google Review  Healthgrades: Leave Healthgrades Review    After Hours Number: [phone]      Provider Note/Medical Decision Making:       I discussed worrisome and red flag signs and symptoms with the patient. The patient expressed understanding and agreed to alert me immediately or to go to the emergency room if they experience any of these.   Treatment plan was developed with input from the patient/family, and they expressed understanding and agreement with the plan. All questions were answered today.                 Danial Ribera MD  Orthopaedic Surgery & Sports  Medicine     Disclaimer: This note was prepared using a voice recognition system and is likely to have sound alike errors within the text.

## 2023-04-24 ENCOUNTER — CLINICAL SUPPORT (OUTPATIENT)
Dept: REHABILITATION | Facility: HOSPITAL | Age: 33
End: 2023-04-24
Payer: COMMERCIAL

## 2023-04-24 DIAGNOSIS — M25.662 DECREASED RANGE OF MOTION (ROM) OF LEFT KNEE: ICD-10-CM

## 2023-04-24 DIAGNOSIS — Z74.09 DECREASED FUNCTIONAL MOBILITY AND ENDURANCE: ICD-10-CM

## 2023-04-24 DIAGNOSIS — R29.898 DECREASED STRENGTH OF LOWER EXTREMITY: Primary | ICD-10-CM

## 2023-04-24 PROCEDURE — 97110 THERAPEUTIC EXERCISES: CPT | Mod: PN

## 2023-04-24 PROCEDURE — 97112 NEUROMUSCULAR REEDUCATION: CPT | Mod: PN

## 2023-04-24 NOTE — PROGRESS NOTES
[facility]   Physical Therapy Treatment Note     Name: Mike Aburto  Clinic Number: [id number]    Therapy Diagnosis:   Encounter Diagnoses   Name Primary?    Decreased strength of lower extremity Yes    Decreased range of motion (ROM) of left knee     Decreased functional mobility and endurance        Physician: Danial Ribera MD - Dr. Danial Ribera    Visit Date: 4/24/2023    Physician Orders: PT Eval and Treat   Medical Diagnosis from Referral: Injury to multiple structures of left knee [S89.92XA]  Evaluation Date: 1/25/2023  Authorization Period Expiration: 1/26/23  Plan of Care Expiration: 4/25/23  Progress Note Due: 4/25/23  Visit # / Visits authorized: 17/24  FOTO: Not performed    PTA Visit #: 1/5     Postoperative Plan:  ACL with meniscus repair protocol    Time In: 4:30 PM (patient arrived 30 minutes late)  Time Out: 4:50 PM  Total Billable Time: 20 minutes    SUBJECTIVE     Pt reports: he has been doing well. He reports he has not had any knee pain.  He was compliant with home exercise program.  Response to previous treatment: no notable change  Functional change: no notable change    Pain: 5/10  Location: left knee     OBJECTIVE     To be updated at progress evaluations unless otherwise specified.    Treatment     Mike received the treatments listed below:      therapeutic exercises to develop strength, endurance, ROM, flexibility, posture, and core stabilization for 10 minutes including:  - Upright Bike - 5 minutes (not performed today)              - L gastroc stretch on wedge - 2 minutes  (not performed today)              - Standing Hamstring stretch - 2 minutes  (not performed today)   - Matrix Knee Extension - 25# - 3x10   - Cable Prone Knee Extension - 80# - 3x10     manual therapy techniques:  for 0 minutes, including:              - none performed today     neuromuscular re-education activities to improve: Balance, Coordination, Kinesthetic, Sense,  Proprioception, and Posture for 10 minutes. The following activities were including:  - Single leg shuttle squats L - 8 bands - 2x10 (not performed today)   - Step Ups (with hold) - 12 inches - 2x10   - SL RDL -  3x10   - elevated single leg bridge (shoulders on 18 inch box) - 2x10 B   - Single leg squat - 3x10    therapeutic activities to improve functional performance for 0 minutes, including:              - none performed today     gait training to improve functional mobility and safety for 0 minutes, including:              - none performed today    Patient Education and Home Exercises     Home Exercises Provided and Patient Education Provided     Education provided:   - importance of consistency with HEP  - role of physical therapy for this condition    Written Home Exercises Provided: Patient instructed to cont prior HEP. Exercises were reviewed and Mike was able to demonstrate them prior to the end of the session.  Mike demonstrated good  understanding of the education provided. See EMR under Patient Instructions for exercises provided during therapy sessions    ASSESSMENT   This patient is consistently tardy to appointments. Despite poor therapy compliance, he displays improved quad strength and neuromuscular control. He will benefit from continued care with improved compliance.    Mike Is progressing well towards his goals.   Pt prognosis is Good.     Pt will continue to benefit from skilled outpatient physical therapy to address the deficits listed in the problem list box on initial evaluation, provide pt/family education and to maximize pt's level of independence in the home and community environment.     Pt's spiritual, cultural and educational needs considered and pt agreeable to plan of care and goals.     Anticipated barriers to physical therapy: none    Goals:  Short-Term Goals: 6 weeks   - The patient will be independent with initial home exercise program. (Met, 3/27/23)  - The patient will  increase strength to at least 3+/5 to perform functional mobility including gait and sit to stand (Met, 3/27/23)  - The patient will increase knee ROM grossly to 0-90 degrees to perform ADLs with pain < 5/10. (Met, 3/27/23)     Long-Term Goals: 12 weeks  - Pt to achieve <25% limitation as measured by the FOTO to demonstrate decreased disability.   - The patient will be independent amb with no assistive device on all surfaces for community distances. (Met, 3/27/23)  - The patient will increase strength to at least 4+/5 to perform functional mobility including gait, stairs.(Progressing)  - The patient will increase knee ROM grossly to WFL to perform all ADLs with pain < 3/10.(Progressing)    PLAN     Plan of care Certification: 1/25/2023 to 4/25/23.     Outpatient Physical Therapy 2 times weekly for 12 weeks to include the following interventions: Manual Therapy, Moist Heat/ Ice, Neuromuscular Re-ed, Patient Education, Therapeutic Activities, Therapeutic Exercise, Gait Training, Electrical Stimulation PRN, and Dry needling PRN.     This patient had a ligamentous repair in his L knee. I expect he will require 6-9 months of physical therapy to safely return to his prior level of function to allow for restored strength and adequate tissue healing.    Elliott Hartley, PT    4/24/2023

## 2023-04-27 NOTE — PROGRESS NOTES
Contacted patient with results and recommendations per Dr. Melody Canela. She verbalized understanding. She states she is back to work and would like to proceed with scheduling of cardiac MRI. Order in [place] [id number]. Scheduling pending authorization. See updated plan of care and daily treatment note for 5/1/23.    Elliott Hartley, PT, DPT  Physical Therapist  Board-Certified Specialist in Orthopaedic and Sports Physical Therapy  5/1/2023

## 2023-05-01 ENCOUNTER — CLINICAL SUPPORT (OUTPATIENT)
Dept: REHABILITATION | Facility: HOSPITAL | Age: 33
End: 2023-05-01
Payer: COMMERCIAL

## 2023-05-01 DIAGNOSIS — M25.662 DECREASED RANGE OF MOTION (ROM) OF LEFT KNEE: ICD-10-CM

## 2023-05-01 DIAGNOSIS — R29.898 DECREASED STRENGTH OF LOWER EXTREMITY: Primary | ICD-10-CM

## 2023-05-01 DIAGNOSIS — Z74.09 DECREASED FUNCTIONAL MOBILITY AND ENDURANCE: ICD-10-CM

## 2023-05-01 PROCEDURE — 97530 THERAPEUTIC ACTIVITIES: CPT | Mod: PN

## 2023-05-01 NOTE — PLAN OF CARE
[facility]   Physical Therapy Treatment Note     Name: Mike Aburto  Clinic Number: [id number]    Therapy Diagnosis:   Encounter Diagnoses   Name Primary?    Decreased strength of lower extremity Yes    Decreased range of motion (ROM) of left knee     Decreased functional mobility and endurance          Physician: Danial Ribera MD    Visit Date: 5/1/2023    Physician Orders: PT Eval and Treat   Medical Diagnosis from Referral: Injury to multiple structures of left knee [S89.92XA]  Evaluation Date: 1/25/2023  Authorization Period Expiration: 1/26/23  Plan of Care Expiration: 7/1/23  Progress Note Due: 6/1/23  Visit # / Visits authorized: 18/24  FOTO: Not performed    PTA Visit #: 0/5     Postoperative Plan:  ACL with meniscus repair protocol    Time In:  3:15 PM  Time Out: 4:00 PM  Total Billable Time: 45 minutes    SUBJECTIVE     Pt reports: he is doing well today. He reports he is having some difficulty getting to P.T. appointments as he recently moved to [place], [place]. He would like to transfer clinics to a location closer to his home. I recommended [facility] [place] [facility] which was satisfactory to him.  He was compliant with home exercise program.  Response to previous treatment: no notable change  Functional change: no notable change    Pain: 5/10  Location: left knee     OBJECTIVE       5/1/23:    Single Leg Step Down Test (45 seconds at 6 inches):   L: 21 reps   R: 39 reps    Single Leg Squat Test (45 seconds at 20 inches)   L:  8 reps   R: 24 reps    Y balance:   L Anterior: 58 cm   R Anterior: 64 cm     L PM: 106 cm   R PM: 109 cm     L PL: 90 cm   R PL: 107 cm    Quad Dynamometry at 60 degrees knee flexion:   L:  66.7   R: 139.1    Treatment     Mike received the treatments listed below:        therapeutic activities to improve functional performance for 45 minutes, including:              - Functional testing as indicated in objective section:    - Single leg step down  test    - single leg squat test    - Y-balance test    - Quad dynamometry at 60 degrees knee flexion      Patient Education and Home Exercises     Home Exercises Provided and Patient Education Provided     Education provided:   - importance of consistency with HEP  - role of physical therapy for this condition    Written Home Exercises Provided: Patient instructed to cont prior HEP. Exercises were reviewed and Mike was able to demonstrate them prior to the end of the session.  Mike demonstrated good  understanding of the education provided. See EMR under Patient Instructions for exercises provided during therapy sessions    ASSESSMENT   Patient demonstrates improving but still very limited quad strength and functional control. Quad dynamometry is less than 50% compared to contralateral limb. Mike will need continued formal physical therapy to improve his strength and functional ability as he continues to work toward returning to work tasks.    Mike Is progressing well towards his goals.   Pt prognosis is Good.     Pt will continue to benefit from skilled outpatient physical therapy to address the deficits listed in the problem list box on initial evaluation, provide pt/family education and to maximize pt's level of independence in the home and community environment.     Pt's spiritual, cultural and educational needs considered and pt agreeable to plan of care and goals.     Anticipated barriers to physical therapy: none    Goals:  Short-Term Goals: 6 weeks   - The patient will be independent with initial home exercise program. (Met, 3/27/23)  - The patient will increase strength to at least 3+/5 to perform functional mobility including gait and sit to stand (Met, 3/27/23)  - The patient will increase knee ROM grossly to 0-90 degrees to perform ADLs with pain < 5/10. (Met, 3/27/23)     Long-Term Goals: 12 weeks  - Pt to achieve <25% limitation as measured by the FOTO to demonstrate decreased disability.   -  The patient will be independent amb with no assistive device on all surfaces for community distances. (Met, 3/27/23)  - The patient will increase strength to at least 4+/5 to perform functional mobility including gait, stairs.(Met, 5/1/23)  - The patient will increase knee ROM grossly to WFL to perform all ADLs with pain < 3/10.(Met, 5/1/23)    Final Goals: 30-36 weeks  - Patient to demonstrate 95% or better on quad dynamometry to demonstrate satisfactory quadriceps strength.  - Patient to demonstrate 95% or better on Samantha Hop Testing to demonstrate satisfactory strength and functional control.    PLAN     Plan of care Certification: 1/25/2023 to 7/1/23.    Mike to transfer to [facility] in [place] for continued physical therapy due to that clinic's closer proximity to his home after his recent move.     Outpatient Physical Therapy 2 times weekly for 12 weeks to include the following interventions: Manual Therapy, Moist Heat/ Ice, Neuromuscular Re-ed, Patient Education, Therapeutic Activities, Therapeutic Exercise, Gait Training, Electrical Stimulation PRN, and Dry needling PRN.     This patient had a ligamentous repair in his L knee. I expect he will require 6-9 months of physical therapy to safely return to his prior level of function to allow for restored strength and adequate tissue healing.    Elliott Hartley, PT    5/1/2023

## 2023-05-03 ENCOUNTER — CLINICAL SUPPORT (OUTPATIENT)
Dept: REHABILITATION | Facility: HOSPITAL | Age: 33
End: 2023-05-03
Payer: COMMERCIAL

## 2023-05-03 DIAGNOSIS — Z74.09 DECREASED FUNCTIONAL MOBILITY AND ENDURANCE: ICD-10-CM

## 2023-05-03 DIAGNOSIS — R29.898 DECREASED STRENGTH OF LOWER EXTREMITY: Primary | ICD-10-CM

## 2023-05-03 DIAGNOSIS — M25.662 DECREASED RANGE OF MOTION (ROM) OF LEFT KNEE: ICD-10-CM

## 2023-05-03 PROCEDURE — 97112 NEUROMUSCULAR REEDUCATION: CPT | Mod: PN

## 2023-05-03 NOTE — PROGRESS NOTES
[facility]   Physical Therapy Treatment Note     Name: Mike Aburto  [facility] Number: [id number]    Therapy Diagnosis:   Encounter Diagnoses   Name Primary?    Decreased strength of lower extremity Yes    Decreased range of motion (ROM) of left knee     Decreased functional mobility and endurance      Physician: Danial Ribera MD    Visit Date: 5/3/2023      Physician Orders: PT Eval and Treat   Medical Diagnosis from Referral: Injury to multiple structures of left knee [S89.92XA]  Evaluation Date: 1/25/2023  Authorization Period Expiration: 1/26/23  Plan of Care Expiration: 7/1/23  Progress Note Due: 6/1/23  Visit # / Visits authorized: 19/24  FOTO: Not performed     PTA Visit #: 0/5      Postoperative Plan:  ACL with meniscus repair protocol      Time In: 3:15 PM  Time Out: 3:40 PM  Total Billable Time: 25 minutes    SUBJECTIVE     Pt reports: he is doing well today.  He denies any new pain or concerns today. He reports he is considering staying at the [facility] rather than switching to the [facility] despite needing to drive 1.5 hours for each appointment.  He was compliant with home exercise program.  Response to previous treatment: no notable change  Functional change: no notable change    Pain: 2/10  Location: left knee     OBJECTIVE     Objective Measures updated at progress report unless specified.     Treatment     Mike received the treatments listed below:      therapeutic exercises to develop strength, endurance, ROM, flexibility, posture, and core stabilization for 5 minutes including:  - Upright Bike - 5 minutes              - Matrix Knee Extension - 25# - 3x10 (not performed today)              - Cable Prone Knee Extension - 80# - 3x10 (not performed today)     manual therapy techniques:  for 0 minutes, including:              - none performed today     neuromuscular re-education activities to improve: Balance, Coordination, Kinesthetic, Sense,  Proprioception, and Posture for 20 minutes. The following activities were including:              - Step Ups (with hold) - 12 inches - 2x10 (not performed today)              - SL RDL -  3x10              - elevated single leg bridge (shoulders on 18 inch box) - 2x10 B              - Single leg squat - 3x10     therapeutic activities to improve functional performance for 0 minutes, including:              - none performed today     gait training to improve functional mobility and safety for 0 minutes, including:              - none performed today      Patient Education and Home Exercises     Home Exercises Provided and Patient Education Provided     Education provided:   - importance of consistency with HEP  - role of physical therapy for this condition      Written Home Exercises Provided: Patient instructed to cont prior HEP. Exercises were reviewed and Mike was able to demonstrate them prior to the end of the session.  Mike demonstrated good  understanding of the education provided. See EMR under Patient Instructions for exercises provided during therapy sessions    ASSESSMENT     Treatment session duration shortened today due to patient leaving clinic during session. The activities he did participate in he performed well with, demonstrating improved neuromuscular control with CKC activity. Currently, his plan is to remain in care at the [facility].    Mike Is progressing well towards his goals.   Pt prognosis is Good.     Pt will continue to benefit from skilled outpatient physical therapy to address the deficits listed in the problem list box on initial evaluation, provide pt/family education and to maximize pt's level of independence in the home and community environment.     Pt's spiritual, cultural and educational needs considered and pt agreeable to plan of care and goals.     Anticipated barriers to physical therapy: none    Goals:  Short-Term Goals: 6 weeks   - The patient will be independent  with initial home exercise program. (Met, 3/27/23)  - The patient will increase strength to at least 3+/5 to perform functional mobility including gait and sit to stand (Met, 3/27/23)  - The patient will increase knee ROM grossly to 0-90 degrees to perform ADLs with pain < 5/10. (Met, 3/27/23)     Long-Term Goals: 12 weeks  - Pt to achieve <25% limitation as measured by the FOTO to demonstrate decreased disability.   - The patient will be independent amb with no assistive device on all surfaces for community distances. (Met, 3/27/23)  - The patient will increase strength to at least 4+/5 to perform functional mobility including gait, stairs.(Met, 5/1/23)  - The patient will increase knee ROM grossly to WFL to perform all ADLs with pain < 3/10.(Met, 5/1/23)     Final Goals: 30-36 weeks  - Patient to demonstrate 95% or better on quad dynamometry to demonstrate satisfactory quadriceps strength.  - Patient to demonstrate 95% or better on Samantha Hop Testing to demonstrate satisfactory strength and functional control.    PLAN     Continue per plan of care.    Plan of care Certification: 1/25/2023 to 7/1/23.     Outpatient Physical Therapy 2 times weekly for 12 weeks to include the following interventions: Manual Therapy, Moist Heat/ Ice, Neuromuscular Re-ed, Patient Education, Therapeutic Activities, Therapeutic Exercise, Gait Training, Electrical Stimulation PRN, and Dry needling PRN.      This patient had a ligamentous repair in his L knee. I expect he will require 6-9 months of physical therapy to safely return to his prior level of function to allow for restored strength and adequate tissue healing.    Elliott Hartley, PT

## 2023-05-08 ENCOUNTER — CLINICAL SUPPORT (OUTPATIENT)
Dept: REHABILITATION | Facility: HOSPITAL | Age: 33
End: 2023-05-08
Payer: COMMERCIAL

## 2023-05-08 DIAGNOSIS — R29.898 DECREASED STRENGTH OF LOWER EXTREMITY: Primary | ICD-10-CM

## 2023-05-08 DIAGNOSIS — Z74.09 DECREASED FUNCTIONAL MOBILITY AND ENDURANCE: ICD-10-CM

## 2023-05-08 DIAGNOSIS — M25.662 DECREASED RANGE OF MOTION (ROM) OF LEFT KNEE: ICD-10-CM

## 2023-05-08 PROCEDURE — 97112 NEUROMUSCULAR REEDUCATION: CPT | Mod: PN

## 2023-05-08 PROCEDURE — 97110 THERAPEUTIC EXERCISES: CPT | Mod: PN

## 2023-05-08 NOTE — PROGRESS NOTES
[facility]   Physical Therapy Treatment Note     Name: Mike Aburto  Clinic Number: [id number]    Therapy Diagnosis:   Encounter Diagnoses   Name Primary?    Decreased strength of lower extremity Yes    Decreased range of motion (ROM) of left knee     Decreased functional mobility and endurance        Physician: Danial Ribera MD    Visit Date: 5/8/2023      Physician Orders: PT Eval and Treat   Medical Diagnosis from Referral: Injury to multiple structures of left knee [S89.92XA]  Evaluation Date: 1/25/2023  Authorization Period Expiration: 1/26/23  Plan of Care Expiration: 7/1/23  Progress Note Due: 6/1/23  Visit # / Visits authorized: 20/24  FOTO: Not performed     PTA Visit #: 0/5      Postoperative Plan:  ACL with meniscus repair protocol      Time In: 1:45 PM  Time Out: 2:30 PM  Total Billable Time: 45 minutes    SUBJECTIVE     Pt reports: he is doing well today. He reports he would like to continue his physical therapy with us in [place].  He was compliant with home exercise program.  Response to previous treatment: no notable change  Functional change: no notable change    Pain: 2/10  Location: left knee     OBJECTIVE     Objective Measures updated at progress report unless specified.     Treatment     Mike received the treatments listed below:      therapeutic exercises to develop strength, endurance, ROM, flexibility, posture, and core stabilization for 30 minutes including:  - Upright Bike - 5 minutes              - Matrix Knee Extension - 30# - 3x10              - Cable Prone Knee Extension - 100# - 3x10    - Treadmill Retro walking - 3 x 45 seconds  - Sled Pull 100# 3 laps     manual therapy techniques:  for 0 minutes, including:              - none performed today     neuromuscular re-education activities to improve: Balance, Coordination, Kinesthetic, Sense, Proprioception, and Posture for 15 minutes. The following activities were including:              - Step  Ups (with hold) - 12 inches - 3x10              - SL RDL -  3x10              - elevated single leg bridge (shoulders on 18 inch box) - 2x10 B              - Single leg squat - 3x10     therapeutic activities to improve functional performance for 0 minutes, including:              - none performed today     gait training to improve functional mobility and safety for 0 minutes, including:              - none performed today      Patient Education and Home Exercises     Home Exercises Provided and Patient Education Provided     Education provided:   - importance of consistency with HEP  - role of physical therapy for this condition      Written Home Exercises Provided: Patient instructed to cont prior HEP. Exercises were reviewed and Mike was able to demonstrate them prior to the end of the session.  Mike demonstrated good  understanding of the education provided. See EMR under Patient Instructions for exercises provided during therapy sessions    ASSESSMENT     Patient tolerated today's treatment well. He still has difficulty with single leg squatting activity. He is progressing and will benefit from continued care.    Mike Is progressing well towards his goals.   Pt prognosis is Good.     Pt will continue to benefit from skilled outpatient physical therapy to address the deficits listed in the problem list box on initial evaluation, provide pt/family education and to maximize pt's level of independence in the home and community environment.     Pt's spiritual, cultural and educational needs considered and pt agreeable to plan of care and goals.     Anticipated barriers to physical therapy: none    Goals:  Short-Term Goals: 6 weeks   - The patient will be independent with initial home exercise program. (Met, 3/27/23)  - The patient will increase strength to at least 3+/5 to perform functional mobility including gait and sit to stand (Met, 3/27/23)  - The patient will increase knee ROM grossly to 0-90 degrees to  perform ADLs with pain < 5/10. (Met, 3/27/23)     Long-Term Goals: 12 weeks  - Pt to achieve <25% limitation as measured by the FOTO to demonstrate decreased disability.   - The patient will be independent amb with no assistive device on all surfaces for community distances. (Met, 3/27/23)  - The patient will increase strength to at least 4+/5 to perform functional mobility including gait, stairs.(Met, 5/1/23)  - The patient will increase knee ROM grossly to WFL to perform all ADLs with pain < 3/10.(Met, 5/1/23)     Final Goals: 30-36 weeks  - Patient to demonstrate 95% or better on quad dynamometry to demonstrate satisfactory quadriceps strength.  - Patient to demonstrate 95% or better on Samantha Hop Testing to demonstrate satisfactory strength and functional control.    PLAN     Continue per plan of care.    Plan of care Certification: 1/25/2023 to 7/1/23.     Outpatient Physical Therapy 2 times weekly for 12 weeks to include the following interventions: Manual Therapy, Moist Heat/ Ice, Neuromuscular Re-ed, Patient Education, Therapeutic Activities, Therapeutic Exercise, Gait Training, Electrical Stimulation PRN, and Dry needling PRN.      This patient had a ligamentous repair in his L knee. I expect he will require 6-9 months of physical therapy to safely return to his prior level of function to allow for restored strength and adequate tissue healing.    Elliott Hartley, PT

## 2023-05-10 ENCOUNTER — CLINICAL SUPPORT (OUTPATIENT)
Dept: REHABILITATION | Facility: HOSPITAL | Age: 33
End: 2023-05-10
Payer: COMMERCIAL

## 2023-05-10 DIAGNOSIS — Z74.09 DECREASED FUNCTIONAL MOBILITY AND ENDURANCE: ICD-10-CM

## 2023-05-10 DIAGNOSIS — R29.898 DECREASED STRENGTH OF LOWER EXTREMITY: Primary | ICD-10-CM

## 2023-05-10 DIAGNOSIS — M25.662 DECREASED RANGE OF MOTION (ROM) OF LEFT KNEE: ICD-10-CM

## 2023-05-10 PROCEDURE — 97140 MANUAL THERAPY 1/> REGIONS: CPT | Mod: PN

## 2023-05-10 PROCEDURE — 97110 THERAPEUTIC EXERCISES: CPT | Mod: PN

## 2023-05-10 NOTE — PROGRESS NOTES
[facility]   Physical Therapy Treatment Note     Name: Mike Aburto  Clinic Number: [id number]    Therapy Diagnosis:   Encounter Diagnoses   Name Primary?    Decreased strength of lower extremity Yes    Decreased range of motion (ROM) of left knee     Decreased functional mobility and endurance          Physician: Danial Ribera MD    Visit Date: 5/10/2023      Physician Orders: PT Eval and Treat   Medical Diagnosis from Referral: Injury to multiple structures of left knee [S89.92XA]  Evaluation Date: 1/25/2023  Authorization Period Expiration: 1/26/23  Plan of Care Expiration: 7/1/23  Progress Note Due: 6/1/23  Visit # / Visits authorized: 21/24  FOTO: Not performed     PTA Visit #: 0/5      Postoperative Plan:  ACL with meniscus repair protocol      Time In: 1:55 PM (patient arrived late)  Time Out: 2:40 PM  Total Billable Time: 45 minutes    SUBJECTIVE     Pt reports: he had some mild soreness after his last visit but that he has no new complaints today.  He was compliant with home exercise program.  Response to previous treatment: no notable change  Functional change: no notable change    Pain: 2/10  Location: left knee     OBJECTIVE     Objective Measures updated at progress report unless specified.     Treatment     Mike received the treatments listed below:      therapeutic exercises to develop strength, endurance, ROM, flexibility, posture, and core stabilization for 30 minutes including:  - Upright Bike - 5 minutes              - Matrix Knee Extension - 30# - 3x10   - Matrix Leg Press - 70# - 3x10              - Cable Prone Knee Extension - 100# - 3x10    - Treadmill Retro walking - 3 x 45 seconds  - Sled Pull 100# 3 laps     manual therapy techniques:  for 0 minutes, including:              - none performed today     neuromuscular re-education activities to improve: Balance, Coordination, Kinesthetic, Sense, Proprioception, and Posture for 15 minutes. The following  activities were including:              - Step Ups (with hold) - 12 inches - 3x10              - SL RDL -  3x10              - elevated single leg bridge (shoulders on 18 inch box) - 2x10 B              - Single leg squat - 3x10     therapeutic activities to improve functional performance for 0 minutes, including:              - none performed today     gait training to improve functional mobility and safety for 0 minutes, including:              - none performed today      Patient Education and Home Exercises     Home Exercises Provided and Patient Education Provided     Education provided:   - importance of consistency with HEP  - role of physical therapy for this condition      Written Home Exercises Provided: Patient instructed to cont prior HEP. Exercises were reviewed and Mike was able to demonstrate them prior to the end of the session.  Mike demonstrated good  understanding of the education provided. See EMR under Patient Instructions for exercises provided during therapy sessions    ASSESSMENT     Patient tolerated today's treatment well. He demonstrates improving quad strength neuromuscular control. He will benefit from continued physical therapy care.    Mike Is progressing well towards his goals.   Pt prognosis is Good.     Pt will continue to benefit from skilled outpatient physical therapy to address the deficits listed in the problem list box on initial evaluation, provide pt/family education and to maximize pt's level of independence in the home and community environment.     Pt's spiritual, cultural and educational needs considered and pt agreeable to plan of care and goals.     Anticipated barriers to physical therapy: none    Goals:  Short-Term Goals: 6 weeks   - The patient will be independent with initial home exercise program. (Met, 3/27/23)  - The patient will increase strength to at least 3+/5 to perform functional mobility including gait and sit to stand (Met, 3/27/23)  - The patient  will increase knee ROM grossly to 0-90 degrees to perform ADLs with pain < 5/10. (Met, 3/27/23)     Long-Term Goals: 12 weeks  - Pt to achieve <25% limitation as measured by the FOTO to demonstrate decreased disability.   - The patient will be independent amb with no assistive device on all surfaces for community distances. (Met, 3/27/23)  - The patient will increase strength to at least 4+/5 to perform functional mobility including gait, stairs.(Met, 5/1/23)  - The patient will increase knee ROM grossly to WFL to perform all ADLs with pain < 3/10.(Met, 5/1/23)     Final Goals: 30-36 weeks  - Patient to demonstrate 95% or better on quad dynamometry to demonstrate satisfactory quadriceps strength.  - Patient to demonstrate 95% or better on Samantha Hop Testing to demonstrate satisfactory strength and functional control.    PLAN     Continue per plan of care.    Plan of care Certification: 1/25/2023 to 7/1/23.     Outpatient Physical Therapy 2 times weekly for 12 weeks to include the following interventions: Manual Therapy, Moist Heat/ Ice, Neuromuscular Re-ed, Patient Education, Therapeutic Activities, Therapeutic Exercise, Gait Training, Electrical Stimulation PRN, and Dry needling PRN.      This patient had a ligamentous repair in his L knee. I expect he will require 6-9 months of physical therapy to safely return to his prior level of function to allow for restored strength and adequate tissue healing.    Elliott Hartley, PT

## 2023-05-15 ENCOUNTER — CLINICAL SUPPORT (OUTPATIENT)
Dept: REHABILITATION | Facility: HOSPITAL | Age: 33
End: 2023-05-15
Payer: COMMERCIAL

## 2023-05-15 DIAGNOSIS — R29.898 DECREASED STRENGTH OF LOWER EXTREMITY: Primary | ICD-10-CM

## 2023-05-15 DIAGNOSIS — Z74.09 DECREASED FUNCTIONAL MOBILITY AND ENDURANCE: ICD-10-CM

## 2023-05-15 DIAGNOSIS — M25.662 DECREASED RANGE OF MOTION (ROM) OF LEFT KNEE: ICD-10-CM

## 2023-05-15 PROCEDURE — 97110 THERAPEUTIC EXERCISES: CPT | Mod: PN

## 2023-05-15 PROCEDURE — 97112 NEUROMUSCULAR REEDUCATION: CPT | Mod: PN

## 2023-05-15 NOTE — PROGRESS NOTES
[facility]   Physical Therapy Treatment Note     Name: Mike Aburto  Clinic Number: [id number]    Therapy Diagnosis:   Encounter Diagnoses   Name Primary?    Decreased strength of lower extremity Yes    Decreased range of motion (ROM) of left knee     Decreased functional mobility and endurance            Physician: Danial Ribera MD    Visit Date: 5/15/2023      Physician Orders: PT Eval and Treat   Medical Diagnosis from Referral: Injury to multiple structures of left knee [S89.92XA]  Evaluation Date: 1/25/2023  Authorization Period Expiration: 1/26/23  Plan of Care Expiration: 7/1/23  Progress Note Due: 6/1/23  Visit # / Visits authorized: 21/24  FOTO: Not performed     PTA Visit #: 0/5      Postoperative Plan:  ACL with meniscus repair protocol      Time In: 3:30 PM (patient arrived late)  Time Out: 4:30 PM  Total Billable Time: 60 minutes    SUBJECTIVE     Pt reports: he had some mild soreness after his last visit but that he has no new complaints today.  He was compliant with home exercise program.  Response to previous treatment: no notable change  Functional change: no notable change    Pain: 2/10  Location: left knee     OBJECTIVE     Objective Measures updated at progress report unless specified.     Treatment     Mike received the treatments listed below:      therapeutic exercises to develop strength, endurance, ROM, flexibility, posture, and core stabilization for 25 minutes including:  - Upright Bike - 5 minutes              - Matrix Knee Extension - 35# - 3x10   - Matrix Leg Press - 75# - 3x10              - Cable Prone Knee Extension - 100# - 3x10    - Treadmill Retro walking - 3 x 45 seconds  - Sled Pull 100# 3 laps     manual therapy techniques:  for 0 minutes, including:              - none performed today     neuromuscular re-education activities to improve: Balance, Coordination, Kinesthetic, Sense, Proprioception, and Posture for 25 minutes. The following  activities were including:              - Step Ups (with hold) - 12 inches - 3x10              - SL RDL -  3x10              - elevated single leg bridge (shoulders on 18 inch box) - 2x10 B              - Single leg squat - 3x10     therapeutic activities to improve functional performance for 0 minutes, including:              - none performed today     gait training to improve functional mobility and safety for 0 minutes, including:              - none performed today      Patient Education and Home Exercises     Home Exercises Provided and Patient Education Provided     Education provided:   - importance of consistency with HEP  - role of physical therapy for this condition      Written Home Exercises Provided: Patient instructed to cont prior HEP. Exercises were reviewed and Mike was able to demonstrate them prior to the end of the session.  Mike demonstrated good  understanding of the education provided. See EMR under Patient Instructions for exercises provided during therapy sessions    ASSESSMENT     Patient tolerated today's treatment well. He displayed quad fatigue by the conclusion of the session. He does appear to be having some quad tendonosis and patellofemoral pain today. He will benefit from continued care.    Mike Is progressing well towards his goals.   Pt prognosis is Good.     Pt will continue to benefit from skilled outpatient physical therapy to address the deficits listed in the problem list box on initial evaluation, provide pt/family education and to maximize pt's level of independence in the home and community environment.     Pt's spiritual, cultural and educational needs considered and pt agreeable to plan of care and goals.     Anticipated barriers to physical therapy: none    Goals:  Short-Term Goals: 6 weeks   - The patient will be independent with initial home exercise program. (Met, 3/27/23)  - The patient will increase strength to at least 3+/5 to perform functional mobility  including gait and sit to stand (Met, 3/27/23)  - The patient will increase knee ROM grossly to 0-90 degrees to perform ADLs with pain < 5/10. (Met, 3/27/23)     Long-Term Goals: 12 weeks  - Pt to achieve <25% limitation as measured by the FOTO to demonstrate decreased disability.   - The patient will be independent amb with no assistive device on all surfaces for community distances. (Met, 3/27/23)  - The patient will increase strength to at least 4+/5 to perform functional mobility including gait, stairs.(Met, 5/1/23)  - The patient will increase knee ROM grossly to WFL to perform all ADLs with pain < 3/10.(Met, 5/1/23)     Final Goals: 30-36 weeks  - Patient to demonstrate 95% or better on quad dynamometry to demonstrate satisfactory quadriceps strength.  - Patient to demonstrate 95% or better on Samantha Hop Testing to demonstrate satisfactory strength and functional control.    PLAN     Continue per plan of care.    Plan of care Certification: 1/25/2023 to 7/1/23.     Outpatient Physical Therapy 2 times weekly for 12 weeks to include the following interventions: Manual Therapy, Moist Heat/ Ice, Neuromuscular Re-ed, Patient Education, Therapeutic Activities, Therapeutic Exercise, Gait Training, Electrical Stimulation PRN, and Dry needling PRN.      This patient had a ligamentous repair in his L knee. I expect he will require 6-9 months of physical therapy to safely return to his prior level of function to allow for restored strength and adequate tissue healing.    Elliott Hartley, PT

## 2023-05-18 ENCOUNTER — CLINICAL SUPPORT (OUTPATIENT)
Dept: REHABILITATION | Facility: HOSPITAL | Age: 33
End: 2023-05-18
Payer: COMMERCIAL

## 2023-05-18 DIAGNOSIS — Z74.09 DECREASED FUNCTIONAL MOBILITY AND ENDURANCE: ICD-10-CM

## 2023-05-18 DIAGNOSIS — M25.662 DECREASED RANGE OF MOTION (ROM) OF LEFT KNEE: ICD-10-CM

## 2023-05-18 DIAGNOSIS — R29.898 DECREASED STRENGTH OF LOWER EXTREMITY: Primary | ICD-10-CM

## 2023-05-18 PROCEDURE — 97112 NEUROMUSCULAR REEDUCATION: CPT | Mod: PN

## 2023-05-18 PROCEDURE — 97110 THERAPEUTIC EXERCISES: CPT | Mod: PN

## 2023-05-18 NOTE — PROGRESS NOTES
[facility]   Physical Therapy Treatment Note     Name: Mike Aburto  Clinic Number: [id number]    Therapy Diagnosis:   Encounter Diagnoses   Name Primary?    Decreased strength of lower extremity Yes    Decreased range of motion (ROM) of left knee     Decreased functional mobility and endurance        Physician: Danial Ribera MD    Visit Date: 5/18/2023      Physician Orders: PT Eval and Treat   Medical Diagnosis from Referral: Injury to multiple structures of left knee [S89.92XA]  Evaluation Date: 1/25/2023  Authorization Period Expiration: 1/26/23  Plan of Care Expiration: 7/1/23  Progress Note Due: 6/1/23  Visit # / Visits authorized: 23/24  FOTO: Not performed     PTA Visit #: 0/5      Postoperative Plan:  ACL with meniscus repair protocol      Time In: 3:15 PM  Time Out: 4:05 PM  Total Billable Time: 50 minutes    SUBJECTIVE     Pt reports: he is doing well today. He reports he is not having much knee pain. He reports he has started working out on his own at the gym and that it's going well.  He was compliant with home exercise program.  Response to previous treatment: no notable change  Functional change: no notable change    Pain: 2/10  Location: left knee     OBJECTIVE     Objective Measures updated at progress report unless specified.     Treatment     Mike received the treatments listed below:      therapeutic exercises to develop strength, endurance, ROM, flexibility, posture, and core stabilization for 25 minutes including:  - Upright Bike - 5 minutes              - Matrix Knee Extension - 35# - 3x10   - Matrix Leg Press - 100# - 3x10              - Cable Prone Knee Extension - 100# - 3x10    - Treadmill Retro walking - 3 x 45 seconds (not performed today)  - Sled Pull 110# 3 laps     manual therapy techniques:  for 0 minutes, including:              - none performed today     neuromuscular re-education activities to improve: Balance, Coordination, Kinesthetic,  Sense, Proprioception, and Posture for 25 minutes. The following activities were including:              - Step Ups (with hold) - 12 inches - 3x10              - SL RDL -  3x10              - elevated single leg bridge (shoulders on 18 inch box) - 2x10 B              - Single leg squat at 24 inch box - 3x10     therapeutic activities to improve functional performance for 0 minutes, including:              - none performed today     gait training to improve functional mobility and safety for 0 minutes, including:              - none performed today      Patient Education and Home Exercises     Home Exercises Provided and Patient Education Provided     Education provided:   - importance of consistency with HEP  - role of physical therapy for this condition      Written Home Exercises Provided: Patient instructed to cont prior HEP. Exercises were reviewed and Mike was able to demonstrate them prior to the end of the session.  Mike demonstrated good  understanding of the education provided. See EMR under Patient Instructions for exercises provided during therapy sessions    ASSESSMENT     Patient with notable improvement in quad strength today; he was able to advance resistance on multiple exercises with no ill effects. Quad muscle definition is beginning to return as well. Mike demonstrates good progress.    Mike Is progressing well towards his goals.   Pt prognosis is Good.     Pt will continue to benefit from skilled outpatient physical therapy to address the deficits listed in the problem list box on initial evaluation, provide pt/family education and to maximize pt's level of independence in the home and community environment.     Pt's spiritual, cultural and educational needs considered and pt agreeable to plan of care and goals.     Anticipated barriers to physical therapy: none    Goals:  Short-Term Goals: 6 weeks   - The patient will be independent with initial home exercise program. (Met, 3/27/23)  -  The patient will increase strength to at least 3+/5 to perform functional mobility including gait and sit to stand (Met, 3/27/23)  - The patient will increase knee ROM grossly to 0-90 degrees to perform ADLs with pain < 5/10. (Met, 3/27/23)     Long-Term Goals: 12 weeks  - Pt to achieve <25% limitation as measured by the FOTO to demonstrate decreased disability.   - The patient will be independent amb with no assistive device on all surfaces for community distances. (Met, 3/27/23)  - The patient will increase strength to at least 4+/5 to perform functional mobility including gait, stairs.(Met, 5/1/23)  - The patient will increase knee ROM grossly to WFL to perform all ADLs with pain < 3/10.(Met, 5/1/23)     Final Goals: 30-36 weeks  - Patient to demonstrate 95% or better on quad dynamometry to demonstrate satisfactory quadriceps strength. (Progressing)  - Patient to demonstrate 95% or better on Samantha Hop Testing to demonstrate satisfactory strength and functional control. (Progressing)    PLAN     Continue per plan of care.    Plan of care Certification: 1/25/2023 to 7/1/23.     Outpatient Physical Therapy 2 times weekly for 12 weeks to include the following interventions: Manual Therapy, Moist Heat/ Ice, Neuromuscular Re-ed, Patient Education, Therapeutic Activities, Therapeutic Exercise, Gait Training, Electrical Stimulation PRN, and Dry needling PRN.      This patient had a ligamentous repair in his L knee. I expect he will require 6-9 months of physical therapy to safely return to his prior level of function to allow for restored strength and adequate tissue healing.    Elliott Hartley, PT

## 2023-05-22 ENCOUNTER — CLINICAL SUPPORT (OUTPATIENT)
Dept: REHABILITATION | Facility: HOSPITAL | Age: 33
End: 2023-05-22
Payer: COMMERCIAL

## 2023-05-22 DIAGNOSIS — M25.662 DECREASED RANGE OF MOTION (ROM) OF LEFT KNEE: ICD-10-CM

## 2023-05-22 DIAGNOSIS — Z74.09 DECREASED FUNCTIONAL MOBILITY AND ENDURANCE: ICD-10-CM

## 2023-05-22 DIAGNOSIS — R29.898 DECREASED STRENGTH OF LOWER EXTREMITY: Primary | ICD-10-CM

## 2023-05-22 PROCEDURE — 97110 THERAPEUTIC EXERCISES: CPT | Mod: PN

## 2023-05-22 PROCEDURE — 97112 NEUROMUSCULAR REEDUCATION: CPT | Mod: PN

## 2023-05-22 NOTE — PROGRESS NOTES
[facility]   Physical Therapy Treatment Note     Name: Mike Aubrto  Clinic Number: [id number]    Therapy Diagnosis:   Encounter Diagnoses   Name Primary?    Decreased strength of lower extremity Yes    Decreased range of motion (ROM) of left knee     Decreased functional mobility and endurance      Physician: Danial Ribera MD    Visit Date: 5/22/2023      Physician Orders: PT Eval and Treat   Medical Diagnosis from Referral: Injury to multiple structures of left knee [S89.92XA]  Evaluation Date: 1/25/2023  Authorization Period Expiration: 1/26/23  Plan of Care Expiration: 7/1/23  Progress Note Due: 6/1/23  Visit # / Visits authorized: 24/24  FOTO: Not performed     PTA Visit #: 0/5      Postoperative Plan:  ACL with meniscus repair protocol      Time In: 9:45 AM (patient arrived late)  Time Out: 10:35 AM  Total Billable Time: 50 minutes    SUBJECTIVE     Pt reports: he is having some knee pain today but overall feels like he is getting stronger.  He was compliant with home exercise program.  Response to previous treatment: no notable change  Functional change: no notable change    Pain: 2/10  Location: left knee     OBJECTIVE     Objective Measures updated at progress report unless specified.     Treatment     Mike received the treatments listed below:      therapeutic exercises to develop strength, endurance, ROM, flexibility, posture, and core stabilization for 25 minutes including:  - Upright Bike - 5 minutes              - Matrix Knee Extension - 40# - 3x10   - Matrix Leg Press - 100# - 3x10              - Cable Prone Knee Extension - 140# - 3x10    - Treadmill Retro walking - 3 x 45 seconds (not performed today)  - Sled Pull 110# 3 laps     manual therapy techniques:  for 0 minutes, including:              - none performed today     neuromuscular re-education activities to improve: Balance, Coordination, Kinesthetic, Sense, Proprioception, and Posture for 25 minutes. The  following activities were including:              - Step Ups (with hold) - 12 inches - 3x10              - SL RDL -  3x10              - elevated single leg bridge (shoulders on 18 inch box) - 2x10 B              - Single leg squat at 24 inch box - 3x10     therapeutic activities to improve functional performance for 0 minutes, including:              - none performed today     gait training to improve functional mobility and safety for 0 minutes, including:              - none performed today      Patient Education and Home Exercises     Home Exercises Provided and Patient Education Provided     Education provided:   - importance of consistency with HEP  - role of physical therapy for this condition      Written Home Exercises Provided: Patient instructed to cont prior HEP. Exercises were reviewed and Mike was able to demonstrate them prior to the end of the session.  Mike demonstrated good  understanding of the education provided. See EMR under Patient Instructions for exercises provided during therapy sessions    ASSESSMENT     Patient demonstrates good improvement in his quad strength, neuromuscular control, and overall functional ability compared to his initial evaluation. He continues to have strength and functional limitations and will benefit from continued physical therapy care.    Mike Is progressing well towards his goals.   Pt prognosis is Good.     Pt will continue to benefit from skilled outpatient physical therapy to address the deficits listed in the problem list box on initial evaluation, provide pt/family education and to maximize pt's level of independence in the home and community environment.     Pt's spiritual, cultural and educational needs considered and pt agreeable to plan of care and goals.     Anticipated barriers to physical therapy: none    Goals:  Short-Term Goals: 6 weeks   - The patient will be independent with initial home exercise program. (Met, 3/27/23)  - The patient will  increase strength to at least 3+/5 to perform functional mobility including gait and sit to stand (Met, 3/27/23)  - The patient will increase knee ROM grossly to 0-90 degrees to perform ADLs with pain < 5/10. (Met, 3/27/23)     Long-Term Goals: 12 weeks  - Pt to achieve <25% limitation as measured by the FOTO to demonstrate decreased disability.   - The patient will be independent amb with no assistive device on all surfaces for community distances. (Met, 3/27/23)  - The patient will increase strength to at least 4+/5 to perform functional mobility including gait, stairs.(Met, 5/1/23)  - The patient will increase knee ROM grossly to WFL to perform all ADLs with pain < 3/10.(Met, 5/1/23)     Final Goals: 30-36 weeks  - Patient to demonstrate 95% or better on quad dynamometry to demonstrate satisfactory quadriceps strength. (Progressing)  - Patient to demonstrate 95% or better on Samantha Hop Testing to demonstrate satisfactory strength and functional control. (Progressing)    PLAN     Continue per plan of care.    Plan of care Certification: 1/25/2023 to 7/1/23.     Outpatient Physical Therapy 2 times weekly for 12 weeks to include the following interventions: Manual Therapy, Moist Heat/ Ice, Neuromuscular Re-ed, Patient Education, Therapeutic Activities, Therapeutic Exercise, Gait Training, Electrical Stimulation PRN, and Dry needling PRN.      This patient had a ligamentous repair in his L knee. I expect he will require 6-9 months of physical therapy to safely return to his prior level of function to allow for restored strength and adequate tissue healing.    Elliott Hartley, PT

## 2023-05-29 ENCOUNTER — CLINICAL SUPPORT (OUTPATIENT)
Dept: REHABILITATION | Facility: HOSPITAL | Age: 33
End: 2023-05-29
Payer: COMMERCIAL

## 2023-05-29 DIAGNOSIS — R29.898 DECREASED STRENGTH OF LOWER EXTREMITY: Primary | ICD-10-CM

## 2023-05-29 DIAGNOSIS — M25.662 DECREASED RANGE OF MOTION (ROM) OF LEFT KNEE: ICD-10-CM

## 2023-05-29 DIAGNOSIS — Z74.09 DECREASED FUNCTIONAL MOBILITY AND ENDURANCE: ICD-10-CM

## 2023-05-29 PROCEDURE — 97110 THERAPEUTIC EXERCISES: CPT | Mod: PN

## 2023-05-29 PROCEDURE — 97112 NEUROMUSCULAR REEDUCATION: CPT | Mod: PN

## 2023-05-29 NOTE — PROGRESS NOTES
[facility]   Physical Therapy Treatment Note     Name: Mike Aburto  Clinic Number: [id number]    Therapy Diagnosis:   Encounter Diagnoses   Name Primary?    Decreased strength of lower extremity Yes    Decreased range of motion (ROM) of left knee     Decreased functional mobility and endurance        Physician: Danial Ribera MD    Visit Date: 5/29/2023      Physician Orders: PT Eval and Treat   Medical Diagnosis from Referral: Injury to multiple structures of left knee [S89.92XA]  Evaluation Date: 1/25/2023  Authorization Period Expiration: 1/26/23  Plan of Care Expiration: 7/1/23  Progress Note Due: 6/1/23  Visit # / Visits authorized: 25/36  FOTO: Not performed     PTA Visit #: 0/5      Postoperative Plan:  ACL with meniscus repair protocol      Time In: 2:25 PM  Time Out: 3:25 PM  Total Billable Time: 60 minutes    SUBJECTIVE     Pt reports: he is doing well today. He reports he has had some anterior knee pain but otherwise feels like he is doing well.  He was compliant with home exercise program.  Response to previous treatment: no notable change  Functional change: no notable change    Pain: 2/10  Location: left knee     OBJECTIVE     Objective Measures updated at progress report unless specified.     Treatment     Mike received the treatments listed below:      therapeutic exercises to develop strength, endurance, ROM, flexibility, posture, and core stabilization for 30 minutes including:  - Upright Bike - 5 minutes              - Matrix Knee Extension - 40# - 3x10   - Matrix Leg Press - 100# - 3x10              - Cable Prone Knee Extension - 140# - 3x10  - Sled Pull 110# 3 laps     manual therapy techniques:  for 0 minutes, including:              - none performed today     neuromuscular re-education activities to improve: Balance, Coordination, Kinesthetic, Sense, Proprioception, and Posture for 30 minutes. The following activities were including:              - Step  Ups (with hold) - 12 inches - 3x10              - SL RDL -  3x10              - elevated single leg bridge (shoulders on 18 inch box) - 2x10 B              - Single leg squat at 24 inch box - 3x10     therapeutic activities to improve functional performance for 0 minutes, including:              - none performed today     gait training to improve functional mobility and safety for 0 minutes, including:              - none performed today      Patient Education and Home Exercises     Home Exercises Provided and Patient Education Provided     Education provided:   - importance of consistency with HEP  - role of physical therapy for this condition      Written Home Exercises Provided: Patient instructed to cont prior HEP. Exercises were reviewed and Mike was able to demonstrate them prior to the end of the session.  Mike demonstrated good  understanding of the education provided. See EMR under Patient Instructions for exercises provided during therapy sessions    ASSESSMENT     Patient demonstrates improving strength and neuromuscular control. He is progressing appropriately and will benefit from continued care.    Mike Is progressing well towards his goals.   Pt prognosis is Good.     Pt will continue to benefit from skilled outpatient physical therapy to address the deficits listed in the problem list box on initial evaluation, provide pt/family education and to maximize pt's level of independence in the home and community environment.     Pt's spiritual, cultural and educational needs considered and pt agreeable to plan of care and goals.     Anticipated barriers to physical therapy: none    Goals:  Short-Term Goals: 6 weeks   - The patient will be independent with initial home exercise program. (Met, 3/27/23)  - The patient will increase strength to at least 3+/5 to perform functional mobility including gait and sit to stand (Met, 3/27/23)  - The patient will increase knee ROM grossly to 0-90 degrees to  perform ADLs with pain < 5/10. (Met, 3/27/23)     Long-Term Goals: 12 weeks  - Pt to achieve <25% limitation as measured by the FOTO to demonstrate decreased disability.   - The patient will be independent amb with no assistive device on all surfaces for community distances. (Met, 3/27/23)  - The patient will increase strength to at least 4+/5 to perform functional mobility including gait, stairs.(Met, 5/1/23)  - The patient will increase knee ROM grossly to WFL to perform all ADLs with pain < 3/10.(Met, 5/1/23)     Final Goals: 30-36 weeks  - Patient to demonstrate 95% or better on quad dynamometry to demonstrate satisfactory quadriceps strength. (Progressing)  - Patient to demonstrate 95% or better on Samantha Hop Testing to demonstrate satisfactory strength and functional control. (Progressing)    PLAN     Continue per plan of care.    Plan of care Certification: 1/25/2023 to 7/1/23.     Outpatient Physical Therapy 2 times weekly for 12 weeks to include the following interventions: Manual Therapy, Moist Heat/ Ice, Neuromuscular Re-ed, Patient Education, Therapeutic Activities, Therapeutic Exercise, Gait Training, Electrical Stimulation PRN, and Dry needling PRN.      This patient had a ligamentous repair in his L knee. I expect he will require 6-9 months of physical therapy to safely return to his prior level of function to allow for restored strength and adequate tissue healing.    Elliott Hartley, PT

## 2023-06-06 ENCOUNTER — CLINICAL SUPPORT (OUTPATIENT)
Dept: REHABILITATION | Facility: HOSPITAL | Age: 33
End: 2023-06-06
Payer: COMMERCIAL

## 2023-06-06 DIAGNOSIS — M25.662 DECREASED RANGE OF MOTION (ROM) OF LEFT KNEE: ICD-10-CM

## 2023-06-06 DIAGNOSIS — R29.898 DECREASED STRENGTH OF LOWER EXTREMITY: Primary | ICD-10-CM

## 2023-06-06 DIAGNOSIS — Z74.09 DECREASED FUNCTIONAL MOBILITY AND ENDURANCE: ICD-10-CM

## 2023-06-06 PROCEDURE — 97110 THERAPEUTIC EXERCISES: CPT | Mod: PN

## 2023-06-06 PROCEDURE — 97530 THERAPEUTIC ACTIVITIES: CPT | Mod: PN

## 2023-06-06 NOTE — PROGRESS NOTES
See plan of care update for date of service 6/6/23.     Elliott Hartley, PT, DPT  Physical Therapist  Board-Certified Specialist in Orthopaedic and Sports Physical Therapy  6/6/2023

## 2023-06-06 NOTE — PLAN OF CARE
[facility]   Physical Therapy Treatment Note     Name: Mike Aburto  Clinic Number: [id number]    Therapy Diagnosis:   Encounter Diagnoses   Name Primary?    Decreased strength of lower extremity Yes    Decreased range of motion (ROM) of left knee     Decreased functional mobility and endurance          Physician: Danial Ribera MD    Visit Date: 6/6/2023      Physician Orders: PT Eval and Treat   Medical Diagnosis from Referral: Injury to multiple structures of left knee [S89.92XA]  Evaluation Date: 1/25/2023  Authorization Period Expiration: 1/19/24  Plan of Care Expiration: 10/6/23  Progress Note Due: 7/6/23  Visit # / Visits authorized: 26/36  FOTO: Not performed     PTA Visit #: 0/5      Postoperative Plan:  ACL with meniscus repair protocol      Time In: 2:30 PM  Time Out: 3:35 PM  Total Billable Time: 65 minutes    SUBJECTIVE     Pt reports: he is doing well today. He denies any new pain and reports he has consistently been working out on his own.  He was compliant with home exercise program.  Response to previous treatment: no notable change  Functional change: no notable change    Pain: 2/10  Location: left knee     OBJECTIVE     5/1/23 6/6/23     Single Leg Step Down Test (45 seconds at 6 inches):              L: 21 reps  21 reps              R: 39 reps  37 reps     Single Leg Squat Test (45 seconds at 20 inches)              L:  8 reps  15 reps              R: 24 reps  27 reps     Y balance:              L Anterior: 58 cm 58cm              R Anterior: 64 cm 58 cm                 L PM: 106 cm  105 cm              R PM: 109 cm  113 cm                 L PL: 90 cm  97 cm              R PL: 107 cm  108 cm     Quad Dynamometry at 60 degrees knee flexion:              L:  66.7 77.4              R: 139.1 133.4    Treatment     Mike received the treatments listed below:      therapeutic exercises to develop strength, endurance, ROM, flexibility, posture, and core  stabilization for 8 minutes including:  - quad burnout - purple band on knee extension machine x 2    manual therapy techniques:  for 0 minutes, including:              - none performed today     neuromuscular re-education activities to improve: Balance, Coordination, Kinesthetic, Sense, Proprioception, and Posture for 0 minutes. The following activities were including:             - none performed today     therapeutic activities to improve functional performance for 57 minutes, including:              - functional testing as indicated in objective section         Patient Education and Home Exercises     Home Exercises Provided and Patient Education Provided     Education provided:   - importance of consistency with HEP  - role of physical therapy for this condition      Written Home Exercises Provided: Patient instructed to cont prior HEP. Exercises were reviewed and Mike was able to demonstrate them prior to the end of the session.  Mike demonstrated good  understanding of the education provided. See EMR under Patient Instructions for exercises provided during therapy sessions    ASSESSMENT     Patient with improving quad strength and functional control. He still has notable deficits in quad dynamometry but is progressing appropriately. He will benefit from continued care.    Mike Is progressing well towards his goals.   Pt prognosis is Good.     Pt will continue to benefit from skilled outpatient physical therapy to address the deficits listed in the problem list box on initial evaluation, provide pt/family education and to maximize pt's level of independence in the home and community environment.     Pt's spiritual, cultural and educational needs considered and pt agreeable to plan of care and goals.     Anticipated barriers to physical therapy: none    Goals:  Short-Term Goals: 6 weeks   - The patient will be independent with initial home exercise program. (Met, 3/27/23)  - The patient will increase  strength to at least 3+/5 to perform functional mobility including gait and sit to stand (Met, 3/27/23)  - The patient will increase knee ROM grossly to 0-90 degrees to perform ADLs with pain < 5/10. (Met, 3/27/23)     Long-Term Goals: 12 weeks  - Pt to achieve <25% limitation as measured by the FOTO to demonstrate decreased disability.   - The patient will be independent amb with no assistive device on all surfaces for community distances. (Met, 3/27/23)  - The patient will increase strength to at least 4+/5 to perform functional mobility including gait, stairs.(Met, 5/1/23)  - The patient will increase knee ROM grossly to WFL to perform all ADLs with pain < 3/10.(Met, 5/1/23)     Final Goals: 30-36 weeks  - Patient to demonstrate 95% or better on quad dynamometry to demonstrate satisfactory quadriceps strength. (Progressing)  - Patient to demonstrate 95% or better on Samantha Hop Testing to demonstrate satisfactory strength and functional control. (Progressing)    PLAN     Continue per plan of care.    Plan of care Certification: 1/25/2023 to 10/6/23.     Outpatient Physical Therapy 2 times weekly for 12 weeks to include the following interventions: Manual Therapy, Moist Heat/ Ice, Neuromuscular Re-ed, Patient Education, Therapeutic Activities, Therapeutic Exercise, Gait Training, Electrical Stimulation PRN, and Dry needling PRN.      This patient had a ligamentous repair in his L knee. I expect he will require 6-9 months of physical therapy to safely return to his prior level of function to allow for restored strength and adequate tissue healing.    Elliott Hartley, PT, DPT  Physical Therapist  Board-Certified Specialist in Orthopaedic and Sports Physical Therapy  6/6/2023

## 2023-06-12 ENCOUNTER — CLINICAL SUPPORT (OUTPATIENT)
Dept: REHABILITATION | Facility: HOSPITAL | Age: 33
End: 2023-06-12
Payer: COMMERCIAL

## 2023-06-12 DIAGNOSIS — R29.898 DECREASED STRENGTH OF LOWER EXTREMITY: Primary | ICD-10-CM

## 2023-06-12 DIAGNOSIS — M25.662 DECREASED RANGE OF MOTION (ROM) OF LEFT KNEE: ICD-10-CM

## 2023-06-12 DIAGNOSIS — Z74.09 DECREASED FUNCTIONAL MOBILITY AND ENDURANCE: ICD-10-CM

## 2023-06-12 PROCEDURE — 97112 NEUROMUSCULAR REEDUCATION: CPT | Mod: PN

## 2023-06-12 PROCEDURE — 97110 THERAPEUTIC EXERCISES: CPT | Mod: PN

## 2023-06-12 NOTE — PROGRESS NOTES
[facility]   Physical Therapy Treatment Note     Name: Mike Aburto  Clinic Number: [id number]    Therapy Diagnosis:   Encounter Diagnoses   Name Primary?    Decreased strength of lower extremity Yes    Decreased range of motion (ROM) of left knee     Decreased functional mobility and endurance      Physician: Danial Ribera MD    Visit Date: 6/12/2023    Physician Orders: PT Eval and Treat   Medical Diagnosis from Referral: Injury to multiple structures of left knee [S89.92XA]  Evaluation Date: 1/25/2023  Authorization Period Expiration: 1/19/24  Plan of Care Expiration: 10/6/23  Progress Note Due: 7/6/23  Visit # / Visits authorized: 27/36  FOTO: Not performed    PTA Visit #: 0/5     Time In: 3:15 PM  Time Out: 4:50 PM  Total Billable Time: 50 minutes    SUBJECTIVE     Pt reports: he is doing well today. He reports he still notices quad fatigue but otherwise is doing well..  He was compliant with home exercise program.  Response to previous treatment: no notable change  Functional change: no notable change    Pain: 2/10  Location: left knee     OBJECTIVE     Objective Measures updated at progress report unless specified.     Treatment     Mike received the treatments listed below:    therapeutic exercises to develop strength, endurance, ROM, flexibility, posture, and core stabilization for 25 minutes including:  - Upright Bike - 5 minutes              - Matrix Knee Extension - 40# - 3x10              - Matrix Leg Press - 100# - 3x10              - Cable Prone Knee Extension - 140# - 3x10  - Sled Push/Pull 110# 3 laps      manual therapy techniques:  for 0 minutes, including:              - none performed today     neuromuscular re-education activities to improve: Balance, Coordination, Kinesthetic, Sense, Proprioception, and Posture for 25 minutes. The following activities were including:              - Step Ups (with hold) - 18 inches - 3x10              - SL RDL -  3x10               - elevated single leg bridge (shoulders on 18 inch box) - 2x10 B              - Single leg squat at 24 inch box - 3x10     therapeutic activities to improve functional performance for 0 minutes, including:              - none performed today     gait training to improve functional mobility and safety for 0 minutes, including:              - none performed today      Patient Education and Home Exercises     Home Exercises Provided and Patient Education Provided     Education provided:   - importance of consistency with HEP  - role of physical therapy for this condition      Written Home Exercises Provided: Patient instructed to cont prior HEP. Exercises were reviewed and Mike was able to demonstrate them prior to the end of the session.  Mike demonstrated good  understanding of the education provided. See EMR under Patient Instructions for exercises provided during therapy sessions    ASSESSMENT     Mike demonstrates improving strength and neuromuscular control. Improvement noted with step-ups and RDLs with improved valgus control. He is progressing well and will benefit from continued care.    Mike Is progressing well towards his goals.   Pt prognosis is Good.     Pt will continue to benefit from skilled outpatient physical therapy to address the deficits listed in the problem list box on initial evaluation, provide pt/family education and to maximize pt's level of independence in the home and community environment.     Pt's spiritual, cultural and educational needs considered and pt agreeable to plan of care and goals.     Anticipated barriers to physical therapy: none    Goals:  Short-Term Goals: 6 weeks   - The patient will be independent with initial home exercise program. (Met, 3/27/23)  - The patient will increase strength to at least 3+/5 to perform functional mobility including gait and sit to stand (Met, 3/27/23)  - The patient will increase knee ROM grossly to 0-90 degrees to perform ADLs with  pain < 5/10. (Met, 3/27/23)     Long-Term Goals: 12 weeks  - Pt to achieve <25% limitation as measured by the FOTO to demonstrate decreased disability.   - The patient will be independent amb with no assistive device on all surfaces for community distances. (Met, 3/27/23)  - The patient will increase strength to at least 4+/5 to perform functional mobility including gait, stairs.(Met, 5/1/23)  - The patient will increase knee ROM grossly to WFL to perform all ADLs with pain < 3/10.(Met, 5/1/23)     Final Goals: 30-36 weeks  - Patient to demonstrate 95% or better on quad dynamometry to demonstrate satisfactory quadriceps strength. (Progressing)  - Patient to demonstrate 95% or better on Samantha Hop Testing to demonstrate satisfactory strength and functional control. (Progressing)    PLAN     Continue per plan of care.     Plan of care Certification: 1/25/2023 to 10/6/23.     Outpatient Physical Therapy 2 times weekly for 12 weeks to include the following interventions: Manual Therapy, Moist Heat/ Ice, Neuromuscular Re-ed, Patient Education, Therapeutic Activities, Therapeutic Exercise, Gait Training, Electrical Stimulation PRN, and Dry needling PRN.      This patient had a ligamentous repair in his L knee. I expect he will require 6-9 months of physical therapy to safely return to his prior level of function to allow for restored strength and adequate tissue healing.    Elliott Hartley, PT

## 2023-06-13 NOTE — PROGRESS NOTES
[facility]   Physical Therapy Treatment Note     Name: Mike Aburto  Clinic Number: [id number]    Therapy Diagnosis:   Encounter Diagnoses   Name Primary?    Decreased strength of lower extremity Yes    Decreased range of motion (ROM) of left knee     Decreased functional mobility and endurance        Physician: Danial Ribera MD    Visit Date: 6/14/2023    Physician Orders: PT Eval and Treat   Medical Diagnosis from Referral: Injury to multiple structures of left knee [S89.92XA]  Evaluation Date: 1/25/2023  Authorization Period Expiration: 1/19/24  Plan of Care Expiration: 10/6/23  Progress Note Due: 7/6/23  Visit # / Visits authorized: 28/36  FOTO: 6/3 (most recent: 6/14/23)      Date of Surgery: 1/24/23    PTA Visit #: 0/5     Time In: 2:35 PM  Time Out: 3:40 PM  Total Billable Time: 65 minutes    SUBJECTIVE     Pt reports: he is doing well today. He reports he continues to have some knee soreness but feels like he is getting stronger.  He was compliant with home exercise program.  Response to previous treatment: no notable change  Functional change: no notable change    Pain: 2/10  Location: left knee     OBJECTIVE     Objective Measures updated at progress report unless specified.     Treatment     Mike received the treatments listed below:    therapeutic exercises to develop strength, endurance, ROM, flexibility, posture, and core stabilization for 25 minutes including:  - Upright Bike - 5 minutes              - Matrix Knee Extension - 40# - 3x10              - Matrix Leg Press - 100# - 3x10              - Cable Prone Knee Extension - 140# - 3x10  - Sled Push/Pull 120# 3 laps      manual therapy techniques:  for 0 minutes, including:              - none performed today     neuromuscular re-education activities to improve: Balance, Coordination, Kinesthetic, Sense, Proprioception, and Posture for 25 minutes. The following activities were including:              - Step Ups  (with hold) - 18 inches - 3x10              - SL RDL -  3x10              - elevated single leg bridge (shoulders on 18 inch box) - 2x10 B              - Single leg squat at 24 inch box - 3x10     therapeutic activities to improve functional performance for 10 minutes, including:              - line hops fw/bkw - 3x10 on 2 legs              - line hops side/side - 3x10 on 2 legs   - 4 inch box jumps- 1x10 - jump up, step down     gait training to improve functional mobility and safety for 0 minutes, including:              - none performed today      Patient Education and Home Exercises     Home Exercises Provided and Patient Education Provided     Education provided:   - importance of consistency with HEP  - role of physical therapy for this condition      Written Home Exercises Provided: Patient instructed to cont prior HEP. Exercises were reviewed and Mike was able to demonstrate them prior to the end of the session.  Mike demonstrated good  understanding of the education provided. See EMR under Patient Instructions for exercises provided during therapy sessions    ASSESSMENT     Patient tolerated today's treatment well. He demonstrates improving strength, neuromuscular control, and overall function. He will benefit from continued care.    Mike Is progressing well towards his goals.   Pt prognosis is Good.     Pt will continue to benefit from skilled outpatient physical therapy to address the deficits listed in the problem list box on initial evaluation, provide pt/family education and to maximize pt's level of independence in the home and community environment.     Pt's spiritual, cultural and educational needs considered and pt agreeable to plan of care and goals.     Anticipated barriers to physical therapy: none    Goals:  Short-Term Goals: 6 weeks   - The patient will be independent with initial home exercise program. (Met, 3/27/23)  - The patient will increase strength to at least 3+/5 to perform  functional mobility including gait and sit to stand (Met, 3/27/23)  - The patient will increase knee ROM grossly to 0-90 degrees to perform ADLs with pain < 5/10. (Met, 3/27/23)     Long-Term Goals: 12 weeks  - Pt to achieve <25% limitation as measured by the FOTO to demonstrate decreased disability.   - The patient will be independent amb with no assistive device on all surfaces for community distances. (Met, 3/27/23)  - The patient will increase strength to at least 4+/5 to perform functional mobility including gait, stairs.(Met, 5/1/23)  - The patient will increase knee ROM grossly to WFL to perform all ADLs with pain < 3/10.(Met, 5/1/23)     Final Goals: 30-36 weeks  - Patient to demonstrate 95% or better on quad dynamometry to demonstrate satisfactory quadriceps strength. (Progressing)  - Patient to demonstrate 95% or better on Samantha Hop Testing to demonstrate satisfactory strength and functional control. (Progressing)    PLAN     Continue per plan of care.     Plan of care Certification: 1/25/2023 to 10/6/23.     Outpatient Physical Therapy 2 times weekly for 12 weeks to include the following interventions: Manual Therapy, Moist Heat/ Ice, Neuromuscular Re-ed, Patient Education, Therapeutic Activities, Therapeutic Exercise, Gait Training, Electrical Stimulation PRN, and Dry needling PRN.      This patient had a ligamentous repair in his L knee. I expect he will require 6-9 months of physical therapy to safely return to his prior level of function to allow for restored strength and adequate tissue healing.    Elliott Hartley, PT

## 2023-06-14 ENCOUNTER — CLINICAL SUPPORT (OUTPATIENT)
Dept: REHABILITATION | Facility: HOSPITAL | Age: 33
End: 2023-06-14
Payer: COMMERCIAL

## 2023-06-14 DIAGNOSIS — R29.898 DECREASED STRENGTH OF LOWER EXTREMITY: Primary | ICD-10-CM

## 2023-06-14 DIAGNOSIS — Z74.09 DECREASED FUNCTIONAL MOBILITY AND ENDURANCE: ICD-10-CM

## 2023-06-14 DIAGNOSIS — M25.662 DECREASED RANGE OF MOTION (ROM) OF LEFT KNEE: ICD-10-CM

## 2023-06-14 PROCEDURE — 97112 NEUROMUSCULAR REEDUCATION: CPT | Mod: PN

## 2023-06-14 PROCEDURE — 97530 THERAPEUTIC ACTIVITIES: CPT | Mod: PN

## 2023-06-14 PROCEDURE — 97110 THERAPEUTIC EXERCISES: CPT | Mod: PN

## 2023-06-26 ENCOUNTER — CLINICAL SUPPORT (OUTPATIENT)
Dept: REHABILITATION | Facility: HOSPITAL | Age: 33
End: 2023-06-26
Payer: COMMERCIAL

## 2023-06-26 DIAGNOSIS — Z74.09 DECREASED FUNCTIONAL MOBILITY AND ENDURANCE: ICD-10-CM

## 2023-06-26 DIAGNOSIS — M25.662 DECREASED RANGE OF MOTION (ROM) OF LEFT KNEE: ICD-10-CM

## 2023-06-26 DIAGNOSIS — R29.898 DECREASED STRENGTH OF LOWER EXTREMITY: Primary | ICD-10-CM

## 2023-06-26 PROCEDURE — 97110 THERAPEUTIC EXERCISES: CPT | Mod: PN

## 2023-06-26 PROCEDURE — 97112 NEUROMUSCULAR REEDUCATION: CPT | Mod: PN

## 2023-06-26 PROCEDURE — 97530 THERAPEUTIC ACTIVITIES: CPT | Mod: PN

## 2023-06-26 NOTE — PROGRESS NOTES
[facility]   Physical Therapy Treatment Note     Name: Mike Aburto  Clinic Number: [id number]    Therapy Diagnosis:   Encounter Diagnoses   Name Primary?    Decreased strength of lower extremity Yes    Decreased range of motion (ROM) of left knee     Decreased functional mobility and endurance          Physician: Danial Ribera MD    Visit Date: 6/26/2023    Physician Orders: PT Eval and Treat   Medical Diagnosis from Referral: Injury to multiple structures of left knee [S89.92XA]  Evaluation Date: 1/25/2023  Authorization Period Expiration: 1/19/24  Plan of Care Expiration: 10/6/23  Progress Note Due: 7/6/23  Visit # / Visits authorized: 29/36  FOTO: 6/3 (most recent: 6/14/23)      Date of Surgery: 1/24/23    PTA Visit #: 0/5     Time In: 3:00 PM  Time Out: 4:00 PM  Total Billable Time: 60 minutes    SUBJECTIVE     Pt reports: he is doing well today. He reports his knee has not been bothering him.  He was compliant with home exercise program.  Response to previous treatment: no notable change  Functional change: no notable change    Pain: 2/10  Location: left knee     OBJECTIVE     Objective Measures updated at progress report unless specified.     Treatment     Mike received the treatments listed below:    therapeutic exercises to develop strength, endurance, ROM, flexibility, posture, and core stabilization for 15 minutes including:  - Upright Bike - 5 minutes              - Matrix Knee Extension - 40# - 3x10              - Matrix Leg Press - 100# - 3x10  - Sled Push/Pull 120# 3 laps      manual therapy techniques:  for 0 minutes, including:              - none performed today     neuromuscular re-education activities to improve: Balance, Coordination, Kinesthetic, Sense, Proprioception, and Posture for 20 minutes. The following activities were including:              - Step Ups (with hold) - 18 inches - 3x10              - SL RDL -  3x10              - elevated single leg  bridge (shoulders on 18 inch box) - 2x10 B              - Single leg squat at 24 inch box - 3x10     therapeutic activities to improve functional performance for 25 minutes, including:              - line hops fw/bkw - 3x10 on 2 legs              - line hops side/side - 3x10 on 2 legs   - 4 inch box jumps- 1x10 - jump up, step down   - Ladder Drills: 1 foot square, 2 feet square, hop scotch, sideways, icky shuffle, single leg hops      gait training to improve functional mobility and safety for 0 minutes, including:              - none performed today      Patient Education and Home Exercises     Home Exercises Provided and Patient Education Provided     Education provided:   - importance of consistency with HEP  - role of physical therapy for this condition      Written Home Exercises Provided: Patient instructed to cont prior HEP. Exercises were reviewed and Mike was able to demonstrate them prior to the end of the session.  Mike demonstrated good  understanding of the education provided. See EMR under Patient Instructions for exercises provided during therapy sessions    ASSESSMENT   Patient tolerated today's treatment well. Strength and control with plyometrics is improving. He will benefit from continued physical therapy care.    Mike Is progressing well towards his goals.   Pt prognosis is Good.     Pt will continue to benefit from skilled outpatient physical therapy to address the deficits listed in the problem list box on initial evaluation, provide pt/family education and to maximize pt's level of independence in the home and community environment.     Pt's spiritual, cultural and educational needs considered and pt agreeable to plan of care and goals.     Anticipated barriers to physical therapy: none    Goals:  Short-Term Goals: 6 weeks   - The patient will be independent with initial home exercise program. (Met, 3/27/23)  - The patient will increase strength to at least 3+/5 to perform functional  mobility including gait and sit to stand (Met, 3/27/23)  - The patient will increase knee ROM grossly to 0-90 degrees to perform ADLs with pain < 5/10. (Met, 3/27/23)     Long-Term Goals: 12 weeks  - Pt to achieve <25% limitation as measured by the FOTO to demonstrate decreased disability.   - The patient will be independent amb with no assistive device on all surfaces for community distances. (Met, 3/27/23)  - The patient will increase strength to at least 4+/5 to perform functional mobility including gait, stairs.(Met, 5/1/23)  - The patient will increase knee ROM grossly to WFL to perform all ADLs with pain < 3/10.(Met, 5/1/23)     Final Goals: 30-36 weeks  - Patient to demonstrate 95% or better on quad dynamometry to demonstrate satisfactory quadriceps strength. (Progressing)  - Patient to demonstrate 95% or better on Samantha Hop Testing to demonstrate satisfactory strength and functional control. (Progressing)    PLAN     Continue per plan of care.     Plan of care Certification: 1/25/2023 to 10/6/23.     Outpatient Physical Therapy 2 times weekly for 12 weeks to include the following interventions: Manual Therapy, Moist Heat/ Ice, Neuromuscular Re-ed, Patient Education, Therapeutic Activities, Therapeutic Exercise, Gait Training, Electrical Stimulation PRN, and Dry needling PRN.      This patient had a ligamentous repair in his L knee. I expect he will require 6-9 months of physical therapy to safely return to his prior level of function to allow for restored strength and adequate tissue healing.    Elliott Hartley, PT

## 2023-07-10 ENCOUNTER — TELEPHONE (OUTPATIENT)
Dept: REHABILITATION | Facility: HOSPITAL | Age: 33
End: 2023-07-10

## 2023-07-10 NOTE — TELEPHONE ENCOUNTER
Called patient after missed physical therapy appointment to check in.    He reports he has been doing okay. He reports he is out of town but plans to come to his next scheduled appointment on Thursday at 2:30.    Also reminded him of next appointment with Dr. Ribera on 7/20 at 3:30.    Elliott Hartley, PT, DPT  Physical Therapist  Board-Certified Specialist in Orthopaedic and Sports Physical Therapy  7/10/2023

## 2023-07-10 NOTE — PROGRESS NOTES
[facility]   Physical Therapy Treatment Note     Name: Mike Aburto  Clinic Number: [id number]    Therapy Diagnosis:   Encounter Diagnoses   Name Primary?    Decreased strength of lower extremity Yes    Decreased range of motion (ROM) of left knee     Decreased functional mobility and endurance          Physician: Danial Ribera MD    Visit Date: 2/1/2023    Physician Orders: PT Eval and Treat   Medical Diagnosis from Referral: Injury to multiple structures of left knee [S89.92XA]  Evaluation Date: 1/25/2023  Authorization Period Expiration: 1/26/23  Plan of Care Expiration: 4/25/23  Progress Note Due: 2/25/23  Visit # / Visits authorized: 3/ 20   FOTO: Not performed    PTA Visit #: 0/5     Postoperative Plan:  ACL with meniscus repair protocol  Weight bearing:  Nonweightbearing for 6 weeks  Range of motion:  0-90 for 4  Antibiotics: 5 days of PO prophylactic antibiotics  DVT Ppx:  Aspirin daily  PT/OT: Start POD#3  Follow-up: 10-14 days with AP/Lateral of operative knee (non-weightbearing)    Time In: 8:55 AM  Time Out: 10:05 AM  Total Billable Time: 70 minutes    SUBJECTIVE     Pt reports: he is still having some knee pain but feels like he is doing a little bit better overall.  He was compliant with home exercise program.  Response to previous treatment: no notable change  Functional change: no notable change    Pain: 8/10  Location: left knee     OBJECTIVE     Objective Measures updated at progress report unless specified.     1/27:   Edema:  3 cm superior to patella: 51 cm  Mid patella: 48 cm  3 cm inferior to patella: 43.5 cm    Extension: 0 degrees  Flexion: 85 degrees    Treatment     Mike received the treatments listed below:      therapeutic exercises to develop strength, endurance, ROM, flexibility, posture, and core stabilization for 23 minutes including:  - Heel Prop - 4# - 5 minutes              - L gastroc stretch with strap - 2 minutes              - L hamstring  stretch seated - 2 minutes   - seated edge of bed slow lowering to improve knee flexion range of motion within protocol   - prone hip extension L - 3x10   - Heel slides to 90 degrees - 10x10s     manual therapy techniques:  for 8 minutes, including:              - knee PROM to improve flexion and extension range of motion   - patella mobilizations     neuromuscular re-education activities to improve: Balance, Coordination, Kinesthetic, Sense, Proprioception, and Posture for 39 minutes. The following activities were included:   - Quad set with Senegalese - 5 minutes   - SAQ with Senegalese - 5 minutes   - LAQ with Senegalese - 5 minutes   - SLR with Seneg[place]e - 5 minutes   - Prone Quad Sets - 5 minutes   - SL Hip Abduction L - 3x10      therapeutic activities to improve functional performance for 0  minutes, including:              - none performed today     gait training to improve functional mobility and safety for 0  minutes, including:              - none performed today     supervised modalities after being cleared for contradictions: [place]e Electrical Stimulation: Mike received Russian Electrical Stimulation for pain control applied to the knee. Mike tolderated treatment well without any adverse effects.        Patient Education and Home Exercises     Home Exercises Provided and Patient Education Provided     Education provided:   - importance of consistency with HEP  - role of physical therapy for this condition  - reeducation on post-op precautions    Written Home Exercises Provided: Patient instructed to cont prior HEP. Exercises were reviewed and Mike was able to demonstrate them prior to the end of the session.  Mike demonstrated good  understanding of the education provided. See EMR under Patient Instructions for exercises provided during therapy sessions    ASSESSMENT     Mike's quad development continues to show signs of good progress. Knee extension range of motion full. He is progressing appropriately  given his post-op status.    Mike Is progressing well towards his goals.   Pt prognosis is Good.     Pt will continue to benefit from skilled outpatient physical therapy to address the deficits listed in the problem list box on initial evaluation, provide pt/family education and to maximize pt's level of independence in the home and community environment.     Pt's spiritual, cultural and educational needs considered and pt agreeable to plan of care and goals.     Anticipated barriers to physical therapy: none    Goals:  Short-Term Goals: 6 weeks  - The patient will be independent with initial home exercise program. (Progressing)  - The patient will increase strength to at least 3+/5 to perform functional mobility including gait and sit to stand (Progressing)  - The patient will increase knee ROM grossly to 0-90 degrees to perform ADLs with pain < 5/10. (Progressing)     Long-Term Goals: 12 weeks  - Pt to achieve <25% limitation as measured by the FOTO to demonstrate decreased disability.   - The patient will be independent amb with no assistive device on all surfaces for community distances. (Progressing)   - The patient will increase strength to at least 4+/5 to perform functional mobility including gait, stairs.(Progressing)  - The patient will increase knee ROM grossly to WFL to perform all ADLs with pain < 3/10.(Progressing)    PLAN     Plan of care Certification: 1/25/2023 to 4/25/23.     Outpatient Physical Therapy 2 times weekly for 12 weeks to include the following interventions: Manual Therapy, Moist Heat/ Ice, Neuromuscular Re-ed, Patient Education, Therapeutic Activities, Therapeutic Exercise, Gait Training, Electrical Stimulation PRN, and Dry needling PRN.     This patient had a ligamentous repair in his L knee. I expect he will require 6-9 months of physical therapy to safely return to his prior level of function to allow for restored strength and adequate tissue healing.    Elliott Hartley, PT         [General Appearance - Alert] : alert [General Appearance - In No Acute Distress] : in no acute distress [Full Pulse] : the pedal pulses are present [Edema] : there was no peripheral edema [FreeTextEntry1] : no erythema/warmth to the knee; small effusion; +transmetatarsal tenderness [Oriented To Time, Place, And Person] : oriented to person, place, and time [Impaired Insight] : insight and judgment were intact [Affect] : the affect was normal

## 2023-07-20 ENCOUNTER — OFFICE VISIT (OUTPATIENT)
Dept: SPORTS MEDICINE | Facility: CLINIC | Age: 33
End: 2023-07-20
Payer: COMMERCIAL

## 2023-07-20 VITALS — HEIGHT: 66 IN | BODY MASS INDEX: 37.14 KG/M2 | WEIGHT: 231.06 LBS

## 2023-07-20 DIAGNOSIS — M25.462 EFFUSION OF LEFT KNEE: ICD-10-CM

## 2023-07-20 DIAGNOSIS — M17.32 POST-TRAUMATIC OSTEOARTHRITIS OF LEFT KNEE: ICD-10-CM

## 2023-07-20 DIAGNOSIS — S83.512S RUPTURE OF ANTERIOR CRUCIATE LIGAMENT OF LEFT KNEE, SEQUELA: Primary | ICD-10-CM

## 2023-07-20 DIAGNOSIS — S83.242D ACUTE MEDIAL MENISCUS TEAR OF LEFT KNEE, SUBSEQUENT ENCOUNTER: ICD-10-CM

## 2023-07-20 PROCEDURE — 99214 OFFICE O/P EST MOD 30 MIN: CPT | Mod: 25,S$GLB,, | Performed by: ORTHOPAEDIC SURGERY

## 2023-07-20 PROCEDURE — 20610 LARGE JOINT ASPIRATION/INJECTION: R KNEE: ICD-10-PCS | Mod: LT,S$GLB,, | Performed by: ORTHOPAEDIC SURGERY

## 2023-07-20 PROCEDURE — 20610 DRAIN/INJ JOINT/BURSA W/O US: CPT | Mod: LT,S$GLB,, | Performed by: ORTHOPAEDIC SURGERY

## 2023-07-20 PROCEDURE — 1159F MED LIST DOCD IN RCRD: CPT | Mod: CPTII,S$GLB,, | Performed by: ORTHOPAEDIC SURGERY

## 2023-07-20 PROCEDURE — 3008F BODY MASS INDEX DOCD: CPT | Mod: CPTII,S$GLB,, | Performed by: ORTHOPAEDIC SURGERY

## 2023-07-20 PROCEDURE — 1159F PR MEDICATION LIST DOCUMENTED IN MEDICAL RECORD: ICD-10-PCS | Mod: CPTII,S$GLB,, | Performed by: ORTHOPAEDIC SURGERY

## 2023-07-20 PROCEDURE — 99999 PR PBB SHADOW E&M-EST. PATIENT-LVL IV: CPT | Mod: PBBFAC,,, | Performed by: ORTHOPAEDIC SURGERY

## 2023-07-20 PROCEDURE — 99214 PR OFFICE/OUTPT VISIT, EST, LEVL IV, 30-39 MIN: ICD-10-PCS | Mod: 25,S$GLB,, | Performed by: ORTHOPAEDIC SURGERY

## 2023-07-20 PROCEDURE — 99999 PR PBB SHADOW E&M-EST. PATIENT-LVL IV: ICD-10-PCS | Mod: PBBFAC,,, | Performed by: ORTHOPAEDIC SURGERY

## 2023-07-20 PROCEDURE — 3008F PR BODY MASS INDEX (BMI) DOCUMENTED: ICD-10-PCS | Mod: CPTII,S$GLB,, | Performed by: ORTHOPAEDIC SURGERY

## 2023-07-20 RX ADMIN — METHYLPREDNISOLONE ACETATE 80 MG: 80 INJECTION, SUSPENSION INTRA-ARTICULAR; INTRALESIONAL; INTRAMUSCULAR; SOFT TISSUE at 03:07

## 2023-07-20 NOTE — PROGRESS NOTES
Patient ID: Mike Aburto  YOB: 1990  MRN: [id number]    Chief Complaint: Pain and Post-op Evaluation of the Left Knee      Referred By: Established patient    History of Present Illness: Mike Aburto is a  33 y.o. male    with a chief complaint of Pain and Post-op Evaluation of the Left Knee    Mike is here today for 6months s/p L Knee ACL recon w/ quad auto, MMR, LMR, bone spur excision on 1/24/23. He states that he went to his doctor through his company to get released to go back to work last week. He stopped doing PT with Elliott last week. He rates his pain as a 5/10. He states that he is still having pain in the morning. He feels that his knee doesn't feel the same. He is not having any swelling.     HPI (4/20/23)  Mike Aburto presents today 12 weeks status post  L Knee ACL recon w/ quad auto, MMR, LMR, bone spur excision on 1/24/23. She presents FWB without brace/assistive devices. The patient has started physical therapy at [facility] with Elliott. Overall there are no issues or concerns.     Past Medical History:   Past Medical History:   Diagnosis Date    Allergy     Seasonal allergies      Past Surgical History:   Procedure Laterality Date    ARTHROSCOPY,KNEE,WITH MENISCUS REPAIR Left 1/24/2023    Procedure: ARTHROSCOPY,KNEE,WITH MENISCUS REPAIR;  Surgeon: Danial Ribera MD;  Location: [facility] OR;  Service: Orthopedics;  Laterality: Left;  medial and lateral repair    FRACTURE SURGERY      left lower arm    HAND SURGERY Right 2011    RECONSTRUCTION OF ANTERIOR CRUCIATE LIGAMENT USING GRAFT Left 1/24/2023    Procedure: RECONSTRUCTION, KNEE, ACL, USING GRAFT;  Surgeon: Danial Ribera MD;  Location: [facility];  Service: Orthopedics;  Laterality: Left;  Left knee arthroscopy, anterior cruciate ligament reconstruction with quadriceps tendon autograft    TONSILLECTOMY       Family History   Problem Relation Age of Onset    No Known Problems Mother     No Known Problems Father      Cancer Maternal Uncle     Cancer Maternal Grandfather     Diabetes Paternal Grandmother      Social History     Socioeconomic History    Marital status: Single   Tobacco Use    Smoking status: Every Day     Current packs/day: 0.50     Types: Cigarettes    Smokeless tobacco: Never   Substance and Sexual Activity    Alcohol use: Yes     Comment: occassionally    Drug use: Never    Sexual activity: Yes     Partners: Female     Medication List with Changes/Refills   Current Medications    ASPIRIN (ECOTRIN) 325 MG EC TABLET    Take 1 tablet (325 mg total) by mouth once daily. Complete full 6 weeks    DEXTROAMPHETAMINE-AMPHETAMINE 10 MG TAB    Take 1 tablet (10 mg total) by mouth once daily.    DOCUSATE SODIUM (COLACE) 100 MG CAPSULE    Take 1 capsule (100 mg total) by mouth 2 (two) times daily.    ERGOCALCIFEROL (ERGOCALCIFEROL) 50,000 UNIT CAP    Take by mouth.    GABAPENTIN (NEURONTIN) 600 MG TABLET    Take 1 tablet (600 mg total) by mouth 3 (three) times daily.    ONDANSETRON (ZOFRAN) 4 MG TABLET    Take 1 tablet (4 mg total) by mouth every 8 (eight) hours as needed for Nausea.    OXYCODONE-ACETAMINOPHEN (PERCOCET) 5-325 MG PER TABLET    Take 1 tablet by mouth every 4 to 6 hours as needed for Pain.    PHENTERMINE (ADIPEX-P) 37.5 MG TABLET    Take 37.5 mg by mouth.     Review of patient's allergies indicates:  No Known Allergies  ROS    Physical Exam:   Body mass index is 37.29 kg/m².  There were no vitals filed for this visit.   GENERAL: Well appearing, appropriate for stated age, no acute distress.  CARDIOVASCULAR: Pulses regular by peripheral palpation.  PULMONARY: Respirations are even and non-labored.  NEURO: Awake, alert, and oriented x 3.  PSYCH: Mood & affect are appropriate.  HEENT: Head is normocephalic and atraumatic.              Left Knee Exam     Inspection   Effusion: present    Tenderness   The patient tender to palpation of the medial joint line.    Range of Motion   Extension:  0   Flexion:  120      Tests   Stability   Lachman: normal (-1 to 2mm)   MCL - Valgus: normal (0 to 2mm)  LCL - Varus: normal (0 to 2mm)    Other   Sensation: normal    Comments:  Reciprocal heel to toe gate   1A Lachman  Mild effusion  Full range of motion  5/5 knee strength   Intact EHL, FHL, gastrocsoleus, and tibialis anterior. Sensation intact to light touch in superficial peroneal, deep peroneal, tibial, sural, and saphenous nerve distributions. Foot warm and well perfused with capillary refill of less than 2 seconds and palpable pedal pulses.      Muscle Strength   Left Lower Extremity   Hip Abduction: 5/5   Quadriceps:  5/5   Hamstrin/5     Vascular Exam       Left Pulses  Dorsalis Pedis:      2+  Posterior Tibial:      2+          Imaging:    X-Ray Knee 1 or 2 View Left  Narrative: EXAMINATION:  Left knee two views    CLINICAL HISTORY:  Knee pain post ACL construction    COMPARISON:  None    FINDINGS:  Bones are intact without fracture or dislocation.  Surgical changes consistent with ACL repair.  Joint fluid suspected..  Impression: Positive joint effusion and surgical changes consistent with ACL repair.    Electronically signed by: Alfred Panda MD  Date:    2023  Time:    13:50      Relevant imaging results reviewed and interpreted by me, discussed with the patient and / or family today.     Other Tests:         Patient Instructions   Assessment:  Mike Aburto is a  32 y.o. male    with a chief complaint of Pain and Post-op Evaluation of the Left Knee  6 months status post L Knee ACL recon w/ quad auto, MMR, LMR, bone spur excision on 23  Post-op  Post traumatic osteoarthritis     Encounter Diagnoses   Name Primary?    Rupture of anterior cruciate ligament of left knee, sequela Yes    Post-traumatic osteoarthritis of left knee     Effusion of left knee     Acute medial meniscus tear of left knee, subsequent encounter         Plan:  Recommend continuing physical therapy with Elliott at [facility],  full recovery from this surgery can take up to 1 year Will reach out to work to allow for continuing therapy.   Right knee CSI 2/2/40 provided today in clinic     Follow-up: 3 months or sooner if there are any problems between now and then.    Leave Review:   Google: Leave Google Review  Healthgrades: Leave Healthgrades Review    After Hours Number: [phone]      Provider Note/Medical Decision Making:       I discussed worrisome and red flag signs and symptoms with the patient. The patient expressed understanding and agreed to alert me immediately or to go to the emergency room if they experience any of these.   Treatment plan was developed with input from the patient/family, and they expressed understanding and agreement with the plan. All questions were answered today.          Danial Ribera MD  Orthopaedic Surgery & Sports Medicine       Disclaimer: This note was prepared using a voice recognition system and is likely to have sound alike errors within the text.

## 2023-07-20 NOTE — PROCEDURES
Large Joint Aspiration/Injection: R knee    Date/Time: 7/20/2023 3:30 PM  Performed by: Danial Ribera MD  Authorized by: Danial Ribera MD     Consent Done?:  Yes (Verbal)  Indications:  Joint swelling and pain  Site marked: the procedure site was marked    Timeout: prior to procedure the correct patient, procedure, and site was verified    Prep: patient was prepped and draped in usual sterile fashion      Local anesthesia used?: Yes    Anesthesia:  Local infiltration  Local anesthetic:  Bupivacaine 0.5% without epinephrine, lidocaine 1% without epinephrine, topical anesthetic and lidocaine spray    Details:  Needle Size:  22 G  Approach:  Superior  Location:  Knee  Site:  R knee  Medications:  80 mg methylPREDNISolone acetate 80 mg/mL  Patient tolerance:  Patient tolerated the procedure well with no immediate complications     2cc 1% lidocaine plain, 2cc 0.5% marcaine plain, 0.5cc 80mg methylprednisolone    Procedure Note:  We discussed the risk and benefits of injections, including pain, infection, bleeding, damage to adjacent structures, risk of reaction to injection. We discussed the steroid/cortisone injections will not heal the problem but mat help decrease inflammation and help with symptoms. We discussed the risk of repeated injections. The patient expressed understanding and wanted to proceed with the injection. We performed a timeout to verify the proper patient, proper procedure, and the proper site. The injection site was prepared in a sterile fashion. The patient tolerated it well and there were no complication. We did discuss with the patient that steroid injections can cause some increase in blood sugar and blood pressure for up to a week after the injection.

## 2023-07-20 NOTE — PROGRESS NOTES
Patient ID: Mike Aburto  YOB: 1990  MRN: [id number]    Chief Complaint: Pain and Post-op Evaluation of the Left Knee      Referred By: Established patient    History of Present Illness: Mike Aburto is a  32 y.o. male    with a chief complaint of Pain and Post-op Evaluation of the Left Knee    Mike is here today for 6months s/p L Knee ACL recon w/ quad auto, MMR, LMR, bone spur excision on 1/24/23. He states that he went to his doctor through his company to get released to go back to work last week. He stopped doing PT with Elliott last week. He rates his pain as a 5/10. He states that he is still having pain in the morning. He feels that his knee doesn't feel the same. He is not having any swelling.     HPI (4/20/23)  Mike Aburto presents today 12 weeks status post  L Knee ACL recon w/ quad auto, MMR, LMR, bone spur excision on 1/24/23. She presents FWB without brace/assistive devices. The patient has started physical therapy at [facility] with Elliott. Overall there are no issues or concerns.     Past Medical History:   Past Medical History:   Diagnosis Date    Allergy     Seasonal allergies      Past Surgical History:   Procedure Laterality Date    ARTHROSCOPY,KNEE,WITH MENISCUS REPAIR Left 1/24/2023    Procedure: ARTHROSCOPY,KNEE,WITH MENISCUS REPAIR;  Surgeon: Danial Ribera MD;  Location: [facility] OR;  Service: Orthopedics;  Laterality: Left;  medial and lateral repair    FRACTURE SURGERY      left lower arm    HAND SURGERY Right 2011    RECONSTRUCTION OF ANTERIOR CRUCIATE LIGAMENT USING GRAFT Left 1/24/2023    Procedure: RECONSTRUCTION, KNEE, ACL, USING GRAFT;  Surgeon: Danial Ribera MD;  Location: [facility];  Service: Orthopedics;  Laterality: Left;  Left knee arthroscopy, anterior cruciate ligament reconstruction with quadriceps tendon autograft    TONSILLECTOMY       Family History   Problem Relation Age of Onset    No Known Problems Mother     No Known Problems Father      Cancer Maternal Uncle     Cancer Maternal Grandfather     Diabetes Paternal Grandmother      Social History     Socioeconomic History    Marital status: Single   Tobacco Use    Smoking status: Every Day     Packs/day: 0.50     Types: Cigarettes    Smokeless tobacco: Never   Substance and Sexual Activity    Alcohol use: Yes     Comment: occassionally    Drug use: Never    Sexual activity: Yes     Partners: Female     Medication List with Changes/Refills   Current Medications    ASPIRIN (ECOTRIN) 325 MG EC TABLET    Take 1 tablet (325 mg total) by mouth once daily. Complete full 6 weeks    DEXTROAMPHETAMINE-AMPHETAMINE 10 MG TAB    Take 1 tablet (10 mg total) by mouth once daily.    DOCUSATE SODIUM (COLACE) 100 MG CAPSULE    Take 1 capsule (100 mg total) by mouth 2 (two) times daily.    ERGOCALCIFEROL (ERGOCALCIFEROL) 50,000 UNIT CAP    Take by mouth.    GABAPENTIN (NEURONTIN) 600 MG TABLET    Take 1 tablet (600 mg total) by mouth 3 (three) times daily.    ONDANSETRON (ZOFRAN) 4 MG TABLET    Take 1 tablet (4 mg total) by mouth every 8 (eight) hours as needed for Nausea.    OXYCODONE-ACETAMINOPHEN (PERCOCET) 5-325 MG PER TABLET    Take 1 tablet by mouth every 4 to 6 hours as needed for Pain.    PHENTERMINE (ADIPEX-P) 37.5 MG TABLET    Take 37.5 mg by mouth.     Review of patient's allergies indicates:  No Known Allergies  ROS    Physical Exam:   Body mass index is 37.29 kg/m².  There were no vitals filed for this visit.   GENERAL: Well appearing, appropriate for stated age, no acute distress.  CARDIOVASCULAR: Pulses regular by peripheral palpation.  PULMONARY: Respirations are even and non-labored.  NEURO: Awake, alert, and oriented x 3.  PSYCH: Mood & affect are appropriate.  HEENT: Head is normocephalic and atraumatic.  Ortho/SPM Exam  ***    Imaging:    X-Ray Knee 1 or 2 View Left  Narrative: EXAMINATION:  Left knee two views    CLINICAL HISTORY:  Knee pain post ACL  construction    COMPARISON:  None    FINDINGS:  Bones are intact without fracture or dislocation.  Surgical changes consistent with ACL repair.  Joint fluid suspected..  Impression: Positive joint effusion and surgical changes consistent with ACL repair.    Electronically signed by: Alfred Panda MD  Date:    02/06/2023  Time:    13:50    ***  Relevant imaging results reviewed and interpreted by me, discussed with the patient and / or family today. ***    Other Tests:     ***    There are no Patient Instructions on file for this visit.  Provider Note/Medical Decision Making: ***      I discussed worrisome and red flag signs and symptoms with the patient. The patient expressed understanding and agreed to alert me immediately or to go to the emergency room if they experience any of these.   Treatment plan was developed with input from the patient/family, and they expressed understanding and agreement with the plan. All questions were answered today.          Danial Ribera MD  Orthopaedic Surgery & Sports Medicine       Disclaimer: This note was prepared using a voice recognition system and is likely to have sound alike errors within the text.

## 2023-07-20 NOTE — PATIENT INSTRUCTIONS
Assessment:  Mike Aburto is a  32 y.o. male    with a chief complaint of Pain and Post-op Evaluation of the Left Knee  6 months status post L Knee ACL recon w/ quad auto, MMR, LMR, bone spur excision on 1/24/23  Post-op  Post traumatic osteoarthritis     Encounter Diagnoses   Name Primary?    Rupture of anterior cruciate ligament of left knee, sequela Yes    Post-traumatic osteoarthritis of left knee     Effusion of left knee     Acute medial meniscus tear of left knee, subsequent encounter         Plan:  Recommend continuing physical therapy with Elliott at [facility], full recovery from this surgery can take up to 1 year Will reach out to work to allow for continuing therapy.   Right knee CSI 2/2/40 provided today in clinic     Follow-up: 3 months or sooner if there are any problems between now and then.    Leave Review:   Google: Leave Google Review  Healthgrades: Leave Healthgrades Review    After Hours Number: [phone]

## 2023-08-15 RX ORDER — METHYLPREDNISOLONE ACETATE 80 MG/ML
80 INJECTION, SUSPENSION INTRA-ARTICULAR; INTRALESIONAL; INTRAMUSCULAR; SOFT TISSUE
Status: DISCONTINUED | OUTPATIENT
Start: 2023-07-20 | End: 2023-08-15 | Stop reason: HOSPADM

## 2023-10-19 ENCOUNTER — OFFICE VISIT (OUTPATIENT)
Dept: SPORTS MEDICINE | Facility: CLINIC | Age: 33
End: 2023-10-19
Payer: COMMERCIAL

## 2023-10-19 VITALS — BODY MASS INDEX: 37.12 KG/M2 | HEIGHT: 66 IN | WEIGHT: 231 LBS

## 2023-10-19 DIAGNOSIS — S83.282A TEAR OF LATERAL MENISCUS OF LEFT KNEE, CURRENT, UNSPECIFIED TEAR TYPE, INITIAL ENCOUNTER: ICD-10-CM

## 2023-10-19 DIAGNOSIS — S83.512S RUPTURE OF ANTERIOR CRUCIATE LIGAMENT OF LEFT KNEE, SEQUELA: Primary | ICD-10-CM

## 2023-10-19 DIAGNOSIS — S83.242S TEAR OF MEDIAL MENISCUS OF LEFT KNEE, CURRENT, UNSPECIFIED TEAR TYPE, SEQUELA: ICD-10-CM

## 2023-10-19 DIAGNOSIS — M17.32 POST-TRAUMATIC OSTEOARTHRITIS OF LEFT KNEE: ICD-10-CM

## 2023-10-19 PROCEDURE — 1159F PR MEDICATION LIST DOCUMENTED IN MEDICAL RECORD: ICD-10-PCS | Mod: CPTII,S$GLB,, | Performed by: ORTHOPAEDIC SURGERY

## 2023-10-19 PROCEDURE — 99999 PR PBB SHADOW E&M-EST. PATIENT-LVL III: CPT | Mod: PBBFAC,,, | Performed by: ORTHOPAEDIC SURGERY

## 2023-10-19 PROCEDURE — 99213 PR OFFICE/OUTPT VISIT, EST, LEVL III, 20-29 MIN: ICD-10-PCS | Mod: S$GLB,,, | Performed by: ORTHOPAEDIC SURGERY

## 2023-10-19 PROCEDURE — 99999 PR PBB SHADOW E&M-EST. PATIENT-LVL III: ICD-10-PCS | Mod: PBBFAC,,, | Performed by: ORTHOPAEDIC SURGERY

## 2023-10-19 PROCEDURE — 99213 OFFICE O/P EST LOW 20 MIN: CPT | Mod: S$GLB,,, | Performed by: ORTHOPAEDIC SURGERY

## 2023-10-19 PROCEDURE — 3008F PR BODY MASS INDEX (BMI) DOCUMENTED: ICD-10-PCS | Mod: CPTII,S$GLB,, | Performed by: ORTHOPAEDIC SURGERY

## 2023-10-19 PROCEDURE — 3008F BODY MASS INDEX DOCD: CPT | Mod: CPTII,S$GLB,, | Performed by: ORTHOPAEDIC SURGERY

## 2023-10-19 PROCEDURE — 1159F MED LIST DOCD IN RCRD: CPT | Mod: CPTII,S$GLB,, | Performed by: ORTHOPAEDIC SURGERY

## 2023-10-19 RX ORDER — HYDROCODONE BITARTRATE AND ACETAMINOPHEN 10; 325 MG/1; MG/1
1 TABLET ORAL
COMMUNITY
Start: 2023-10-16

## 2023-10-19 RX ORDER — DICLOFENAC SODIUM 10 MG/G
GEL TOPICAL
COMMUNITY
Start: 2023-10-16

## 2023-10-19 RX ORDER — NABUMETONE 750 MG/1
750 TABLET, FILM COATED ORAL 2 TIMES DAILY
COMMUNITY
Start: 2023-10-16

## 2023-10-19 NOTE — PATIENT INSTRUCTIONS
Assessment:  Mike Aburto is a  33 y.o. male    with a chief complaint of Pain of the Left Knee  9 months status post L Knee ACL recon w/ quad auto, MMR, LMR, bone spur excision on 1/24/23  Post-op  Post traumatic osteoarthritis     Encounter Diagnoses   Name Primary?    Rupture of anterior cruciate ligament of left knee, sequela Yes    Post-traumatic osteoarthritis of left knee     Tear of medial meniscus of left knee, current, unspecified tear type, sequela     Tear of lateral meniscus of left knee, current, unspecified tear type, initial encounter           Plan:  Will schedule patient for strength testing to see   Recommend restarting physical therapy  Follow up at 1 year amadeo with repeat knee xrays    Follow-up: 3 months or sooner if there are any problems between now and then.    Leave Review:   Google: Leave Google Review  Healthgrades: Leave Healthgrades Review    After Hours Number: [phone]

## 2023-10-19 NOTE — PROGRESS NOTES
Patient ID: Mike Aburto  YOB: 1990  MRN: [id number]    Chief Complaint: Pain of the Left Knee      Referred By: EP    History of Present Illness: Mike Aburto is a *** 33 y.o. male    with a chief complaint of Pain of the Left Knee    Mike presents to the clinic today for a follow up on his Left knee. He is 9 months out from a L Knee ACL recon w/ quad auto, MMR, LMR, bone spur excision on 1/24/23.  He states he does not feel great. He states he cannot run. He states his pain is a 6 out of 10. He states the injection helped for about 1 week. He describes popping pain still. He sees pain mgmt (but is unsure of who his doctor is). The medications he gets from pain mgmt helps his pain.     Previous Visit: 07/20/23  Mike is here today for 6months s/p L Knee ACL recon w/ quad auto, MMR, LMR, bone spur excision on 1/24/23. He states that he went to his doctor through his company to get released to go back to work last week. He stopped doing PT with [facility] last week. He rates his pain as a 5/10. He states that he is still having pain in the morning. He feels that his knee doesn't feel the same. He is not having any swelling.     HPI    Past Medical History:   Past Medical History:   Diagnosis Date    Allergy     Seasonal allergies      Past Surgical History:   Procedure Laterality Date    ARTHROSCOPY,KNEE,WITH MENISCUS REPAIR Left 1/24/2023    Procedure: ARTHROSCOPY,KNEE,WITH MENISCUS REPAIR;  Surgeon: Danial Ribera MD;  Location: [facility] OR;  Service: Orthopedics;  Laterality: Left;  medial and lateral repair    FRACTURE SURGERY      left lower arm    HAND SURGERY Right 2011    RECONSTRUCTION OF ANTERIOR CRUCIATE LIGAMENT USING GRAFT Left 1/24/2023    Procedure: RECONSTRUCTION, KNEE, ACL, USING GRAFT;  Surgeon: Danial Ribera MD;  Location: [facility] OR;  Service: Orthopedics;  Laterality: Left;  Left knee arthroscopy, anterior cruciate ligament reconstruction with quadriceps tendon  autograft    TONSILLECTOMY       Family History   Problem Relation Age of Onset    No Known Problems Mother     No Known Problems Father     Cancer Maternal Uncle     Cancer Maternal Grandfather     Diabetes Paternal Grandmother      Social History     Socioeconomic History    Marital status: Single   Tobacco Use    Smoking status: Every Day     Current packs/day: 0.50     Types: Cigarettes    Smokeless tobacco: Never   Substance and Sexual Activity    Alcohol use: Yes     Comment: occassionally    Drug use: Never    Sexual activity: Yes     Partners: Female     Medication List with Changes/Refills   Current Medications    ASPIRIN (ECOTRIN) 325 MG EC TABLET    Take 1 tablet (325 mg total) by mouth once daily. Complete full 6 weeks    DEXTROAMPHETAMINE-AMPHETAMINE 10 MG TAB    Take 1 tablet (10 mg total) by mouth once daily.    DICLOFENAC SODIUM (VOLTAREN) 1 % GEL    SMARTSI Topical Twice Daily    DOCUSATE SODIUM (COLACE) 100 MG CAPSULE    Take 1 capsule (100 mg total) by mouth 2 (two) times daily.    ERGOCALCIFEROL (ERGOCALCIFEROL) 50,000 UNIT CAP    Take by mouth.    GABAPENTIN (NEURONTIN) 600 MG TABLET    Take 1 tablet (600 mg total) by mouth 3 (three) times daily.    HYDROCODONE-ACETAMINOPHEN (NORCO)  MG PER TABLET    Take 1 tablet by mouth.    NABUMETONE (RELAFEN) 750 MG TABLET    Take 750 mg by mouth 2 (two) times daily.    ONDANSETRON (ZOFRAN) 4 MG TABLET    Take 1 tablet (4 mg total) by mouth every 8 (eight) hours as needed for Nausea.    OXYCODONE-ACETAMINOPHEN (PERCOCET) 5-325 MG PER TABLET    Take 1 tablet by mouth every 4 to 6 hours as needed for Pain.    PHENTERMINE (ADIPEX-P) 37.5 MG TABLET    Take 37.5 mg by mouth.     Review of patient's allergies indicates:  No Known Allergies  ROS    Physical Exam:   Body mass index is 37.28 kg/m².  There were no vitals filed for this visit.   GENERAL: Well appearing, appropriate for stated age, no acute distress.  CARDIOVASCULAR: Pulses regular by  peripheral palpation.  PULMONARY: Respirations are even and non-labored.  NEURO: Awake, alert, and oriented x 3.  PSYCH: Mood & affect are appropriate.  HEENT: Head is normocephalic and atraumatic.  Ortho/SPM Exam  ***    Imaging:    X-Ray Knee 1 or 2 View Left  Narrative: EXAMINATION:  Left knee two views    CLINICAL HISTORY:  Knee pain post ACL construction    COMPARISON:  None    FINDINGS:  Bones are intact without fracture or dislocation.  Surgical changes consistent with ACL repair.  Joint fluid suspected..  Impression: Positive joint effusion and surgical changes consistent with ACL repair.    Electronically signed by: Alfred Panda MD  Date:    02/06/2023  Time:    13:50    ***  Relevant imaging results reviewed and interpreted by me, discussed with the patient and / or family today. ***    Other Tests:     ***    There are no Patient Instructions on file for this visit.  Provider Note/Medical Decision Making: ***      I discussed worrisome and red flag signs and symptoms with the patient. The patient expressed understanding and agreed to alert me immediately or to go to the emergency room if they experience any of these.   Treatment plan was developed with input from the patient/family, and they expressed understanding and agreement with the plan. All questions were answered today.          Danial Ribera MD  Orthopaedic Surgery & Sports Medicine       Disclaimer: This note was prepared using a voice recognition system and is likely to have sound alike errors within the text.

## 2023-10-19 NOTE — PROGRESS NOTES
Patient ID: Mike Aburto  YOB: 1990  MRN: [id number]    Chief Complaint: Pain of the Left Knee      Referred By: Ilda Bonilla    History of Present Illness: Mike Aburto is a  33 y.o. male    with a chief complaint of Pain of the Left Knee    Mike presents to the clinic today for a follow up on his Left knee. He is 9 months out from a L Knee ACL recon w/ quad auto, MMR, LMR, bone spur excision on 1/24/23.  He states he does not feel great. He states he cannot run. He states his pain is a 6 out of 10. He states the injection helped for about 1 week. He describes popping pain still. He sees pain mgmt (but is unsure of who his doctor is). The medications he gets from pain mgmt helps his pain.     HPI    Past Medical History:   Past Medical History:   Diagnosis Date    Allergy     Seasonal allergies      Past Surgical History:   Procedure Laterality Date    ARTHROSCOPY,KNEE,WITH MENISCUS REPAIR Left 1/24/2023    Procedure: ARTHROSCOPY,KNEE,WITH MENISCUS REPAIR;  Surgeon: Danial Ribera MD;  Location: [facility];  Service: Orthopedics;  Laterality: Left;  medial and lateral repair    FRACTURE SURGERY      left lower arm    HAND SURGERY Right 2011    RECONSTRUCTION OF ANTERIOR CRUCIATE LIGAMENT USING GRAFT Left 1/24/2023    Procedure: RECONSTRUCTION, KNEE, ACL, USING GRAFT;  Surgeon: Danial Ribera MD;  Location: [facility];  Service: Orthopedics;  Laterality: Left;  Left knee arthroscopy, anterior cruciate ligament reconstruction with quadriceps tendon autograft    TONSILLECTOMY       Family History   Problem Relation Age of Onset    No Known Problems Mother     No Known Problems Father     Cancer Maternal Uncle     Cancer Maternal Grandfather     Diabetes Paternal Grandmother      Social History     Socioeconomic History    Marital status: Single   Tobacco Use    Smoking status: Every Day     Current packs/day: 0.50     Types: Cigarettes    Smokeless tobacco: Never   Substance  and Sexual Activity    Alcohol use: Yes     Comment: occassionally    Drug use: Never    Sexual activity: Yes     Partners: Female     Medication List with Changes/Refills   Current Medications    ASPIRIN (ECOTRIN) 325 MG EC TABLET    Take 1 tablet (325 mg total) by mouth once daily. Complete full 6 weeks    DEXTROAMPHETAMINE-AMPHETAMINE 10 MG TAB    Take 1 tablet (10 mg total) by mouth once daily.    DICLOFENAC SODIUM (VOLTAREN) 1 % GEL    SMARTSI Topical Twice Daily    DOCUSATE SODIUM (COLACE) 100 MG CAPSULE    Take 1 capsule (100 mg total) by mouth 2 (two) times daily.    ERGOCALCIFEROL (ERGOCALCIFEROL) 50,000 UNIT CAP    Take by mouth.    GABAPENTIN (NEURONTIN) 600 MG TABLET    Take 1 tablet (600 mg total) by mouth 3 (three) times daily.    HYDROCODONE-ACETAMINOPHEN (NORCO)  MG PER TABLET    Take 1 tablet by mouth.    NABUMETONE (RELAFEN) 750 MG TABLET    Take 750 mg by mouth 2 (two) times daily.    ONDANSETRON (ZOFRAN) 4 MG TABLET    Take 1 tablet (4 mg total) by mouth every 8 (eight) hours as needed for Nausea.    OXYCODONE-ACETAMINOPHEN (PERCOCET) 5-325 MG PER TABLET    Take 1 tablet by mouth every 4 to 6 hours as needed for Pain.    PHENTERMINE (ADIPEX-P) 37.5 MG TABLET    Take 37.5 mg by mouth.     Review of patient's allergies indicates:  No Known Allergies  ROS    Physical Exam:   Body mass index is 37.28 kg/m².  There were no vitals filed for this visit.   GENERAL: Well appearing, appropriate for stated age, no acute distress.  CARDIOVASCULAR: Pulses regular by peripheral palpation.  PULMONARY: Respirations are even and non-labored.  NEURO: Awake, alert, and oriented x 3.  PSYCH: Mood & affect are appropriate.  HEENT: Head is normocephalic and atraumatic.              Left Knee Exam     Inspection   Scars: present  Effusion: absent    Tenderness   The patient tender to palpation of the medial joint line.    Crepitus   The patient has crepitus of the patella.    Range of Motion   Extension:  0    Flexion:  120     Tests   Stability   Lachman: normal (-1 to 2mm)   MCL - Valgus: normal (0 to 2mm)  LCL - Varus: normal (0 to 2mm)    Other   Sensation: normal    Comments:  Intact EHL, FHL, gastrocsoleus, and tibialis anterior. Sensation intact to light touch in superficial peroneal, deep peroneal, tibial, sural, and saphenous nerve distributions. Foot warm and well perfused with capillary refill of less than 2 seconds and palpable pedal pulses.      Muscle Strength   Left Lower Extremity   Hip Abduction: 5/5   Quadriceps:  4/5   Hamstrin/5     Vascular Exam       Left Pulses  Dorsalis Pedis:      2+  Posterior Tibial:      2+          Imaging:    X-Ray Knee 1 or 2 View Left  Narrative: EXAMINATION:  Left knee two views    CLINICAL HISTORY:  Knee pain post ACL construction    COMPARISON:  None    FINDINGS:  Bones are intact without fracture or dislocation.  Surgical changes consistent with ACL repair.  Joint fluid suspected..  Impression: Positive joint effusion and surgical changes consistent with ACL repair.    Electronically signed by: Alfred Panda MD  Date:    2023  Time:    13:50      Relevant imaging results reviewed and interpreted by me, discussed with the patient and / or family today.     Other Tests:         Patient Instructions   Assessment:  Mike Aburto is a  33 y.o. male    with a chief complaint of Pain of the Left Knee  9 months status post L Knee ACL recon w/ quad auto, MMR, LMR, bone spur excision on 23  Post-op  Post traumatic osteoarthritis     Encounter Diagnoses   Name Primary?    Rupture of anterior cruciate ligament of left knee, sequela Yes    Post-traumatic osteoarthritis of left knee     Tear of medial meniscus of left knee, current, unspecified tear type, sequela     Tear of lateral meniscus of left knee, current, unspecified tear type, initial encounter           Plan:  Will schedule patient for strength testing to see   Recommend restarting  physical therapy  Follow up at 1 year amadeo with repeat knee xrays    Follow-up: 3 months or sooner if there are any problems between now and then.    Leave Review:   Google: Leave Google Review  Healthgrades: Leave Healthgrades Review    After Hours Number: [phone]      Provider Note/Medical Decision Making:       I discussed worrisome and red flag signs and symptoms with the patient. The patient expressed understanding and agreed to alert me immediately or to go to the emergency room if they experience any of these.   Treatment plan was developed with input from the patient/family, and they expressed understanding and agreement with the plan. All questions were answered today.          Danial Ribera MD  Orthopaedic Surgery & Sports Medicine       Disclaimer: This note was prepared using a voice recognition system and is likely to have sound alike errors within the text.     I, Nicki Salmeron, acted as a scribe for Danial Ribera MD for the duration of this office visit.

## 2024-01-24 DIAGNOSIS — S83.242S TEAR OF MEDIAL MENISCUS OF LEFT KNEE, CURRENT, UNSPECIFIED TEAR TYPE, SEQUELA: Primary | ICD-10-CM

## 2025-07-30 PROBLEM — F90.9 ADHD: Status: ACTIVE | Noted: 2025-07-30

## 2025-08-07 ENCOUNTER — TELEPHONE (OUTPATIENT)
Dept: SMOKING CESSATION | Facility: CLINIC | Age: 35
End: 2025-08-07
Payer: COMMERCIAL

## 2025-08-07 NOTE — TELEPHONE ENCOUNTER
Smoking Cessation contacted patient regarding no show appointment for intake visit. Patient stated he would like to reschedule appointment for 8/12/25 at 1:00 P.M. Counselor contacted  to reschedule appointment.      Rianna Boyd RRT,MSW,LMSW, [facility]  Certified Professional [employer]- Tobacco Treatment  [phone]

## (undated) DEVICE — NDL 26.5 TAPR CRV XLOOP

## (undated) DEVICE — SUPPORT ULNA NERVE PROTECTOR

## (undated) DEVICE — DRAPE MINI C-ARM

## (undated) DEVICE — APPLICATOR CHLORAPREP ORN 26ML

## (undated) DEVICE — SUT MCRYL PLUS 3-0 PS2 27IN

## (undated) DEVICE — PASSER SUTURE SCORPION 3.2MM

## (undated) DEVICE — GOWN SMARTGOWN LVL4 X-LONG XL

## (undated) DEVICE — BANDAGE ESMARK ELASTIC ST 6X9

## (undated) DEVICE — DRAPE THREE-QTR REINF 53X77IN

## (undated) DEVICE — SEE MEDLINE ITEM 157216

## (undated) DEVICE — POSITIONER HEAD DONUT 9IN FOAM

## (undated) DEVICE — UNDERGLOVES BIOGEL PI SZ 7 LF

## (undated) DEVICE — BLADE SHAVER TORPEDO 4MMX13CM

## (undated) DEVICE — COVER LIGHT HANDLE 80/CA

## (undated) DEVICE — SUT FIBERWIRE

## (undated) DEVICE — SOL IRR NACL .9% 3000ML

## (undated) DEVICE — GLOVE SURG ULTRA TOUCH 6

## (undated) DEVICE — SUT FIBERTAPE 1.3MM TAILS

## (undated) DEVICE — MAT SURGICAL ECOSUCTIONER

## (undated) DEVICE — PASSER SUTURELASSO MICROSURG

## (undated) DEVICE — ELECTRODE REM PLYHSV RETURN 9

## (undated) DEVICE — SUT VICRYL PLUS 2-0 CT1 18

## (undated) DEVICE — SUT 3-0 MONOCRYL PLUS PS-2

## (undated) DEVICE — NDL SPINAL 18GX3.5 SPINOCAN

## (undated) DEVICE — COVER PROXIMA MAYO STAND

## (undated) DEVICE — Device

## (undated) DEVICE — PAD ABD 8X10 STERILE

## (undated) DEVICE — CUTTER SUT KNOT PUSH PRTL SKID

## (undated) DEVICE — CLOSURE SKIN STERI STRIP 1/2X4

## (undated) DEVICE — SUT FIBERWIRE LOOP TIGER 2

## (undated) DEVICE — TUBING PUMP ARTHROSCOPY STRL

## (undated) DEVICE — UNDERGLOVES BIOGEL PI SZ 6 LF

## (undated) DEVICE — TOWEL OR DISP STRL BLUE 4/PK

## (undated) DEVICE — TAPE SILK 3IN

## (undated) DEVICE — COVER CAMERA OPERATING ROOM

## (undated) DEVICE — GOWN POLY REINF BRTH SLV XL

## (undated) DEVICE — SUT 2 20 FIBERLOOP STR NDL

## (undated) DEVICE — PUSHER CUTTER KNOT FAST FX STR

## (undated) DEVICE — PROBE MULTI PORT RF 90 DEGREE

## (undated) DEVICE — MANIFOLD 4 PORT

## (undated) DEVICE — SHAVER SABRETOOTH CRV 4MMX13CM

## (undated) DEVICE — TOURNIQUET SB QC DP 34X4IN

## (undated) DEVICE — SUT CTD VICRYL 0 UND BR SUT

## (undated) DEVICE — BRACE KNEE T SCOPE PREMIER

## (undated) DEVICE — TUBING SUCTION STRAIGHT .25X20

## (undated) DEVICE — DRAPE STERI INSTRUMENT 1018

## (undated) DEVICE — TAPE SURGICAL MICROFOAM 3IN

## (undated) DEVICE — SUT VICRYL PLUS 0 CT1 18IN

## (undated) DEVICE — DRAPE U SPLIT SHEET 54X76IN

## (undated) DEVICE — COVER TABLE HVY DTY 60X90IN

## (undated) DEVICE — SAWBLADE TRANS TIB ACL

## (undated) DEVICE — BANDAGE ACE DOUBLE STER 6IN

## (undated) DEVICE — INSTRUMENT FRAZIER 10FR W/VENT

## (undated) DEVICE — BLADE SURG CARBON STEEL #10

## (undated) DEVICE — GLOVE PROTEXIS LTX  8.5

## (undated) DEVICE — PACK BASIC SETUP SC BR

## (undated) DEVICE — DRAPE INCISE IOBAN 2 23X17IN

## (undated) DEVICE — GAUZE SPONGE 4X4 12PLY

## (undated) DEVICE — PAD CAST SPECIALIST STRL 6

## (undated) DEVICE — ADHESIVE MASTISOL VIAL 48/BX

## (undated) DEVICE — SUT PROLENE 3-0 PS-2 BL 18IN

## (undated) DEVICE — UNDERGLOVES BIOGEL PI SIZE 8.5

## (undated) DEVICE — DRAPE T EXTRM SURG 121X128X90

## (undated) DEVICE — BNDG COFLEX FOAM LF2 ST 4X5YD